# Patient Record
Sex: FEMALE | Race: WHITE | NOT HISPANIC OR LATINO | Employment: OTHER | URBAN - METROPOLITAN AREA
[De-identification: names, ages, dates, MRNs, and addresses within clinical notes are randomized per-mention and may not be internally consistent; named-entity substitution may affect disease eponyms.]

---

## 2017-01-09 ENCOUNTER — HOSPITAL ENCOUNTER (EMERGENCY)
Facility: HOSPITAL | Age: 32
Discharge: HOME | End: 2017-01-09
Attending: EMERGENCY MEDICINE | Admitting: EMERGENCY MEDICINE
Payer: MEDICARE

## 2017-01-09 ENCOUNTER — APPOINTMENT (EMERGENCY)
Dept: RADIOLOGY | Facility: HOSPITAL | Age: 32
End: 2017-01-09
Payer: MEDICARE

## 2017-01-09 VITALS
WEIGHT: 215 LBS | RESPIRATION RATE: 16 BRPM | HEART RATE: 71 BPM | TEMPERATURE: 98.3 F | SYSTOLIC BLOOD PRESSURE: 158 MMHG | BODY MASS INDEX: 33.67 KG/M2 | DIASTOLIC BLOOD PRESSURE: 85 MMHG | OXYGEN SATURATION: 98 %

## 2017-01-09 DIAGNOSIS — J20.9 BRONCHITIS, ACUTE: ICD-10-CM

## 2017-01-09 DIAGNOSIS — H60.92 OTITIS EXTERNA OF LEFT EAR: Primary | ICD-10-CM

## 2017-01-09 LAB
ANION GAP SERPL CALCULATED.3IONS-SCNC: 13 MMOL/L (ref 4–13)
BASOPHILS # BLD AUTO: 0 THOUSANDS/ΜL (ref 0–0.1)
BASOPHILS NFR BLD AUTO: 0 % (ref 0–1)
BUN SERPL-MCNC: 20 MG/DL (ref 5–25)
CALCIUM SERPL-MCNC: 8.9 MG/DL (ref 8.3–10.1)
CHLORIDE SERPL-SCNC: 105 MMOL/L (ref 100–108)
CO2 SERPL-SCNC: 22 MMOL/L (ref 21–32)
CREAT SERPL-MCNC: 0.81 MG/DL (ref 0.6–1.3)
EOSINOPHIL # BLD AUTO: 0.1 THOUSAND/ΜL (ref 0–0.61)
EOSINOPHIL NFR BLD AUTO: 1 % (ref 0–6)
ERYTHROCYTE [DISTWIDTH] IN BLOOD BY AUTOMATED COUNT: 14.3 % (ref 11.6–15.1)
GFR SERPL CREATININE-BSD FRML MDRD: >60 ML/MIN/1.73SQ M
GLUCOSE SERPL-MCNC: 95 MG/DL (ref 65–140)
HCT VFR BLD AUTO: 47.7 % (ref 37–47)
HGB BLD-MCNC: 15.7 G/DL (ref 12–16)
LYMPHOCYTES # BLD AUTO: 2.1 THOUSANDS/ΜL (ref 0.6–4.47)
LYMPHOCYTES NFR BLD AUTO: 20 % (ref 14–44)
MCH RBC QN AUTO: 31.1 PG (ref 27–31)
MCHC RBC AUTO-ENTMCNC: 33 G/DL (ref 31.4–37.4)
MCV RBC AUTO: 94 FL (ref 82–98)
MONOCYTES # BLD AUTO: 0.6 THOUSAND/ΜL (ref 0.17–1.22)
MONOCYTES NFR BLD AUTO: 6 % (ref 4–12)
NEUTROPHILS # BLD AUTO: 7.9 THOUSANDS/ΜL (ref 1.85–7.62)
NEUTS SEG NFR BLD AUTO: 73 % (ref 43–75)
NRBC BLD AUTO-RTO: 0 /100 WBCS
PLATELET # BLD AUTO: 271 THOUSANDS/UL (ref 130–400)
PMV BLD AUTO: 8.2 FL (ref 8.9–12.7)
POTASSIUM SERPL-SCNC: 4.3 MMOL/L (ref 3.5–5.3)
RBC # BLD AUTO: 5.06 MILLION/UL (ref 4.2–5.4)
SODIUM SERPL-SCNC: 140 MMOL/L (ref 136–145)
WBC # BLD AUTO: 10.7 THOUSAND/UL (ref 4.8–10.8)

## 2017-01-09 PROCEDURE — 85025 COMPLETE CBC W/AUTO DIFF WBC: CPT | Performed by: EMERGENCY MEDICINE

## 2017-01-09 PROCEDURE — 36415 COLL VENOUS BLD VENIPUNCTURE: CPT | Performed by: EMERGENCY MEDICINE

## 2017-01-09 PROCEDURE — 94640 AIRWAY INHALATION TREATMENT: CPT

## 2017-01-09 PROCEDURE — 99284 EMERGENCY DEPT VISIT MOD MDM: CPT

## 2017-01-09 PROCEDURE — 71010 HB CHEST X-RAY 1 VIEW FRONTAL (PORTABLE): CPT

## 2017-01-09 PROCEDURE — 96375 TX/PRO/DX INJ NEW DRUG ADDON: CPT

## 2017-01-09 PROCEDURE — 80048 BASIC METABOLIC PNL TOTAL CA: CPT | Performed by: EMERGENCY MEDICINE

## 2017-01-09 PROCEDURE — 96365 THER/PROPH/DIAG IV INF INIT: CPT

## 2017-01-09 RX ORDER — ALBUTEROL SULFATE 90 UG/1
1-2 AEROSOL, METERED RESPIRATORY (INHALATION) EVERY 6 HOURS PRN
Qty: 1 INHALER | Refills: 0 | Status: SHIPPED | OUTPATIENT
Start: 2017-01-09 | End: 2017-02-08

## 2017-01-09 RX ORDER — NEOMYCIN SULFATE, POLYMYXIN B SULFATE AND HYDROCORTISONE 10; 3.5; 1 MG/ML; MG/ML; [USP'U]/ML
3 SUSPENSION/ DROPS AURICULAR (OTIC) 3 TIMES DAILY
Qty: 10 ML | Refills: 0 | Status: SHIPPED | OUTPATIENT
Start: 2017-01-09 | End: 2017-09-17 | Stop reason: ALTCHOICE

## 2017-01-09 RX ORDER — PREDNISONE 20 MG/1
40 TABLET ORAL DAILY
Qty: 8 TABLET | Refills: 0 | Status: SHIPPED | OUTPATIENT
Start: 2017-01-09 | End: 2017-01-13

## 2017-01-09 RX ORDER — IPRATROPIUM BROMIDE AND ALBUTEROL SULFATE 2.5; .5 MG/3ML; MG/3ML
3 SOLUTION RESPIRATORY (INHALATION) ONCE
Status: COMPLETED | OUTPATIENT
Start: 2017-01-09 | End: 2017-01-09

## 2017-01-09 RX ORDER — OXYCODONE HYDROCHLORIDE AND ACETAMINOPHEN 5; 325 MG/1; MG/1
1 TABLET ORAL ONCE
Status: COMPLETED | OUTPATIENT
Start: 2017-01-09 | End: 2017-01-09

## 2017-01-09 RX ORDER — AZITHROMYCIN 250 MG/1
250 TABLET, FILM COATED ORAL DAILY
Qty: 4 TABLET | Refills: 0 | Status: SHIPPED | OUTPATIENT
Start: 2017-01-09 | End: 2017-01-13

## 2017-01-09 RX ORDER — METHYLPREDNISOLONE SODIUM SUCCINATE 125 MG/2ML
125 INJECTION, POWDER, LYOPHILIZED, FOR SOLUTION INTRAMUSCULAR; INTRAVENOUS ONCE
Status: COMPLETED | OUTPATIENT
Start: 2017-01-09 | End: 2017-01-09

## 2017-01-09 RX ADMIN — IPRATROPIUM BROMIDE AND ALBUTEROL SULFATE 3 ML: .5; 3 SOLUTION RESPIRATORY (INHALATION) at 14:34

## 2017-01-09 RX ADMIN — AZITHROMYCIN 500 MG: 500 INJECTION, POWDER, LYOPHILIZED, FOR SOLUTION INTRAVENOUS at 14:49

## 2017-01-09 RX ADMIN — SODIUM CHLORIDE 1000 ML: 0.9 INJECTION, SOLUTION INTRAVENOUS at 14:45

## 2017-01-09 RX ADMIN — METHYLPREDNISOLONE SODIUM SUCCINATE 125 MG: 125 INJECTION, POWDER, FOR SOLUTION INTRAMUSCULAR; INTRAVENOUS at 14:45

## 2017-01-09 RX ADMIN — OXYCODONE HYDROCHLORIDE AND ACETAMINOPHEN 1 TABLET: 5; 325 TABLET ORAL at 14:44

## 2017-01-27 ENCOUNTER — GENERIC CONVERSION - ENCOUNTER (OUTPATIENT)
Dept: OTHER | Facility: OTHER | Age: 32
End: 2017-01-27

## 2017-02-02 ENCOUNTER — GENERIC CONVERSION - ENCOUNTER (OUTPATIENT)
Dept: OTHER | Facility: OTHER | Age: 32
End: 2017-02-02

## 2017-02-24 ENCOUNTER — GENERIC CONVERSION - ENCOUNTER (OUTPATIENT)
Dept: OTHER | Facility: OTHER | Age: 32
End: 2017-02-24

## 2017-05-31 ENCOUNTER — HOSPITAL ENCOUNTER (EMERGENCY)
Facility: HOSPITAL | Age: 32
Discharge: HOME | End: 2017-05-31
Attending: EMERGENCY MEDICINE | Admitting: EMERGENCY MEDICINE
Payer: MEDICARE

## 2017-05-31 VITALS
SYSTOLIC BLOOD PRESSURE: 140 MMHG | DIASTOLIC BLOOD PRESSURE: 77 MMHG | RESPIRATION RATE: 28 BRPM | HEART RATE: 98 BPM | TEMPERATURE: 101.7 F | OXYGEN SATURATION: 98 %

## 2017-05-31 DIAGNOSIS — J02.9 PHARYNGITIS, UNSPECIFIED ETIOLOGY: ICD-10-CM

## 2017-05-31 DIAGNOSIS — R50.9 FEVER: Primary | ICD-10-CM

## 2017-05-31 PROCEDURE — 99282 EMERGENCY DEPT VISIT SF MDM: CPT

## 2017-05-31 RX ORDER — ACETAMINOPHEN 325 MG/1
650 TABLET ORAL ONCE
Status: COMPLETED | OUTPATIENT
Start: 2017-05-31 | End: 2017-05-31

## 2017-05-31 RX ORDER — AMOXICILLIN AND CLAVULANATE POTASSIUM 875; 125 MG/1; MG/1
1 TABLET, FILM COATED ORAL ONCE
Status: COMPLETED | OUTPATIENT
Start: 2017-05-31 | End: 2017-05-31

## 2017-05-31 RX ORDER — AMOXICILLIN AND CLAVULANATE POTASSIUM 875; 125 MG/1; MG/1
1 TABLET, FILM COATED ORAL 2 TIMES DAILY
Qty: 20 TABLET | Refills: 0 | Status: SHIPPED | OUTPATIENT
Start: 2017-05-31 | End: 2017-06-10

## 2017-05-31 RX ADMIN — ACETAMINOPHEN 650 MG: 325 TABLET, FILM COATED ORAL at 22:03

## 2017-05-31 RX ADMIN — AMOXICILLIN AND CLAVULANATE POTASSIUM 1 TABLET: 875; 125 TABLET, FILM COATED ORAL at 22:03

## 2017-09-17 ENCOUNTER — APPOINTMENT (EMERGENCY)
Dept: RADIOLOGY | Facility: HOSPITAL | Age: 32
End: 2017-09-17
Payer: MEDICARE

## 2017-09-17 ENCOUNTER — HOSPITAL ENCOUNTER (EMERGENCY)
Facility: HOSPITAL | Age: 32
Discharge: HOME | End: 2017-09-17
Admitting: EMERGENCY MEDICINE
Payer: MEDICARE

## 2017-09-17 VITALS
WEIGHT: 200 LBS | OXYGEN SATURATION: 95 % | HEART RATE: 74 BPM | BODY MASS INDEX: 31.32 KG/M2 | TEMPERATURE: 98.1 F | SYSTOLIC BLOOD PRESSURE: 143 MMHG | RESPIRATION RATE: 18 BRPM | DIASTOLIC BLOOD PRESSURE: 66 MMHG

## 2017-09-17 DIAGNOSIS — S93.401A: Primary | ICD-10-CM

## 2017-09-17 PROCEDURE — 73610 X-RAY EXAM OF ANKLE: CPT

## 2017-09-17 PROCEDURE — 99283 EMERGENCY DEPT VISIT LOW MDM: CPT

## 2017-09-17 PROCEDURE — 73630 X-RAY EXAM OF FOOT: CPT

## 2017-09-17 RX ORDER — NAPROXEN 500 MG/1
500 TABLET ORAL 2 TIMES DAILY WITH MEALS
Qty: 20 TABLET | Refills: 0 | Status: SHIPPED | OUTPATIENT
Start: 2017-09-17 | End: 2018-03-26 | Stop reason: ALTCHOICE

## 2017-09-17 RX ORDER — HYDROCODONE BITARTRATE AND ACETAMINOPHEN 5; 325 MG/1; MG/1
1 TABLET ORAL ONCE
Status: COMPLETED | OUTPATIENT
Start: 2017-09-17 | End: 2017-09-17

## 2017-09-17 RX ADMIN — HYDROCODONE BITARTRATE AND ACETAMINOPHEN 1 TABLET: 5; 325 TABLET ORAL at 10:39
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
## 2018-03-12 ENCOUNTER — APPOINTMENT (EMERGENCY)
Dept: RADIOLOGY | Facility: HOSPITAL | Age: 33
End: 2018-03-12
Payer: MEDICARE

## 2018-03-12 ENCOUNTER — HOSPITAL ENCOUNTER (EMERGENCY)
Facility: HOSPITAL | Age: 33
Discharge: HOME | End: 2018-03-12
Payer: MEDICARE

## 2018-03-12 VITALS
BODY MASS INDEX: 31.32 KG/M2 | TEMPERATURE: 99.5 F | HEART RATE: 75 BPM | OXYGEN SATURATION: 97 % | RESPIRATION RATE: 16 BRPM | DIASTOLIC BLOOD PRESSURE: 77 MMHG | SYSTOLIC BLOOD PRESSURE: 148 MMHG | WEIGHT: 200 LBS

## 2018-03-12 DIAGNOSIS — M62.838 MUSCLE SPASM: Primary | ICD-10-CM

## 2018-03-12 DIAGNOSIS — L25.9 CONTACT DERMATITIS: ICD-10-CM

## 2018-03-12 DIAGNOSIS — M54.16 LUMBAR RADICULOPATHY: ICD-10-CM

## 2018-03-12 LAB
ANION GAP SERPL CALCULATED.3IONS-SCNC: 8 MMOL/L (ref 4–13)
BASOPHILS # BLD AUTO: 0 THOUSANDS/ΜL (ref 0–0.1)
BASOPHILS NFR BLD AUTO: 0 % (ref 0–1)
BILIRUB UR QL STRIP: NEGATIVE
BUN SERPL-MCNC: 13 MG/DL (ref 5–25)
CALCIUM SERPL-MCNC: 9 MG/DL (ref 8.3–10.1)
CHLORIDE SERPL-SCNC: 104 MMOL/L (ref 100–108)
CLARITY UR: CLEAR
CO2 SERPL-SCNC: 29 MMOL/L (ref 21–32)
COLOR UR: YELLOW
CREAT SERPL-MCNC: 0.89 MG/DL (ref 0.6–1.3)
EOSINOPHIL # BLD AUTO: 0.3 THOUSAND/ΜL (ref 0–0.61)
EOSINOPHIL NFR BLD AUTO: 3 % (ref 0–6)
ERYTHROCYTE [DISTWIDTH] IN BLOOD BY AUTOMATED COUNT: 14.4 % (ref 11.6–15.1)
EXT PREG TEST URINE: NEGATIVE
GFR SERPL CREATININE-BSD FRML MDRD: 85 ML/MIN/1.73SQ M
GLUCOSE SERPL-MCNC: 92 MG/DL (ref 65–140)
GLUCOSE UR STRIP-MCNC: NEGATIVE MG/DL
HCT VFR BLD AUTO: 44.5 % (ref 37–47)
HGB BLD-MCNC: 14.7 G/DL (ref 12–16)
HGB UR QL STRIP.AUTO: NEGATIVE
KETONES UR STRIP-MCNC: NEGATIVE MG/DL
LEUKOCYTE ESTERASE UR QL STRIP: NEGATIVE
LYMPHOCYTES # BLD AUTO: 2.5 THOUSANDS/ΜL (ref 0.6–4.47)
LYMPHOCYTES NFR BLD AUTO: 25 % (ref 14–44)
MCH RBC QN AUTO: 31.8 PG (ref 27–31)
MCHC RBC AUTO-ENTMCNC: 33.1 G/DL (ref 31.4–37.4)
MCV RBC AUTO: 96 FL (ref 82–98)
MONOCYTES # BLD AUTO: 0.8 THOUSAND/ΜL (ref 0.17–1.22)
MONOCYTES NFR BLD AUTO: 8 % (ref 4–12)
NEUTROPHILS # BLD AUTO: 6.5 THOUSANDS/ΜL (ref 1.85–7.62)
NEUTS SEG NFR BLD AUTO: 64 % (ref 43–75)
NITRITE UR QL STRIP: NEGATIVE
NRBC BLD AUTO-RTO: 0 /100 WBCS
PH UR STRIP.AUTO: 7.5 [PH] (ref 5–9)
PLATELET # BLD AUTO: 259 THOUSANDS/UL (ref 130–400)
PLATELET BLD QL SMEAR: ADEQUATE
PMV BLD AUTO: 8.4 FL (ref 8.9–12.7)
POTASSIUM SERPL-SCNC: 4.3 MMOL/L (ref 3.5–5.3)
PROT UR STRIP-MCNC: NEGATIVE MG/DL
RBC # BLD AUTO: 4.63 MILLION/UL (ref 4.2–5.4)
SODIUM SERPL-SCNC: 141 MMOL/L (ref 136–145)
SP GR UR STRIP.AUTO: 1.02 (ref 1–1.03)
UROBILINOGEN UR QL STRIP.AUTO: 0.2 E.U./DL
WBC # BLD AUTO: 10.2 THOUSAND/UL (ref 4.8–10.8)

## 2018-03-12 PROCEDURE — 80048 BASIC METABOLIC PNL TOTAL CA: CPT | Performed by: PHYSICIAN ASSISTANT

## 2018-03-12 PROCEDURE — 81003 URINALYSIS AUTO W/O SCOPE: CPT | Performed by: PHYSICIAN ASSISTANT

## 2018-03-12 PROCEDURE — 96361 HYDRATE IV INFUSION ADD-ON: CPT

## 2018-03-12 PROCEDURE — 99284 EMERGENCY DEPT VISIT MOD MDM: CPT

## 2018-03-12 PROCEDURE — 96374 THER/PROPH/DIAG INJ IV PUSH: CPT

## 2018-03-12 PROCEDURE — 74176 CT ABD & PELVIS W/O CONTRAST: CPT

## 2018-03-12 PROCEDURE — 36415 COLL VENOUS BLD VENIPUNCTURE: CPT | Performed by: PHYSICIAN ASSISTANT

## 2018-03-12 PROCEDURE — 96375 TX/PRO/DX INJ NEW DRUG ADDON: CPT

## 2018-03-12 PROCEDURE — 85025 COMPLETE CBC W/AUTO DIFF WBC: CPT | Performed by: PHYSICIAN ASSISTANT

## 2018-03-12 PROCEDURE — 81025 URINE PREGNANCY TEST: CPT | Performed by: PHYSICIAN ASSISTANT

## 2018-03-12 RX ORDER — KETOROLAC TROMETHAMINE 30 MG/ML
30 INJECTION, SOLUTION INTRAMUSCULAR; INTRAVENOUS ONCE
Status: COMPLETED | OUTPATIENT
Start: 2018-03-12 | End: 2018-03-12

## 2018-03-12 RX ORDER — ONDANSETRON 2 MG/ML
4 INJECTION INTRAMUSCULAR; INTRAVENOUS ONCE
Status: COMPLETED | OUTPATIENT
Start: 2018-03-12 | End: 2018-03-12

## 2018-03-12 RX ORDER — NAPROXEN 500 MG/1
500 TABLET ORAL 2 TIMES DAILY WITH MEALS
Qty: 20 TABLET | Refills: 0 | Status: SHIPPED | OUTPATIENT
Start: 2018-03-12 | End: 2018-03-26 | Stop reason: ALTCHOICE

## 2018-03-12 RX ORDER — PREDNISONE 20 MG/1
40 TABLET ORAL DAILY
Qty: 10 TABLET | Refills: 0 | Status: SHIPPED | OUTPATIENT
Start: 2018-03-12 | End: 2018-03-17

## 2018-03-12 RX ORDER — CYCLOBENZAPRINE HCL 10 MG
10 TABLET ORAL 3 TIMES DAILY PRN
Qty: 12 TABLET | Refills: 0 | Status: SHIPPED | OUTPATIENT
Start: 2018-03-12 | End: 2018-03-26 | Stop reason: ALTCHOICE

## 2018-03-12 RX ADMIN — KETOROLAC TROMETHAMINE 30 MG: 30 INJECTION, SOLUTION INTRAMUSCULAR at 18:24

## 2018-03-12 RX ADMIN — SODIUM CHLORIDE 1000 ML: 0.9 INJECTION, SOLUTION INTRAVENOUS at 18:23

## 2018-03-12 RX ADMIN — ONDANSETRON 4 MG: 2 INJECTION INTRAMUSCULAR; INTRAVENOUS at 18:23
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
## 2018-03-12 NOTE — DISCHARGE INSTRUCTIONS
Contact Dermatitis   WHAT YOU NEED TO KNOW:   Contact dermatitis is a skin rash  It develops when you touch something that irritates your skin or causes an allergic reaction  DISCHARGE INSTRUCTIONS:   Call 911 for any of the following:   · You have sudden trouble breathing  · Your throat swells and you have trouble eating  · Your face is swollen  Contact your healthcare provider if:   · You have a fever  · Your blisters are draining pus  · Your rash spreads or does not get better, even after treatment  · You have questions or concerns about your condition or care  Medicines:   · Medicines  help decrease itching and swelling  They will be given as a topical medicine to apply to your rash or as a pill  · Take your medicine as directed  Contact your healthcare provider if you think your medicine is not helping or if you have side effects  Tell him or her if you are allergic to any medicine  Keep a list of the medicines, vitamins, and herbs you take  Include the amounts, and when and why you take them  Bring the list or the pill bottles to follow-up visits  Carry your medicine list with you in case of an emergency  Manage contact dermatitis:   · Take short baths or showers in cool water  Use mild soap or soap-free cleansers  Add oatmeal, baking soda, or cornstarch to the bath water to help decrease skin irritation  · Avoid skin irritants , such as makeup, hair products, soaps, and cleansers  Use products that do not contain perfume or dye  · Apply a cool compress to your rash  This will help soothe your skin  · Keep your skin moist   Rub unscented cream or lotion on your skin to prevent dryness and itching  Do this right after a bath or shower when your skin is still damp  Follow up with your healthcare provider or dermatologist in 2 to 3 days:  Write down your questions so you remember to ask them during your visits     © 2017 [employer] Information is for End User's use only and may not be sold, redistributed or otherwise used for commercial purposes  All illustrations and images included in CareNotes® are the copyrighted property of [employer]  or [employer]ol[employer]  The above information is an  only  It is not intended as medical advice for individual conditions or treatments  Talk to your doctor, nurse or pharmacist before following any medical regimen to see if it is safe and effective for you  Lumbar Radiculopathy   WHAT YOU NEED TO KNOW:   What is lumbar radiculopathy? Lumbar radiculopathy is a painful condition that happens when a nerve in your lumbar spine (lower back) is pinched or irritated  Nerves control feeling and movement in your body  What causes lumbar radiculopathy? You may get a pinched nerve in your lumbar spine if you have disc damage  Discs are natural, spongy cushions between your vertebrae (back bones) that allow your spine to move  Your discs may move out of place and pinch the nerve in your spine  Your risk for a pinched nerve and lumbar radiculopathy increases if:  · You smoke  · You have diabetes, a spinal infection, or a growth in your spine  · You are overweight  · You are male  · You are elderly  What are the signs and symptoms of lumbar radiculopathy? You may have any of the following:  · Pain that moves from your lower back to your buttocks, groin, and the back of your leg  The pain is often felt below your knee  Your pain may worsen when you cough, sneeze, stand, or sit  · Numbness, weakness, or tingling in your back or legs  How is lumbar radiculopathy diagnosed? Your healthcare provider will examine you and ask about your family history of back and leg pain  He may also test you for weakness, numbness, or tingling in your back, buttocks, and legs  Your healthcare provider may ask you to lie on your back and lift your leg to locate your pain   You may have any of the following:  · Blood tests: You may need blood taken to give caregivers information about how your body is working  The blood may be taken from your hand, arm, or IV  · Magnetic resonance imaging (MRI): An MRI machine is used to take a picture of your lower back  Your healthcare provider will use this picture to check for problems and changes in your back bones, nerves, and discs  You will need to lie still during this test  The MRI machine contains a very powerful magnet  Never enter the MRI room with any metal objects  This can cause serious injury  Tell your healthcare provider if you have any metal implants in your body  · X-ray: An x-ray is a picture of your lower back  A lumbar x-ray may show signs of infection or other problems with your spine  · An electromyography (EMG)  test measures the electrical activity of your muscles at rest and with movement  · Computed tomography (CT) scan: A special x-ray machine uses a computer to take pictures of your lower back  It may be used to look at your bones, discs, and nerves  You may be given dye in your IV to help improve the pictures  Tell your healthcare provider if you are allergic to shellfish, or have other allergies or medical conditions  People who are allergic to iodine or shellfish (lobster, crab, or shrimp) may be allergic to some dyes  How is lumbar radiculopathy treated? Treatment of lumbar radiculopathy may reduce pain and swelling, and improve your ability to walk and do your normal activities  Ask your healthcare provider for more information about these and other treatments for lumbar radiculopathy:  · Medicines:     ¨ NSAIDs , such as ibuprofen, help decrease swelling, pain, and fever  This medicine is available with or without a doctor's order  NSAIDs can cause stomach bleeding or kidney problems in certain people  If you take blood thinner medicine, always ask your healthcare provider if NSAIDs are safe for you   Always read the medicine label and follow directions  ¨ Muscle relaxers  help decrease pain and muscle spasms  ¨ Opioids: This is a strong medicine given to reduce severe pain  It is also called narcotic pain medicine  Take this medicine exactly as directed by your healthcare provider  ¨ Oral steroids: Steroids may be given to reduce swelling and pain  ¨ Steroid injections: Healthcare providers may give you steroid medicine through a needle (shot) into your lumbar spine  This may help decrease your nerve pain and swelling  You may need more than 1 injection if your symptoms do not improve after the first treatment  · Physical therapy: Your healthcare provider may suggest physical therapy  Your physical therapist may teach you certain exercises to improve posture (the way you stand and sit), flexibility, and strength in your lower back  He may also teach you how to remain safely active and avoid further injury  Follow the exercise plan given to you by your physical therapist     · Transcutaneous electrical nerve stimulation: This treatment, called TENS, stimulates your nerves and may decrease your pain  Wires are attached to pads  The pads are attached to your skin  The wires send a mild current through your nerves  · Surgery: You may need surgery to relieve your pinched nerve if your condition has not improved within 4 to 6 weeks  You may also need it if you have lumbar radiculopathy more than once  What are the risks of treatment for lumbar radiculopathy? · Without treatment, your pain may worsen  The pinched and swollen nerve may lead to problems when you walk or move  In severe cases, you may lose control of your urine or bowel movements  Bedrest can make your symptoms worse  · Pain medicines can cause vomiting, upset stomach, constipation (large, hard bowel movements that are difficult to pass), or kidney or liver problems  Opioid medicine may be addictive (hard to quit taking once you start)   Oral steroids and steroid injections may have side effects, such as facial redness, fluid retention (water weight gain), and mood changes  Steroid injections may be painful and can cause severe headaches, infections, allergic reactions, or nerve damage  Surgery risks include delayed problems with healing, spinal or bladder infection, damage to the spinal cord or other nerves, and ongoing pain  In rare cases, you could become paralyzed (not able to move your arms or legs)  How can I care for myself when I have lumbar radiculopathy? · Stay active: It is best to be active when you have lumbar radiculopathy  Your healthcare provider may tell you to take walks to ease yourself back into your daily routine  Avoid long periods of bed rest  Bed rest could worsen your symptoms  Do not move in ways that increase your pain  Ask your healthcare provider for more information about the best ways to stay active  · Use ice or heat packs:  Use ice or heat packs on the sore area of your body to decrease the pain and swelling  Put ice in a plastic bag covered with a towel on your low back  Cover heated items with a towel to avoid burns  Use ice and heat as directed  · Avoid heavy lifting: Your condition may worsen if you lift heavy things  Avoid lifting if possible  · Maintain a healthy weight:  Excess body weight may strain your back  Talk with your healthcare provider about ways to lose excess weight if you are overweight  When should I contact my healthcare provider? · Your pain does not improve within 1 to 3 weeks after treatment  · Your pain and weakness keep you from your normal activities at work, home, or school  · You lose more than 10 pounds in 6 months without trying  · You become depressed or sad because of the pain  · You have questions or concerns about your condition or care  When should I seek immediate care or call 911? · You have a fever greater than 100 4°F for longer than 2 days      · You have new, severe back or leg pain, or your pain spreads to both legs  · You have any new signs of numbness or weakness, especially in your lower back, legs, arms, or genital area  · You have new trouble controlling your urine and bowel movements  · You do not feel like your bladder empties when you urinate  CARE AGREEMENT:   You have the right to help plan your care  Learn about your health condition and how it may be treated  Discuss treatment options with your caregivers to decide what care you want to receive  You always have the right to refuse treatment  The above information is an  only  It is not intended as medical advice for individual conditions or treatments  Talk to your doctor, nurse or pharmacist before following any medical regimen to see if it is safe and effective for you  © 2017 [address] Information is for End User's use only and may not be sold, redistributed or otherwise used for commercial purposes  All illustrations and images included in CareNotes® are the copyrighted property of [employer]  or [employer]ol[employer]

## 2018-03-26 ENCOUNTER — OFFICE VISIT (OUTPATIENT)
Dept: FAMILY MEDICINE CLINIC | Facility: CLINIC | Age: 33
End: 2018-03-26
Payer: MEDICARE

## 2018-03-26 VITALS
RESPIRATION RATE: 18 BRPM | OXYGEN SATURATION: 97 % | HEIGHT: 67 IN | BODY MASS INDEX: 34.84 KG/M2 | WEIGHT: 222 LBS | DIASTOLIC BLOOD PRESSURE: 80 MMHG | HEART RATE: 84 BPM | TEMPERATURE: 99.6 F | SYSTOLIC BLOOD PRESSURE: 120 MMHG

## 2018-03-26 DIAGNOSIS — F41.9 ANXIETY AND DEPRESSION: ICD-10-CM

## 2018-03-26 DIAGNOSIS — F32.A ANXIETY AND DEPRESSION: ICD-10-CM

## 2018-03-26 DIAGNOSIS — Z71.6 TOBACCO ABUSE COUNSELING: ICD-10-CM

## 2018-03-26 DIAGNOSIS — Z71.3 DIETARY COUNSELING: ICD-10-CM

## 2018-03-26 DIAGNOSIS — R21 RASH: Primary | ICD-10-CM

## 2018-03-26 PROCEDURE — 99213 OFFICE O/P EST LOW 20 MIN: CPT | Performed by: FAMILY MEDICINE

## 2018-03-26 RX ORDER — ALBUTEROL SULFATE 90 UG/1
2 AEROSOL, METERED RESPIRATORY (INHALATION) EVERY 6 HOURS PRN
COMMUNITY
Start: 2008-01-31 | End: 2019-10-21

## 2018-03-26 RX ORDER — PREDNISONE 10 MG/1
10 TABLET ORAL DAILY
Qty: 30 TABLET | Refills: 0 | Status: SHIPPED | OUTPATIENT
Start: 2018-03-26 | End: 2018-04-03 | Stop reason: ALTCHOICE

## 2018-03-26 RX ORDER — HYDROXYZINE HYDROCHLORIDE 25 MG/1
25 TABLET, FILM COATED ORAL EVERY 8 HOURS PRN
Qty: 21 TABLET | Refills: 0 | Status: SHIPPED | OUTPATIENT
Start: 2018-03-26 | End: 2018-05-09

## 2018-03-26 NOTE — PROGRESS NOTES
Assessment/Plan:    No problem-specific Assessment & Plan notes found for this encounter  Diagnoses and all orders for this visit:    Rash  Persistent erythematous, blanchable, papular rash with pruritis x3 months with no known new solvents/foods/pets  Rash predominantly on trunk and back, with noted rash on all extremities and some on face, palms and soles are spared  Pt was on a prednisone burst a week ago which provided some relief, however on completion of steroids rash reappeared  Will treat with a longer prednisone taper with hydroxyzine for pruritis prn  Advised if pt takes hydroxyzine she should not take benadryl  Pt will return in 2 weeks for follow up  All questions were answered, she agrees with this plan  -     predniSONE 10 mg tablet; Take 1 tablet (10 mg total) by mouth daily Take 4 tab x3 days, then 3 tabx3 days, then 2 [id number], then 1 daily, then stop  -     hydrOXYzine HCL (ATARAX) 25 mg tablet; Take 1 tablet (25 mg total) by mouth every 8 (eight) hours as needed for itching    BMI 34 0-34 9,adult/Dietary counseling  Advised well balanced diet with regular exercise regimen up to 30min most days of the week to promote weight loss  Tobacco abuse counseling  Counseled on smoking cessation, pt declined help at this time  Anxiety and depression  [facility]  Advised pt if there is an emergent situation she should call 911 or go to ER  She denies SI/HI  Subjective:      Patient ID: Jayesh Mo is a 34 y o  female  33 yo female at office for pruritic rash over entire body which had started 3 months ago  She notes she went to the ER a week ago for sciatica pains, and her rash was noted by the doctor and she  was prescribed prednisone burst which provided relief  She states when she finished the prednisone course the rash came back and was seemed to be more pruritic   She has been taking benadryl 2tab twice a day for a month for the persistent pruritis  She denies new solvents such as laundry detergent, moisturizers, soaps, shampoos, clothing, foods, pets  She states she lives with her 2 daughters at home, and no one else has similar rashes  She denies recent medications/vitamins/herbal supplements  She denies recent travel and being outside/hiking  The following portions of the patient's history were reviewed and updated as appropriate: allergies, current medications, past family history, past medical history, past social history, past surgical history and problem list     Review of Systems   Constitutional: Negative for activity change, appetite change, chills, diaphoresis, fatigue and fever  HENT: Positive for congestion and postnasal drip  Negative for ear pain and trouble swallowing  Eyes: Negative for itching  Respiratory: Negative for shortness of breath and wheezing  Cardiovascular: Negative for chest pain and palpitations  Gastrointestinal: Negative for abdominal distention, abdominal pain, constipation, diarrhea, nausea and vomiting  Musculoskeletal: Negative for arthralgias  Skin: Positive for rash  Negative for wound  Neurological: Negative for dizziness, light-headedness and headaches  Hematological: Negative for adenopathy  Psychiatric/Behavioral: Positive for sleep disturbance  Objective:      /80   Pulse 84   Temp 99 6 °F (37 6 °C)   Resp 18   Ht 5' 7" (1 702 m)   Wt 101 kg (222 lb)   LMP 02/27/2018   SpO2 97%   BMI 34 77 kg/m²          Physical Exam   Constitutional: She is oriented to person, place, and time  She appears well-developed and well-nourished  No distress  HENT:   Head: Normocephalic and atraumatic  Mouth/Throat: Oropharynx is clear and moist    Eyes: Pupils are equal, round, and reactive to light  Neck: Normal range of motion  Neck supple  Cardiovascular: Normal rate, regular rhythm, normal heart sounds and intact distal pulses      Pulmonary/Chest: Effort normal and breath sounds normal  No respiratory distress  Abdominal: Soft  Bowel sounds are normal  She exhibits no distension  There is no tenderness  Musculoskeletal: Normal range of motion  She exhibits no edema  Lymphadenopathy:     She has no cervical adenopathy  Neurological: She is alert and oriented to person, place, and time  Skin: Skin is warm  Rash noted  She is not diaphoretic  Diffuse erythematous papular blanchable rash noted predominantly on trunk/back with noted areas of similar rash on all extremities and face  Excoriation noted on first webbed space on left hand  Excoriations noted  Palms and soles spared  Nursing note and vitals reviewed

## 2018-04-03 ENCOUNTER — OFFICE VISIT (OUTPATIENT)
Dept: FAMILY MEDICINE CLINIC | Facility: CLINIC | Age: 33
End: 2018-04-03
Payer: MEDICARE

## 2018-04-03 VITALS
HEIGHT: 67 IN | BODY MASS INDEX: 36.28 KG/M2 | DIASTOLIC BLOOD PRESSURE: 60 MMHG | WEIGHT: 231.13 LBS | RESPIRATION RATE: 18 BRPM | OXYGEN SATURATION: 98 % | SYSTOLIC BLOOD PRESSURE: 138 MMHG | HEART RATE: 74 BPM | TEMPERATURE: 99.9 F

## 2018-04-03 DIAGNOSIS — B86 SCABIES: Primary | ICD-10-CM

## 2018-04-03 PROCEDURE — 99213 OFFICE O/P EST LOW 20 MIN: CPT | Performed by: FAMILY MEDICINE

## 2018-04-03 RX ORDER — PERMETHRIN 50 MG/G
CREAM TOPICAL ONCE
Qty: 60 G | Refills: 1 | Status: SHIPPED | OUTPATIENT
Start: 2018-04-03 | End: 2018-04-03

## 2018-04-04 NOTE — PROGRESS NOTES
Assessment/Plan:    No problem-specific Assessment & Plan notes found for this encounter  Diagnoses and all orders for this visit:    Scabies  Persistent pruritis and with rash all over body despite 2 doses of steroids  Pt has not had new solvents, foods, clothes so less likely contact dermatitis  Will treat with permethrin and follow up as needed if sx persist or do not improve  Advised pt should deep clean home and sheets and towels after each treatment  -     permethrin (ELIMITE) 5 % cream; Apply topically once for 1 dose        Subjective:      Patient ID: Stephan Hollis is a 34 y o  female  35 yo female at office for follow up of rash with pruritis all over body which has been persistent for weeks now  She has completed 2 courses of steroids and symptoms are still persistent  Pt additionally notes now her daughter has similar symptoms  She denies fever, chills, easy bleeding or bruising, hematuria and melena  The following portions of the patient's history were reviewed and updated as appropriate: allergies, current medications, past family history, past medical history, past social history, past surgical history and problem list     Review of Systems   Constitutional: Negative for chills, fatigue and fever  Respiratory: Negative for shortness of breath  Cardiovascular: Negative for chest pain  Gastrointestinal: Negative for abdominal pain, blood in stool, diarrhea, nausea and vomiting  Genitourinary: Negative for hematuria  Skin: Positive for rash  Neurological: Negative for dizziness and headaches  Hematological: Does not bruise/bleed easily           Objective:      /60 (BP Location: Left arm, Patient Position: Sitting)   Pulse 74   Temp 99 9 °F (37 7 °C) (Tympanic)   Resp 18   Ht 5' 7" (1 702 m)   Wt 105 kg (231 lb 2 oz)   LMP 03/30/2018 (Exact Date)   SpO2 98%   BMI 36 20 kg/m²          Physical Exam   Constitutional: She appears well-developed and well-nourished  No distress  Cardiovascular: Normal rate, regular rhythm, normal heart sounds and intact distal pulses  Pulmonary/Chest: Effort normal    Abdominal: Soft  Musculoskeletal: She exhibits no edema  Neurological: She is alert  Skin: Rash noted  She is not diaphoretic  Excoriations noted diffusely all over body, predominantly on trunk  Multiple pinpoint scabbed marked noted   Nursing note and vitals reviewed

## 2018-04-18 ENCOUNTER — OFFICE VISIT (OUTPATIENT)
Dept: FAMILY MEDICINE CLINIC | Facility: CLINIC | Age: 33
End: 2018-04-18
Payer: MEDICARE

## 2018-04-18 VITALS
OXYGEN SATURATION: 99 % | SYSTOLIC BLOOD PRESSURE: 128 MMHG | BODY MASS INDEX: 35.94 KG/M2 | WEIGHT: 229 LBS | HEART RATE: 85 BPM | HEIGHT: 67 IN | RESPIRATION RATE: 18 BRPM | DIASTOLIC BLOOD PRESSURE: 64 MMHG

## 2018-04-18 DIAGNOSIS — B86 SCABIES: Primary | ICD-10-CM

## 2018-04-18 PROCEDURE — 99213 OFFICE O/P EST LOW 20 MIN: CPT | Performed by: FAMILY MEDICINE

## 2018-04-18 RX ORDER — IVERMECTIN 3 MG/1
200 TABLET ORAL ONCE
Qty: 13 TABLET | Refills: 0
Start: 2018-04-18 | End: 2018-04-18

## 2018-04-18 NOTE — PROGRESS NOTES
Assessment/Plan:    Will order ivermectin 7 tabs one dose for her   I will address children separately given instructions and f/u      Diagnoses and all orders for this visit:    Scabies  -     ivermectin (STROMECTOL) 3 MG TABS; Take 7 tablets (21,000 mcg total) by mouth once for 1 dose          Subjective:      Patient ID: Jyoti Amezquita is a 34 y o  female  Patient has been treated for scabies several times over past few weeks and have kept recurring  She has 2 children at home with sx  The cream is costing her over 80 dollars and she cant afford it   She had washed linins  Etc she denies meds allergies pregnancy        The following portions of the patient's history were reviewed and updated as appropriate: past family history, past medical history, past social history and past surgical history  Review of Systems   Skin:        See hpi   All other systems reviewed and are negative  Objective:      /64 (BP Location: Left arm, Patient Position: Sitting)   Pulse 85   Resp 18   Ht 5' 7" (1 702 m)   Wt 104 kg (229 lb)   LMP 03/30/2018 (Exact Date)   SpO2 99%   BMI 35 87 kg/m²          Physical Exam   Constitutional: She appears well-developed and well-nourished  HENT:   Head: Normocephalic  Eyes: Pupils are equal, round, and reactive to light     Skin:   Scattered macules and excoriated areas  Trunk and extremities

## 2018-04-19 PROBLEM — B86 SCABIES: Status: ACTIVE | Noted: 2018-04-19

## 2018-04-20 ENCOUNTER — HOSPITAL ENCOUNTER (EMERGENCY)
Facility: HOSPITAL | Age: 33
Discharge: HOME | End: 2018-04-20
Attending: EMERGENCY MEDICINE
Payer: MEDICARE

## 2018-04-20 VITALS
TEMPERATURE: 98.4 F | BODY MASS INDEX: 33.74 KG/M2 | RESPIRATION RATE: 18 BRPM | OXYGEN SATURATION: 100 % | DIASTOLIC BLOOD PRESSURE: 80 MMHG | HEART RATE: 70 BPM | WEIGHT: 215 LBS | SYSTOLIC BLOOD PRESSURE: 166 MMHG | HEIGHT: 67 IN

## 2018-04-20 DIAGNOSIS — B86 SCABIES: ICD-10-CM

## 2018-04-20 DIAGNOSIS — N39.0 UTI (URINARY TRACT INFECTION): Primary | ICD-10-CM

## 2018-04-20 LAB
BACTERIA UR QL AUTO: ABNORMAL /HPF
BILIRUB UR QL STRIP: NEGATIVE
CLARITY UR: ABNORMAL
COLOR UR: ABNORMAL
EXT PREG TEST URINE: NORMAL
GLUCOSE UR STRIP-MCNC: NEGATIVE MG/DL
HGB UR QL STRIP.AUTO: ABNORMAL
KETONES UR STRIP-MCNC: NEGATIVE MG/DL
LEUKOCYTE ESTERASE UR QL STRIP: ABNORMAL
NITRITE UR QL STRIP: NEGATIVE
NON-SQ EPI CELLS URNS QL MICRO: ABNORMAL /HPF
PH UR STRIP.AUTO: 6 [PH] (ref 5–9)
PROT UR STRIP-MCNC: NEGATIVE MG/DL
RBC #/AREA URNS AUTO: ABNORMAL /HPF
SP GR UR STRIP.AUTO: 1.02 (ref 1–1.03)
UROBILINOGEN UR QL STRIP.AUTO: 0.2 E.U./DL
WBC #/AREA URNS AUTO: ABNORMAL /HPF

## 2018-04-20 PROCEDURE — 81025 URINE PREGNANCY TEST: CPT | Performed by: EMERGENCY MEDICINE

## 2018-04-20 PROCEDURE — 81001 URINALYSIS AUTO W/SCOPE: CPT | Performed by: EMERGENCY MEDICINE

## 2018-04-20 PROCEDURE — 87591 N.GONORRHOEAE DNA AMP PROB: CPT | Performed by: EMERGENCY MEDICINE

## 2018-04-20 PROCEDURE — 99284 EMERGENCY DEPT VISIT MOD MDM: CPT

## 2018-04-20 PROCEDURE — 87491 CHLMYD TRACH DNA AMP PROBE: CPT | Performed by: EMERGENCY MEDICINE

## 2018-04-20 RX ORDER — SULFAMETHOXAZOLE AND TRIMETHOPRIM 800; 160 MG/1; MG/1
1 TABLET ORAL 2 TIMES DAILY
Qty: 14 TABLET | Refills: 0 | Status: SHIPPED | OUTPATIENT
Start: 2018-04-20 | End: 2018-04-27 | Stop reason: ALTCHOICE

## 2018-04-20 RX ORDER — PREDNISONE 20 MG/1
40 TABLET ORAL DAILY
Qty: 6 TABLET | Refills: 0 | Status: SHIPPED | OUTPATIENT
Start: 2018-04-20 | End: 2018-04-23

## 2018-04-20 NOTE — ED PROVIDER NOTES
History  Chief Complaint   Patient presents with    Vaginal Bleeding     Pt reports vaginal bleeding after having sex 4 days ago,  Pt reports feeling like she "tore" and has been bleeding since   Rash     Pt reports having a rash and was diagnosed with scabies  Pt reports being treated with creram with pt c/o worsening rash  Pt reports taking oral treatment after that  History provided by:  Patient  Rash   Location:  Full body  Quality: redness and scaling    Severity:  Moderate  Onset quality:  Gradual  Timing:  Constant  Progression:  Worsening  Chronicity:  Recurrent  Relieved by:  Nothing  Worsened by:  Nothing  Ineffective treatments:  None tried  Associated symptoms: no abdominal pain, no diarrhea, no fever, no headaches, no myalgias, no nausea, no shortness of breath, no sore throat and not wheezing    Associated symptoms comment:  Patient also complaining of scant vaginal bleeding  She was having sexual intercourse approximately a week ago she has a small laceration in that area she was seen by [facility] to evaluate her with a pelvic examination  The bleeding is present only when she wipes  Or urinates  She has a mild burning on urination  She has concern for possible sexually transmitted diseases as well  She denies any vaginal discharge  She denies any foul-smelling discharge  She says there i scant amount of bleeding  Nothing that significant less than a period  She has had irregular menstrual cycles on and off for the last several months as well  Prior to Admission Medications   Prescriptions Last Dose Informant Patient Reported? Taking? albuterol (2 5 mg/3 mL) 0 083 % nebulizer solution   Yes No   Sig: Take 2 5 mg by nebulization 2 (two) times a day     albuterol (PROVENTIL HFA,VENTOLIN HFA) 90 mcg/act inhaler   Yes No   Sig: Inhale   hydrOXYzine HCL (ATARAX) 25 mg tablet   No No   Sig: Take 1 tablet (25 mg total) by mouth every 8 (eight) hours as needed for itching Facility-Administered Medications: None       Past Medical History:   Diagnosis Date    Asthma     COPD [facility]chronic obstructive [facility] disease) ([facility]     Macular degeneration     right eye    Psychiatric disorder     depression, anxiety       Past Surgical History:   Procedure Laterality Date     SECTION      TONSILECTOMY AND ADNOIDECTOMY         Family History   Problem Relation Age of Onset    Macular degeneration Mother      I have reviewed and agree with the history as documented  Social History   Substance Use Topics    Smoking status: Current Every Day Smoker     Packs/day: 0 50     Types: Cigarettes    Smokeless tobacco: Never Used    Alcohol use No      Comment: socially /  never per [employer]        Review of Systems   Constitutional: Negative for chills and fever  HENT: Negative for rhinorrhea, sore throat and trouble swallowing  Eyes: Negative for pain  Respiratory: Negative for cough, shortness of breath, wheezing and stridor  Cardiovascular: Negative for chest pain and leg swelling  Gastrointestinal: Negative for abdominal pain, diarrhea and nausea  Endocrine: Negative for polyuria  Genitourinary: Positive for vaginal bleeding  Negative for dysuria, flank pain and urgency  Musculoskeletal: Negative for joint swelling, myalgias and neck stiffness  Skin: Positive for rash  Allergic/Immunologic: Negative for immunocompromised state  Neurological: Negative for dizziness, syncope, weakness, numbness and headaches  Psychiatric/Behavioral: Negative for confusion and suicidal ideas  All other systems reviewed and are negative        Physical Exam  ED Triage Vitals [18]   Temperature Pulse Respirations Blood Pressure SpO2   98 4 °F (36 9 °C) 70 18 166/80 100 %      Temp Source Heart Rate Source Patient Position - Orthostatic VS BP Location FiO2 (%)   Oral Monitor Sitting Left arm --      Pain Score       --           Orthostatic Vital Signs  Vitals: 04/20/18 2008   BP: 166/80   Pulse: 70   Patient Position - Orthostatic VS: Sitting       Physical Exam   Constitutional: She is oriented to person, place, and time  She appears well-developed and well-nourished  HENT:   Head: Normocephalic and atraumatic  Eyes: EOM are normal  Pupils are equal, round, and reactive to light  Neck: Normal range of motion  Neck supple  Cardiovascular: Normal rate and regular rhythm  Exam reveals no friction rub  No murmur heard  Pulmonary/Chest: Breath sounds normal  No respiratory distress  She has no wheezes  She has no rales  Abdominal: Soft  Bowel sounds are normal  She exhibits no distension  There is no tenderness  Genitourinary:   Genitourinary Comments: Offered pelvic examination in the emergency department  Patient declined   Musculoskeletal: Normal range of motion  She exhibits no edema or tenderness  Neurological: She is alert and oriented to person, place, and time  Skin: Skin is warm  No rash noted  Psychiatric: She has a normal mood and affect  Nursing note and vitals reviewed  ED Medications  Medications - No data to display    Diagnostic Studies  Results Reviewed     Procedure Component Value Units Date/Time    Urine culture [[id number]]     Lab Status:  No result Specimen:  Urine     UA w Reflex to Microscopic [[id number]]  (Abnormal) Collected:  04/20/18 2029    Lab Status:  Final result Specimen:  Urine from Urine, Clean Catch Updated:  04/20/18 2039     Color, UA Light Yellow     Clarity, UA Slightly Cloudy     Specific [place], UA 1 020     pH, UA 6 0     Leukocytes, UA Moderate (A)     Nitrite, UA Negative     Protein, UA Negative mg/dl      Glucose, UA Negative mg/dl      Ketones, UA Negative mg/dl      Urobilinogen, UA 0 2 E U /dl      Bilirubin, UA Negative     Blood, UA Large (A)    Urine Microscopic [[id number]] Collected:  04/20/18 2029    Lab Status:   In process Specimen:  Urine from Urine, Clean Catch Updated:  04/20/18 2039 8 [facility] amplified DNA by PCR [[id number]] Collected:  04/20/18 2029    Lab Status: In process Specimen:  Urine from Urethra Updated:  04/20/18 2038    POCT pregnancy, urine [[id number]]  (Normal) Resulted:  04/20/18 2034    Lab Status:  Final result Updated:  04/20/18 2035     EXT PREG TEST UR (Ref: Negative) neg                 No orders to display              Procedures  Procedures       Phone Contacts  ED Phone Contact    ED Course  ED Course                                MDM  Number of Diagnoses or Management Options  Scabies: new and requires workup  UTI (urinary tract infection): new and requires workup  Diagnosis management comments: 79-year-old female presents emergency department with a history of scabies she is currently on ivermectin did therapy  But symptoms started approximately 24 hours after the stop the ivermectin  Therapy  Patient also here vaginal bleeding  She was seen evaluated by [facility] attending who did a pelvic examination at that point time edema there is a small laceration that continues to intermittently bleed  Offer the patient to have a pelvic examination in the emergency department  She declined at this point time  Tested for urinary tract infection symptoms noted with small leukocytes  Symptoms likely related to urinary tract infection versus sexually transmitted disease  She does not have any vaginal discharge or other symptoms  Will treat for urinary tract infection  Sent off a GC and chlamydia testing  Refer to Dermatology for scabies given the fact that this is very recurrent she has tried permethrin she has tried steroids and now although treatments have failed  Will state and give prednisone for possible allergic reaction secondary to the scabies  Patient agrees with the plan for outpatient management discharge  Pt re-examined and evaluated after testing and treatment  Spoke with the patient and feeling improved and sxs have resolved   Will discharge home with close f/u with pcp and instructed to return to the ED if sxs worsen or continue  Pt agrees with the plan for discharge and feels comfortable to go home with proper f/u  Advised to return for worsening or additional problems  Diagnostic tests were reviewed and questions answered  Diagnosis, care plan and treatment options were discussed  The patient understand instructions and will follow up as directed          Amount and/or Complexity of Data Reviewed  Clinical lab tests: ordered and reviewed      CritCare Time    Disposition  Final diagnoses:   UTI (urinary tract infection)   Scabies     Time reflects when diagnosis was documented in both MDM as applicable and the Disposition within this note     Time User Action Codes Description Comment    4/20/2018  8:40 PM Ene AMATO Add [N39 0] UTI (urinary tract infection)     4/20/2018  8:40 PM Keri Zamora Add [B86] Scabies       ED Disposition     ED Disposition Condition Comment    Discharge  Brian 42 discharge to home/self care      Condition at discharge: Stable        Follow-up Information     Follow up With Specialties Details Why [facility], DO Family Medicine Call If symptoms worsen [address]   [phone]      [facility] Dermatology Call in 2 days  [facility] [address]  [phone]          Discharge Medication List as of 4/20/2018  8:42 PM      START taking these medications    Details   predniSONE 20 mg tablet Take 2 tablets (40 mg total) by mouth daily for 3 days, Starting Fri 4/20/2018, Until Mon 4/23/2018, Print      sulfamethoxazole-trimethoprim (BACTRIM DS) 800-160 mg per tablet Take 1 tablet by mouth 2 (two) times a day for 7 days smx-tmp DS (BACTRIM) 800-160 mg tabs (1tab q12 D10), Starting Fri 4/20/2018, Until Fri 4/27/2018, Normal         CONTINUE these medications which have NOT CHANGED    Details   albuterol (2 5 mg/3 mL) 0 083 % nebulizer solution Take 2 5 mg by nebulization 2 (two) times a day , Until Discontinued, Historical Med      albuterol (PROVENTIL HFA,VENTOLIN HFA) 90 mcg/act inhaler Inhale, Starting u 1/31/2008, Historical Med      hydrOXYzine HCL (ATARAX) 25 mg tablet Take 1 tablet (25 mg total) by mouth every 8 (eight) hours as needed for itching, Starting Mon 3/26/2018, Normal           No discharge procedures on file      ED Provider  Electronically Signed by           Roxana Carias DO  04/20/18 7650

## 2018-04-21 NOTE — DISCHARGE INSTRUCTIONS
Scabies   WHAT YOU NEED TO KNOW:   Scabies is a skin condition that is caused by scabies mites  Scabies mites are tiny bugs that burrow, lay eggs, and live underneath the skin  Scabies is spread through close contact with a person who has scabies  This includes having sex, sleeping in the same bed, or sharing towels or clothing  Scabies can spread quickly and must be treated as soon as it is found  DISCHARGE INSTRUCTIONS:   Seek care immediately if:   · You develop a fever and red, swollen, painful areas on your skin  Contact your healthcare provider if:   · The bites become crusty or filled with pus  · You have worsening itching after scabies treatment  · You have new bite or burrow marks after treatment  · You have questions or concerns about your condition or care  Medicines:   · Prescription creams  are used to treat scabies  You will need to apply them over all of your body from the neck down  Do not swallow this medicine  An oral medication may be ordered if scabies is severe  · Take your medicine as directed  Contact your healthcare provider if you think your medicine is not helping or if you have side effects  Tell him or her if you are allergic to any medicine  Keep a list of the medicines, vitamins, and herbs you take  Include the amounts, and when and why you take them  Bring the list or the pill bottles to follow-up visits  Carry your medicine list with you in case of an emergency  Follow up with your healthcare provider as directed:  Write down your questions so you remember to ask them during your visits  Prevent the spread of scabies:   · Have all family members use scabies medicine  Tell all sex partners and anyone who has shared your clothing or bed for the past month about the scabies  Tell them to ask their healthcare provider for scabies medicine even if they have no itching, rash, or burrow marks      · Wash all items that you have used since 3 days before you learned about your scabies  Use hot water to wash all clothing, bedding, and towels  Dry them for at least 20 minutes on the hot cycle of a dryer  Take items to be dry cleaned that cannot be washed in a washing machine  Place any clothing or bedding that cannot be washed or dry cleaned in a closed plastic bag for 1 week  · Do not have close body contact with anyone until the scabies mites are gone  Talk to your healthcare provider about how long you need to wait  Also ask about public places to avoid, such as the gym  Help relieve itching: Your skin may continue to itch for 2 or 3 weeks, even after the scabies mites are gone  Over-the-counter antihistamines or cortisone cream may help relieve itching  Trim your fingernails so you do not spread any mites that are still alive after treatment  Do not scratch your skin  Scratches may cause a skin infection  A cool bath may also help relieve the itching  Return to school or work: You may return to school or work 24 hours after using scabies medicine  © 2017 [address]  Information is for End User's use only and may not be sold, redistributed or otherwise used for commercial purposes  All illustrations and images included in CareNotes® are the copyrighted property of A D A M , Inc  or Didier Jolley  The above information is an  only  It is not intended as medical advice for individual conditions or treatments  Talk to your doctor, nurse or pharmacist before following any medical regimen to see if it is safe and effective for you  Urinary Tract Infection in Women, Faust[address] of Obstetricians and Gynecologists: ACOG Practice Bulletin No  91: Treatment of urinary tract infections in nonpregnant women  Obstet Gynecol 2008; 111(3):785-794  [employer]: Recurrent Lower Urinary Tract Infections in Women  [employer]  [place], [place]  2008   Available from URL: [email or web address] [email or web address]  aspx  As accessed 2009-04-07  Marck AMATO & Elly D : Use of Lactobacillus probiotics for bacterial genitourinary infections in women: a review  Clin Ther 2008; 30(3):453-468  Car J : Urinary tract infections in women: diagnosis and management in primary care  BMJ 2006; 332(6490):94-97  Vicenta ME , Oksana GI , Newton T , et al: Probiotics for prevention of recurrent urinary tract infections in women: a review of the evidence from microbiological and clinical studies  Drugs 2006; 66(9):2118-9610  Foster RT : Uncomplicated urinary tract infections in women  Obstet Gynecol Clin [place] 2008; 35(2):235-48, viii  [employer] Clinical Systems Improvement: Uncomplicated Urinary Tract Infrection in Women  [facility] Clinical Systems Improvement  [place], [place]  2006  Available from URL: [employer]   As accessed 2009-04-07  Susan Ray MA : Evidence-based practice for evaluation and management of female urinary tract infection  Urol Nurs 2007; 27(2):133-136  Billie Griffith , Cha Frye , et al: Cranberry or trimethoprim for the prevention of recurrent urinary tract infections? A randomized controlled trial in older women  J Antimicrob Chemother 2009; 63(2):389-395  Yeimi ARANGO , Ashwini J , et al: Uncomplicated urinary tract infection in women  Current practice and the effect of antibiotic resistance on empiric treatment  Can Fam Physician 2006; 05 14 56 71 73  Sharda C , Bharti M , Yosef R , et al: Oestrogens for preventing recurrent urinary tract infection in postmenopausal women  Marcela Database Syst Rev 2008; 2008(2):1-    Shanique MELGOZA , Carol MEEHAN , [facility] , et al: Differences in the pattern of antibiotic prescription profile and recurrence rate for possible urinary tract infections in women with and without diabetes  Diabetes Care 2008; 31(7):3904-0981  [place] JS & Byron FG: Urinary tract infection in women  Obstet Gynecol 2005; 106(5 Pt 1):8748-1002  © 2014 [address] is for End User's use only and may not be sold, redistributed or otherwise used for commercial purposes  All illustrations and images included in CareNotes® are the copyrighted property of [employer]  or Didier Jolley  The above information is an  only  It is not intended as medical advice for individual conditions or treatments  Talk to your doctor, nurse or pharmacist before following any medical regimen to see if it is safe and effective for you

## 2018-04-24 ENCOUNTER — TELEPHONE (OUTPATIENT)
Dept: FAMILY MEDICINE CLINIC | Facility: CLINIC | Age: 33
End: 2018-04-24

## 2018-04-24 ENCOUNTER — HOSPITAL ENCOUNTER (EMERGENCY)
Facility: HOSPITAL | Age: 33
Discharge: HOME | End: 2018-04-24
Attending: EMERGENCY MEDICINE | Admitting: EMERGENCY MEDICINE
Payer: MEDICARE

## 2018-04-24 VITALS
WEIGHT: 229 LBS | OXYGEN SATURATION: 99 % | SYSTOLIC BLOOD PRESSURE: 139 MMHG | HEART RATE: 90 BPM | TEMPERATURE: 98 F | BODY MASS INDEX: 35.87 KG/M2 | DIASTOLIC BLOOD PRESSURE: 77 MMHG | RESPIRATION RATE: 16 BRPM

## 2018-04-24 DIAGNOSIS — A54.9 GONORRHEA: Primary | ICD-10-CM

## 2018-04-24 PROCEDURE — 96372 THER/PROPH/DIAG INJ SC/IM: CPT

## 2018-04-24 PROCEDURE — 99283 EMERGENCY DEPT VISIT LOW MDM: CPT

## 2018-04-24 RX ORDER — AZITHROMYCIN 250 MG/1
1000 TABLET, FILM COATED ORAL ONCE
Status: COMPLETED | OUTPATIENT
Start: 2018-04-24 | End: 2018-04-24

## 2018-04-24 RX ADMIN — LIDOCAINE HYDROCHLORIDE 250 MG: 10 INJECTION, SOLUTION EPIDURAL; INFILTRATION; INTRACAUDAL; PERINEURAL at 10:40

## 2018-04-24 RX ADMIN — AZITHROMYCIN 1000 MG: 250 TABLET, FILM COATED ORAL at 10:39

## 2018-04-24 NOTE — ED PROVIDER NOTES
History  Chief Complaint   Patient presents with    Exposure to STD     pt called for positive gohorrhea testing      33 y/o female presenting for treatment of gonorrhea  Was called back due to positive urine cultures  Was recently here and diagnosed with UTI with dysuria  Denies fevers, abdominal pain or cramping, vaginal discharge or bleeding  Prior to Admission Medications   Prescriptions Last Dose Informant Patient Reported? Taking? albuterol (2 5 mg/3 mL) 0 083 % nebulizer solution   Yes No   Sig: Take 2 5 mg by nebulization 2 (two) times a day  albuterol (PROVENTIL HFA,VENTOLIN HFA) 90 mcg/act inhaler   Yes No   Sig: Inhale   hydrOXYzine HCL (ATARAX) 25 mg tablet   No No   Sig: Take 1 tablet (25 mg total) by mouth every 8 (eight) hours as needed for itching   predniSONE 20 mg tablet   No No   Sig: Take 2 tablets (40 mg total) by mouth daily for 3 days   sulfamethoxazole-trimethoprim (BACTRIM DS) 800-160 mg per tablet   No No   Sig: Take 1 tablet by mouth 2 (two) times a day for 7 days smx-tmp DS (BACTRIM) 800-160 mg tabs (1tab q12 D10)      Facility-Administered Medications: None       Past Medical History:   Diagnosis Date    Asthma     COPD (chronic obstructive pulmonary disease) (HCC)     Macular degeneration     right eye    Psychiatric disorder     depression, anxiety       Past Surgical History:   Procedure Laterality Date     SECTION      TONSILECTOMY AND ADNOIDECTOMY         Family History   Problem Relation Age of Onset    Macular degeneration Mother      I have reviewed and agree with the history as documented  Social History   Substance Use Topics    Smoking status: Current Every Day Smoker     Packs/day: 0 50     Types: Cigarettes    Smokeless tobacco: Never Used    Alcohol use No      Comment: socially /  never per [employer]        Review of Systems   Constitutional: Negative  HENT: Negative  Eyes: Negative  Respiratory: Negative      Cardiovascular: Negative  Gastrointestinal: Negative  Genitourinary: Positive for dysuria  Negative for decreased urine volume, difficulty urinating, dyspareunia, enuresis, flank pain, frequency, genital sores, hematuria, menstrual problem, pelvic pain, urgency, vaginal bleeding and vaginal discharge  Musculoskeletal: Negative  Skin: Negative  Neurological: Negative  All other systems reviewed and are negative  Physical Exam  ED Triage Vitals [04/24/18 1019]   Temperature Pulse Respirations Blood Pressure SpO2   98 °F (36 7 °C) 90 16 139/77 99 %      Temp Source Heart Rate Source Patient Position - Orthostatic VS BP Location FiO2 (%)   Oral Monitor Sitting Right arm --      Pain Score       No Pain           Orthostatic Vital Signs  Vitals:    04/24/18 1019   BP: 139/77   Pulse: 90   Patient Position - Orthostatic VS: Sitting       Physical Exam   Constitutional: She is oriented to person, place, and time  She appears well-developed and well-nourished  HENT:   Head: Normocephalic and atraumatic  Eyes: Conjunctivae and EOM are normal  Pupils are equal, round, and reactive to light  Neck: Normal range of motion  Neck supple  Cardiovascular: Normal rate  Pulmonary/Chest: Effort normal    spo2 is 99% indicating adequate oxygenation  Neurological: She is alert and oriented to person, place, and time  Skin: Skin is warm and dry  Capillary refill takes less than 2 seconds  Rash noted  Being treated for scabies currently  Nursing note and vitals reviewed        ED Medications  Medications   cefTRIAXone (ROCEPHIN) 250 mg in lidocaine (PF) (XYLOCAINE-MPF) 1 % IM only syringe (250 mg Intramuscular Given 4/24/18 1040)   azithromycin (ZITHROMAX) tablet 1,000 mg (1,000 mg Oral Given 4/24/18 1039)       Diagnostic Studies  Results Reviewed     None                 No orders to display              Procedures  Procedures       Phone Contacts  ED Phone Contact    ED Course  ED Course MDM  Number of Diagnoses or Management Options  Diagnosis management comments: Patient was treated for gonorrhea and chlamydia  Recommended dermatology f/u for her chronic scabies and rash  Informed patient to make sexual partners aware of her diagnosis as they need to be treated and tested as well  Patient is informed to return to the emergency department for worsening of symptoms and was given proper education regarding their diagnosis and symptoms  Otherwise the patient is informed to follow up with their primary care doctor for re-evaluation  The patient verbalizes understanding and agrees with above assessment and plan  All questions were answered  Please Note: Fluency Direct voice recognition software may have been used in the creation of this document  Wrong words or sound a like substitutions may have occurred due to the inherent limitations of the voice software  Amount and/or Complexity of Data Reviewed  Clinical lab tests: reviewed  Review and summarize past medical records: yes  Independent visualization of images, tracings, or specimens: yes      CritCare Time    Disposition  Final diagnoses:   Gonorrhea     Time reflects when diagnosis was documented in both MDM as applicable and the Disposition within this note     Time User Action Codes Description Comment    4/24/2018 11:08 AM Tammy Butts Add [A54 9] Gonorrhea       ED Disposition     ED Disposition Condition Comment    Discharge  Brian 42 discharge to home/self care      Condition at discharge: Good        Follow-up Information     Follow up With Specialties Details Why Contact Info Additional [address] Emergency Department Emergency Medicine Go to If symptoms worsen [address] [facility]  [phone] [facility] ED, Abdiaziz Massey [place], Isidoro Noblia [address], DO Family Medicine Schedule an appointment as soon as possible for a visit As needed [address]   [phone]           Patient's Medications   Discharge Prescriptions    No medications on file     No discharge procedures on file      ED Provider  Electronically Signed by           Maryann Marie PA-C  04/24/18 1104

## 2018-04-24 NOTE — TELEPHONE ENCOUNTER
Dr Shavon Russo  YOU GAVE HER MEDICATION  SHE WOULD LIKE A REFILL FOR THAT AGAIN  SHE SAID SHE HAS MORE SCABIES  SHE WANTS IT SENT TO [facility] [address]  SHE WOULD LIKE THIS TO BE DONE RIGHT AWAY

## 2018-04-24 NOTE — DISCHARGE INSTRUCTIONS
Gonorrhea   WHAT YOU NEED TO KNOW:   Gonorrhea, or gonococcal urethritis, is a sexually transmitted infection (STI) caused by bacteria  It is spread by unprotected oral, vaginal, or anal sex  Gonorrhea causes inflammation of the urethra  The urethra is the tube where urine passes from the bladder to the outside of the body  Anyone with multiple sexual partners is at higher risk for gonorrhea  DISCHARGE INSTRUCTIONS:   Return to the emergency department if:   · You have chest pain or trouble breathing  · You have pain and swelling in your scrotum  · You have pain in your abdomen or joints  Contact your healthcare provider if:   · You have a fever  · You have chills, a cough, or feel weak and achy  · You have questions or concerns about your condition or care  Medicines:   · Antibiotics  help treat the infection caused by bacteria  Both you and your sexual partner have to be treated to prevent gonorrhea from spreading  · Take your medicine as directed  Contact your healthcare provider if you think your medicine is not helping or if you have side effects  Tell him of her if you are allergic to any medicine  Keep a list of the medicines, vitamins, and herbs you take  Include the amounts, and when and why you take them  Bring the list or the pill bottles to follow-up visits  Carry your medicine list with you in case of an emergency  Prevent the spread of gonorrhea:   · Use a condom  during oral, vaginal, and anal sex  Ask for more information about the correct way to use condoms  · Do not have sex with someone who has gonorrhea  This includes oral, vaginal, and anal sex  · Do not have sex while you or your partner are being treated for gonorrhea  Ask when it is safe to have sex  · Tell your healthcare provider if you are pregnant  Gonorrhea can be passed to an infant during birth    Follow up with your healthcare provider as directed:  Write down your questions so you remember to ask them during your visits  © 2017 [address] Information is for End User's use only and may not be sold, redistributed or otherwise used for commercial purposes  All illustrations and images included in CareNotes® are the copyrighted property of A D A M , Inc  or Didier Jolley  The above information is an  only  It is not intended as medical advice for individual conditions or treatments  Talk to your doctor, nurse or pharmacist before following any medical regimen to see if it is safe and effective for you

## 2018-04-25 ENCOUNTER — VBI (OUTPATIENT)
Dept: FAMILY MEDICINE CLINIC | Facility: CLINIC | Age: 33
End: 2018-04-25

## 2018-04-25 DIAGNOSIS — B86 SCABIES: Primary | ICD-10-CM

## 2018-04-25 RX ORDER — IVERMECTIN 3 MG/1
200 TABLET ORAL ONCE
Qty: 7 TABLET | Refills: 0 | Status: SHIPPED | OUTPATIENT
Start: 2018-04-25 | End: 2018-04-25

## 2018-04-25 NOTE — TELEPHONE ENCOUNTER
S/W pt re: ED follow up  Apt made for Friday 4/27/18 as pt was back in the ED on 4/24/18  Pt aware of on call doctor, hours, and phone number  CC: Vaginal bleed/rash  DX: UTI    CE

## 2018-04-27 ENCOUNTER — OFFICE VISIT (OUTPATIENT)
Dept: FAMILY MEDICINE CLINIC | Facility: CLINIC | Age: 33
End: 2018-04-27
Payer: MEDICARE

## 2018-04-27 VITALS
HEIGHT: 67 IN | DIASTOLIC BLOOD PRESSURE: 62 MMHG | SYSTOLIC BLOOD PRESSURE: 110 MMHG | RESPIRATION RATE: 18 BRPM | OXYGEN SATURATION: 98 % | HEART RATE: 72 BPM | BODY MASS INDEX: 37.2 KG/M2 | TEMPERATURE: 98 F | WEIGHT: 237 LBS

## 2018-04-27 DIAGNOSIS — Z72.51 HIGH RISK SEXUAL BEHAVIOR: Primary | ICD-10-CM

## 2018-04-27 LAB
CHLAMYDIA DNA CVX QL NAA+PROBE: ABNORMAL
N GONORRHOEA DNA GENITAL QL NAA+PROBE: ABNORMAL

## 2018-04-27 PROCEDURE — 99213 OFFICE O/P EST LOW 20 MIN: CPT | Performed by: FAMILY MEDICINE

## 2018-04-28 NOTE — PROGRESS NOTES
Assessment/Plan:    No problem-specific Assessment & Plan notes found for this encounter  Diagnoses and all orders for this visit:    High risk sexual behavior  + Gonorrhea, received treatment  Counseled on safe sex practices, always use barrier contraception to prevent STIs  Will test for STIs and call pt with results  -     RPR; Future  -     Hepatitis C antibody; Future  -     Hepatitis B surface antigen; Future  -     HIV 1/2 Antigen/Antibody,Fourth Generation W/Rfl; Future  -     HSV 1/2 IgM and Type Specific IgG; Future        Subjective:      Patient ID: Som Briggs is a 34 y o  female  35 yo female at office for STD testing  Pt recently went to ED for UTI sx and found she was tested positive for gonorrhea, she got treatment for this and informed her partner  Today she notes she would like to be tested for STDs besides GC/CL to ensure she doesn't have any other STIs  Denies vag discharge/odor, pelvic pain, genital lesions  The following portions of the patient's history were reviewed and updated as appropriate: allergies, current medications, past family history, past medical history, past social history, past surgical history and problem list     Review of Systems   Constitutional: Negative for chills, fatigue and fever  Respiratory: Negative for shortness of breath  Cardiovascular: Negative for chest pain  Gastrointestinal: Negative for abdominal pain, constipation and vomiting  Genitourinary: Negative for difficulty urinating, dyspareunia, dysuria, frequency, hematuria, pelvic pain, urgency and vaginal bleeding  Skin: Negative for rash  Neurological: Negative for dizziness, light-headedness and numbness  Objective:      /62   Pulse 72   Temp 98 °F (36 7 °C)   Resp 18   Ht 5' 7" (1 702 m)   Wt 108 kg (237 lb)   LMP 03/27/2018 (Exact Date)   SpO2 98%   BMI 37 12 kg/m²          Physical Exam   Constitutional: She is oriented to person, place, and time  She appears well-developed and well-nourished  No distress  Cardiovascular: Normal rate, regular rhythm, normal heart sounds and intact distal pulses  Pulmonary/Chest: Effort normal and breath sounds normal    Abdominal: Soft  Bowel sounds are normal    Musculoskeletal: Normal range of motion  She exhibits no edema  Neurological: She is alert and oriented to person, place, and time  Skin: Skin is warm  No rash noted  She is not diaphoretic  Nursing note and vitals reviewed

## 2018-05-02 LAB
HBV SURFACE AG SERPL QL IA: NEGATIVE
HCV AB S/CO SERPL IA: <0.1 S/CO RATIO (ref 0–0.9)
HIV 1+2 AB+HIV1 P24 AG SERPL QL IA: NON REACTIVE
HSV1 IGG SER IA-ACNC: 30.5 INDEX (ref 0–0.9)
HSV1+2 IGM SER IA-ACNC: <0.91 RATIO (ref 0–0.9)
HSV2 IGG SER IA-ACNC: 14.1 INDEX (ref 0–0.9)
RPR SER QL: NON REACTIVE

## 2018-05-09 ENCOUNTER — APPOINTMENT (EMERGENCY)
Dept: RADIOLOGY | Facility: HOSPITAL | Age: 33
End: 2018-05-09
Payer: MEDICARE

## 2018-05-09 ENCOUNTER — HOSPITAL ENCOUNTER (EMERGENCY)
Facility: HOSPITAL | Age: 33
Discharge: HOME | End: 2018-05-09
Attending: EMERGENCY MEDICINE | Admitting: EMERGENCY MEDICINE
Payer: MEDICARE

## 2018-05-09 VITALS
OXYGEN SATURATION: 97 % | HEIGHT: 67 IN | WEIGHT: 220 LBS | RESPIRATION RATE: 18 BRPM | TEMPERATURE: 97 F | BODY MASS INDEX: 34.53 KG/M2 | SYSTOLIC BLOOD PRESSURE: 128 MMHG | DIASTOLIC BLOOD PRESSURE: 66 MMHG | HEART RATE: 72 BPM

## 2018-05-09 DIAGNOSIS — S93.401A RIGHT ANKLE SPRAIN: ICD-10-CM

## 2018-05-09 DIAGNOSIS — M54.31 SCIATICA OF RIGHT SIDE: Primary | ICD-10-CM

## 2018-05-09 DIAGNOSIS — S39.012A LUMBOSACRAL STRAIN, INITIAL ENCOUNTER: ICD-10-CM

## 2018-05-09 LAB
ANION GAP SERPL CALCULATED.3IONS-SCNC: 7 MMOL/L (ref 4–13)
APTT PPP: 30 SECONDS (ref 24–33)
BASOPHILS # BLD AUTO: 0 THOUSANDS/ΜL (ref 0–0.1)
BASOPHILS NFR BLD AUTO: 1 % (ref 0–1)
BUN SERPL-MCNC: 14 MG/DL (ref 5–25)
CALCIUM SERPL-MCNC: 9 MG/DL (ref 8.3–10.1)
CHLORIDE SERPL-SCNC: 104 MMOL/L (ref 100–108)
CO2 SERPL-SCNC: 26 MMOL/L (ref 21–32)
CREAT SERPL-MCNC: 0.86 MG/DL (ref 0.6–1.3)
DEPRECATED D DIMER PPP: <190 NG/ML (FEU) (ref 190–520)
EOSINOPHIL # BLD AUTO: 0.1 THOUSAND/ΜL (ref 0–0.61)
EOSINOPHIL NFR BLD AUTO: 1 % (ref 0–6)
ERYTHROCYTE [DISTWIDTH] IN BLOOD BY AUTOMATED COUNT: 14.6 % (ref 11.6–15.1)
GFR SERPL CREATININE-BSD FRML MDRD: 89 ML/MIN/1.73SQ M
GLUCOSE SERPL-MCNC: 81 MG/DL (ref 65–140)
HCT VFR BLD AUTO: 44 % (ref 37–47)
HGB BLD-MCNC: 14.4 G/DL (ref 12–16)
INR PPP: 1.02 (ref 0.86–1.16)
LYMPHOCYTES # BLD AUTO: 2.4 THOUSANDS/ΜL (ref 0.6–4.47)
LYMPHOCYTES NFR BLD AUTO: 31 % (ref 14–44)
MCH RBC QN AUTO: 31.6 PG (ref 27–31)
MCHC RBC AUTO-ENTMCNC: 32.7 G/DL (ref 31.4–37.4)
MCV RBC AUTO: 97 FL (ref 82–98)
MONOCYTES # BLD AUTO: 0.5 THOUSAND/ΜL (ref 0.17–1.22)
MONOCYTES NFR BLD AUTO: 7 % (ref 4–12)
NEUTROPHILS # BLD AUTO: 4.7 THOUSANDS/ΜL (ref 1.85–7.62)
NEUTS SEG NFR BLD AUTO: 61 % (ref 43–75)
NRBC BLD AUTO-RTO: 0 /100 WBCS
PLATELET # BLD AUTO: 250 THOUSANDS/UL (ref 130–400)
PMV BLD AUTO: 8.4 FL (ref 8.9–12.7)
POTASSIUM SERPL-SCNC: 4.5 MMOL/L (ref 3.5–5.3)
PROTHROMBIN TIME: 10.7 SECONDS (ref 9.4–11.7)
RBC # BLD AUTO: 4.56 MILLION/UL (ref 4.2–5.4)
SODIUM SERPL-SCNC: 137 MMOL/L (ref 136–145)
WBC # BLD AUTO: 7.7 THOUSAND/UL (ref 4.8–10.8)

## 2018-05-09 PROCEDURE — 99283 EMERGENCY DEPT VISIT LOW MDM: CPT

## 2018-05-09 PROCEDURE — 85610 PROTHROMBIN TIME: CPT | Performed by: PHYSICIAN ASSISTANT

## 2018-05-09 PROCEDURE — 80048 BASIC METABOLIC PNL TOTAL CA: CPT | Performed by: PHYSICIAN ASSISTANT

## 2018-05-09 PROCEDURE — 36415 COLL VENOUS BLD VENIPUNCTURE: CPT | Performed by: PHYSICIAN ASSISTANT

## 2018-05-09 PROCEDURE — 85730 THROMBOPLASTIN TIME PARTIAL: CPT | Performed by: PHYSICIAN ASSISTANT

## 2018-05-09 PROCEDURE — 72100 X-RAY EXAM L-S SPINE 2/3 VWS: CPT

## 2018-05-09 PROCEDURE — 85025 COMPLETE CBC W/AUTO DIFF WBC: CPT | Performed by: PHYSICIAN ASSISTANT

## 2018-05-09 PROCEDURE — 73610 X-RAY EXAM OF ANKLE: CPT

## 2018-05-09 PROCEDURE — 85379 FIBRIN DEGRADATION QUANT: CPT | Performed by: PHYSICIAN ASSISTANT

## 2018-05-09 RX ORDER — NAPROXEN 500 MG/1
500 TABLET ORAL 2 TIMES DAILY WITH MEALS
Qty: 20 TABLET | Refills: 0 | Status: SHIPPED | OUTPATIENT
Start: 2018-05-09 | End: 2018-06-26

## 2018-05-09 RX ORDER — CYCLOBENZAPRINE HCL 10 MG
10 TABLET ORAL 3 TIMES DAILY PRN
Qty: 12 TABLET | Refills: 0 | Status: SHIPPED | OUTPATIENT
Start: 2018-05-09 | End: 2018-06-26

## 2018-05-09 RX ORDER — NAPROXEN 500 MG/1
500 TABLET ORAL ONCE
Status: COMPLETED | OUTPATIENT
Start: 2018-05-09 | End: 2018-05-09

## 2018-05-09 RX ORDER — CYCLOBENZAPRINE HCL 10 MG
10 TABLET ORAL ONCE
Status: COMPLETED | OUTPATIENT
Start: 2018-05-09 | End: 2018-05-09

## 2018-05-09 RX ADMIN — NAPROXEN 500 MG: 500 TABLET ORAL at 11:59

## 2018-05-09 RX ADMIN — CYCLOBENZAPRINE HYDROCHLORIDE 10 MG: 10 TABLET, FILM COATED ORAL at 11:59

## 2018-05-09 NOTE — DISCHARGE INSTRUCTIONS
Ankle Sprain   WHAT YOU NEED TO KNOW:   An ankle sprain happens when 1 or more ligaments in your ankle joint stretch or tear  Ligaments are tough tissues that connect bones  Ligaments support your joints and keep your bones in place  DISCHARGE INSTRUCTIONS:   Return to the emergency department if:   · You have severe pain in your ankle  · Your foot or toes are cold or numb  · Your ankle becomes more weak or unstable (wobbly)  · You are unable to put any weight on your ankle or foot  · Your swelling has increased or returned  Contact your healthcare provider if:   · Your pain does not go away, even after treatment  · You have questions or concerns about your condition or care  Medicines: You may need any of the following:  · NSAIDs , such as ibuprofen, help decrease swelling, pain, and fever  This medicine is available with or without a doctor's order  NSAIDs can cause stomach bleeding or kidney problems in certain people  If you take blood thinner medicine, always ask your healthcare provider if NSAIDs are safe for you  Always read the medicine label and follow directions  · Acetaminophen  decreases pain  It is available without a doctor's order  Ask how much to take and how often to take it  Follow directions  Acetaminophen can cause liver damage if not taken correctly  · Prescription pain medicine  may be given  Ask how to take this medicine safely  · Take your medicine as directed  Contact your healthcare provider if you think your medicine is not helping or if you have side effects  Tell him or her if you are allergic to any medicine  Keep a list of the medicines, vitamins, and herbs you take  Include the amounts, and when and why you take them  Bring the list or the pill bottles to follow-up visits  Carry your medicine list with you in case of an emergency    Self care:   · Use support devices,  such as a brace, cast, or splint, may be needed to limit your movement and protect your joint  You may need to use crutches to decrease your pain as you move around  · Go to physical therapy as directed  A physical therapist teaches you exercises to help improve movement and strength, and to decrease pain  · Rest  your ankle so that it can heal  Return to normal activities as directed  · Apply ice on your ankle for 15 to 20 minutes every hour or as directed  Use an ice pack, or put crushed ice in a plastic bag  Cover it with a towel  Ice helps prevent tissue damage and decreases swelling and pain  · Compress  your ankle  Ask if you should wrap an elastic bandage around your injured ligament  An elastic bandage provides support and helps decrease swelling and movement so your joint can heal  Wear as long as directed  · Elevate  your ankle above the level of your heart as often as you can  This will help decrease swelling and pain  Prop your ankle on pillows or blankets to keep it elevated comfortably  Prevent another ankle sprain:   · Let your ankle heal   Find out how long your ligament needs to heal  Do not do any physical activity until your healthcare provider says it is okay  If you start activity too soon, you may develop a more serious injury  · Always warm up and stretch  before you exercise or play sports  · Use the right equipment  Always wear shoes that fit well and are made for the activity that you are doing  You may also need ankle supports, elbow and knee pads, or braces  Follow up with your healthcare provider as directed:  Write down your questions so you remember to ask them during your visits  © 2017 [address] Information is for End User's use only and may not be sold, redistributed or otherwise used for commercial purposes  All illustrations and images included in CareNotes® are the copyrighted property of [employer]  or Didier Jolley  The above information is an  only   It is not intended as medical advice for individual conditions or treatments  Talk to your doctor, nurse or pharmacist before following any medical regimen to see if it is safe and effective for you  Low Back Strain, Ambulatory Care   GENERAL INFORMATION:   Low back strain  is an injury to your lower back muscles or tendons  Tendons are strong tissues that connect muscles to bones  The lower back supports most of your body weight and helps you move, twist, and bend  Low back strain is usually caused by activities that increase stress on the lower back, such as exercise or injury  Common symptoms include the following:   · Low back pain or muscle spasms    · Stiffness or limited movement    · Pain that goes down to the buttocks, groin, or legs    · Pain that is worse with activity  Seek immediate care for the following symptoms:   · A pop in your lower back    · Increased swelling or pain in your lower back    · Trouble moving your legs    · Numbness in your legs  Treatment for low back strain:   · NSAIDs  help decrease swelling and pain or fever  This medicine is available with or without a doctor's order  NSAIDs can cause stomach bleeding or kidney problems in certain people  If you take blood thinner medicine, always ask your healthcare provider if NSAIDs are safe for you  Always read the medicine label and follow directions  · Muscle relaxers  help decrease muscle spasms pain  · Prescription pain medicine  may be given  Ask how to take this medicine safely  Manage your symptoms:   · Rest  in bed after your injury  Slowly start to increase your activity as the pain decreases, or as directed  · Apply ice  on your lower back for 15 to 20 minutes every hour or as directed  Use an ice pack, or put crushed ice in a plastic bag  Cover it with a towel  Ice helps prevent tissue damage and decreases swelling and pain  You can alternate ice and heat      · Apply heat  on your lower back for 20 to 30 minutes every 2 hours for as many days as directed  Heat helps decrease pain and muscle spasms  Prevent another low back strain:   · Use proper body mechanics  ¨ Bend at the hips and knees when you  objects  Do not bend from the waist  Use your leg muscles as you lift the load  Do not use your back  Keep the object close to your chest as you lift it  Try not to twist or lift anything above your waist     ¨ Change your position often when you stand for long periods of time  Rest one foot on a small box or footrest, and then switch to the other foot often  ¨ Try not to sit for long periods of time  When you do, sit in a straight-backed chair with your feet flat on the floor  Never reach, pull, or push while you are sitting  · Exercise regularly  Warm up before you exercise  Do exercises that strengthen your back muscles  Ask about the best exercise plan for you  · Maintain a healthy weight  Ask your healthcare provider how much you should weigh  Ask him to help you create a weight loss plan if you are overweight  Follow up with your healthcare provider as directed:  Write down your questions so you remember to ask them during your visits  CARE AGREEMENT:   You have the right to help plan your care  Learn about your health condition and how it may be treated  Discuss treatment options with your caregivers to decide what care you want to receive  You always have the right to refuse treatment  The above information is an  only  It is not intended as medical advice for individual conditions or treatments  Talk to your doctor, nurse or pharmacist before following any medical regimen to see if it is safe and effective for you  © 2014 [address] is for End User's use only and may not be sold, redistributed or otherwise used for commercial purposes  All illustrations and images included in CareNotes® are the copyrighted property of A D A M , Inc  or Didier Jolley

## 2018-05-09 NOTE — ED PROVIDER NOTES
History  Chief Complaint   Patient presents with    Ankle Pain     patient states she twisted her right ankle x 3 in the past week and a half  also c/o lump behind her right knee and states since last night began with numbness from her right knee down to her right toes  39-year-old female presenting today with lower back pain that radiates into the right thigh ranking 7/10  States that this has been ongoing for the past few days  Had a fall about a week or so ago where she landed directly onto her buttock, has had lower back pain since that point  States that she is on disability  States that she also has had right ankle pain after multiple inversion injuries  Relays that she rolled her ankle frequently  Concerned as she has noticed some swelling to the ankle and has had some calf pain  Family history of DVTs  She herself has never had history of this  No recent travel  She does have a smoking history  She is not on birth control  Notes some minor bruising to the right thigh  States she works as a  and started working for her friends [address] [place] [address], however then states she is on disability and does not work  Denies numbness, paresthesias, saddle anesthesia, weakness, fevers, nausea, vomiting, calf swelling  Prior to Admission Medications   Prescriptions Last Dose Informant Patient Reported? Taking? albuterol (2 5 mg/3 mL) 0 083 % nebulizer solution   Yes Yes   Sig: Take 2 5 mg by nebulization 2 (two) times a day     albuterol (PROVENTIL HFA,VENTOLIN HFA) 90 mcg/act inhaler   Yes Yes   Sig: Inhale 2 puffs every 6 (six) hours as needed        Facility-Administered Medications: None       Past Medical History:   Diagnosis Date    Asthma     COPD (chronic obstructive pulmonary disease) (HCC)     Macular degeneration     right eye    Psychiatric disorder     depression, anxiety       Past Surgical History:   Procedure Laterality Date     SECTION      TONSILECTOMY AND ADNOIDECTOMY         Family History   Problem Relation Age of Onset    Macular degeneration Mother      I have reviewed and agree with the history as documented  Social History   Substance Use Topics    Smoking status: Current Every Day Smoker     Packs/day: 0 50     Types: Cigarettes    Smokeless tobacco: Never Used    Alcohol use No      Comment: socially /  never per [employer]        Review of Systems   Constitutional: Negative  Negative for chills, fever and unexpected weight change  HENT: Negative  Eyes: Negative  Respiratory: Negative  Negative for cough, chest tightness, shortness of breath and wheezing  Cardiovascular: Negative  Negative for chest pain and palpitations  Gastrointestinal: Negative  Negative for abdominal pain, constipation, diarrhea, nausea and vomiting  Genitourinary: Negative  Negative for difficulty urinating, dysuria, flank pain, frequency, hematuria and urgency  Denies numbness, tingling in the groin  Musculoskeletal: Positive for arthralgias, back pain and joint swelling  Negative for gait problem, myalgias, neck pain and neck stiffness  Skin: Negative  Negative for color change  Neurological: Negative  Negative for dizziness, tremors, weakness, light-headedness, numbness and headaches  All other systems reviewed and are negative  Physical Exam  ED Triage Vitals [05/09/18 1029]   Temperature Pulse Respirations Blood Pressure SpO2   (!) 97 °F (36 1 °C) 72 18 128/66 97 %      Temp Source Heart Rate Source Patient Position - Orthostatic VS BP Location FiO2 (%)   Tympanic Monitor Sitting Right arm --      Pain Score       9           Orthostatic Vital Signs  Vitals:    05/09/18 1029   BP: 128/66   Pulse: 72   Patient Position - Orthostatic VS: Sitting       Physical Exam   Constitutional: She is oriented to person, place, and time  She appears well-developed and well-nourished  HENT:   Head: Normocephalic and atraumatic  Eyes: Conjunctivae are normal    Cardiovascular: Normal rate, regular rhythm, normal heart sounds and intact distal pulses  Exam reveals no gallop and no friction rub  No murmur heard  Pulmonary/Chest: Effort normal and breath sounds normal  No respiratory distress  She has no wheezes  She has no rales  She exhibits no tenderness  sPO2 is 97% indicating adequate oxygenation  Abdominal: Soft  Bowel sounds are normal    Musculoskeletal:        Right ankle: No head of 5th metatarsal tenderness found  Back:         Legs:       Feet:    No reproducible calf tenderness  Negative Homans sign  Neurological: She is alert and oriented to person, place, and time  Skin: Skin is warm and dry  Capillary refill takes less than 2 seconds  Nursing note and vitals reviewed        ED Medications  Medications   naproxen (NAPROSYN) tablet 500 mg (500 mg Oral Given 5/9/18 1159)   cyclobenzaprine (FLEXERIL) tablet 10 mg (10 mg Oral Given 5/9/18 1159)       Diagnostic Studies  Results Reviewed     Procedure Component Value Units Date/Time    APTT [[id number]]  (Normal) Collected:  05/09/18 1111    Lab Status:  Final result Specimen:  Blood from Arm, Right Updated:  05/09/18 1137     PTT 30 seconds     D-dimer, quantitative [[id number]]  (Abnormal) Collected:  05/09/18 1111    Lab Status:  Final result Specimen:  Blood from Arm, Right Updated:  05/09/18 1137     D-Dimer, Quant <190 (L) ng/ml (FEU)     Protime-INR [[id number]]  (Normal) Collected:  05/09/18 1111    Lab Status:  Final result Specimen:  Blood from Arm, Right Updated:  05/09/18 1137     Protime 10 7 seconds      INR 5 95    Basic metabolic panel [[id number]] Collected:  05/09/18 1111    Lab Status:  Final result Specimen:  Blood from Arm, Right Updated:  05/09/18 1130     Sodium 137 mmol/L      Potassium 4 5 mmol/L      Chloride 104 mmol/L      CO2 26 mmol/L      Anion Gap 7 mmol/L      BUN 14 mg/dL      Creatinine 0 86 mg/dL      Glucose 81 mg/dL      Calcium 9 0 mg/dL      eGFR 89 ml/min/1 73sq m     Narrative:         National Kidney Disease Education Program recommendations are as follows:  GFR calculation is accurate only with a steady state creatinine  Chronic Kidney disease less than 60 ml/min/1 73 sq  meters  Kidney failure less than 15 ml/min/1 73 sq  meters  CBC and differential [[id number]]  (Abnormal) Collected:  05/09/18 1111    Lab Status:  Final result Specimen:  Blood from Arm, Right Updated:  05/09/18 1124     WBC 7 70 Thousand/uL      RBC 4 56 Million/uL      Hemoglobin 14 4 g/dL      Hematocrit 44 0 %      MCV 97 fL      MCH 31 6 (H) pg      MCHC 32 7 g/dL      RDW 14 6 %      MPV 8 4 (L) fL      Platelets 766 Thousands/uL      nRBC 0 /100 WBCs      Neutrophils Relative 61 %      Lymphocytes Relative 31 %      Monocytes Relative 7 %      Eosinophils Relative 1 %      Basophils Relative 1 %      Neutrophils Absolute 4 70 Thousands/µL      Lymphocytes Absolute 2 40 Thousands/µL      Monocytes Absolute 0 50 Thousand/µL      Eosinophils Absolute 0 10 Thousand/µL      Basophils Absolute 0 00 Thousands/µL                  XR lumbar spine 2 or 3 views    (Results Pending)   XR ankle 3+ views RIGHT    (Results Pending)              Procedures  Procedures       Phone Contacts  ED Phone Contact    ED Course                               MDM  Number of Diagnoses or Management Options  Lumbosacral strain, initial encounter:   Right ankle sprain:   Sciatica of right side:   Diagnosis management comments: Negative D-dimer  Unlikely DVT at this time, however given proper education regarding this  Shoule patient persist to have right ankle pain or back pain she is follow up with her PCP or Orthopedics or return for worsening  Informed not to drive or operate heavy machinery while taking Flexeril  Patient is informed to return to the emergency department for worsening of symptoms and was given proper education regarding their diagnosis and symptoms   Otherwise the patient is informed to follow up with their primary care doctor for re-evaluation  The patient verbalizes understanding and agrees with above assessment and plan  All questions were answered  Please Note: Fluency Direct voice recognition software may have been used in the creation of this document  Wrong words or sound a like substitutions may have occurred due to the inherent limitations of the voice software  Amount and/or Complexity of Data Reviewed  Clinical lab tests: reviewed and ordered  Review and summarize past medical records: yes  Independent visualization of images, tracings, or specimens: yes      CritCare Time    Disposition  Final diagnoses:   Lumbosacral strain, initial encounter   Right ankle sprain   Sciatica of right side     Time reflects when diagnosis was documented in both MDM as applicable and the Disposition within this note     Time User Action Codes Description Comment    5/9/2018 12:13 PM Abe Conrad Add [S39 012A] Lumbosacral strain, initial encounter     5/9/2018 12:13 PM Mc Marie Juvencio Right ankle sprain     5/9/2018 12:14 PM Abe Conrad Add [M54 31] Sciatica of right side     5/9/2018 12:14 PM Abe Conrad Modify [S39 012A] Lumbosacral strain, initial encounter     5/9/2018 12:14 PM Abe Conrad Modify [M54 31] Sciatica of right side       ED Disposition     ED Disposition Condition Comment    Discharge  [address] discharge to home/self care      Condition at discharge: Good        Follow-up Information     Follow up With Specialties Details Why Contact Info Additional [address] Emergency Department Emergency Medicine Go to If symptoms worsen such as numbness or tingling in the groin, falls, fevers etc  [address] [facility], [place], [place], [facility], [place] Family Medicine Schedule an appointment as soon as possible for a visit As needed [address] [facility]  [address]           Patient's Medications   Discharge Prescriptions    CYCLOBENZAPRINE (FLEXERIL) 10 MG TABLET    Take 1 tablet (10 mg total) by mouth 3 (three) times a day as needed for muscle spasms for up to 4 days       Start Date: 5/9/2018  End Date: 5/13/2018       Order Dose: 10 mg       Quantity: 12 tablet    Refills: 0    NAPROXEN (NAPROSYN) 500 MG TABLET    Take 1 tablet (500 mg total) by mouth 2 (two) times a day with meals for 10 days       Start Date: 5/9/2018  End Date: 5/19/2018       Order Dose: 500 mg       Quantity: 20 tablet    Refills: 0     No discharge procedures on file      ED Provider  Electronically Signed by           Jozef Cr PA-C  05/09/18 0814

## 2018-05-11 ENCOUNTER — VBI (OUTPATIENT)
Dept: FAMILY MEDICINE CLINIC | Facility: CLINIC | Age: 33
End: 2018-05-11

## 2018-05-11 NOTE — TELEPHONE ENCOUNTER
Pt was seen in [address] on 5/9/18  CC: Ankle Pain  DX; Lumbosacral strain; Right ankle sprain; Sciatica of right side  Pt states that she is feeling better, and is taking RX prescribed  Pt is not in need of f/u at time  Informed Pt of on call, hours, and phone number

## 2018-06-14 ENCOUNTER — HOSPITAL ENCOUNTER (EMERGENCY)
Facility: HOSPITAL | Age: 33
Discharge: HOME | End: 2018-06-14
Attending: EMERGENCY MEDICINE | Admitting: EMERGENCY MEDICINE
Payer: MEDICARE

## 2018-06-14 VITALS
TEMPERATURE: 100.8 F | HEART RATE: 94 BPM | SYSTOLIC BLOOD PRESSURE: 131 MMHG | DIASTOLIC BLOOD PRESSURE: 59 MMHG | RESPIRATION RATE: 18 BRPM | BODY MASS INDEX: 34.53 KG/M2 | HEIGHT: 67 IN | WEIGHT: 220 LBS | OXYGEN SATURATION: 98 %

## 2018-06-14 DIAGNOSIS — J40 BRONCHITIS: Primary | ICD-10-CM

## 2018-06-14 DIAGNOSIS — H66.90 OTITIS MEDIA: ICD-10-CM

## 2018-06-14 PROCEDURE — 99283 EMERGENCY DEPT VISIT LOW MDM: CPT

## 2018-06-14 RX ORDER — ACETAMINOPHEN 325 MG/1
650 TABLET ORAL ONCE
Status: COMPLETED | OUTPATIENT
Start: 2018-06-14 | End: 2018-06-14

## 2018-06-14 RX ORDER — ONDANSETRON 4 MG/1
4 TABLET, ORALLY DISINTEGRATING ORAL EVERY 6 HOURS PRN
Qty: 15 TABLET | Refills: 0 | Status: SHIPPED | OUTPATIENT
Start: 2018-06-14 | End: 2018-08-13

## 2018-06-14 RX ORDER — IBUPROFEN 600 MG/1
600 TABLET ORAL ONCE
Status: COMPLETED | OUTPATIENT
Start: 2018-06-14 | End: 2018-06-14

## 2018-06-14 RX ORDER — AZITHROMYCIN 250 MG/1
500 TABLET, FILM COATED ORAL ONCE
Status: COMPLETED | OUTPATIENT
Start: 2018-06-14 | End: 2018-06-14

## 2018-06-14 RX ORDER — BENZONATATE 100 MG/1
100 CAPSULE ORAL EVERY 8 HOURS
Qty: 21 CAPSULE | Refills: 0 | Status: SHIPPED | OUTPATIENT
Start: 2018-06-14 | End: 2018-06-21

## 2018-06-14 RX ORDER — AZITHROMYCIN 250 MG/1
250 TABLET, FILM COATED ORAL EVERY 24 HOURS
Qty: 4 TABLET | Refills: 0 | Status: SHIPPED | OUTPATIENT
Start: 2018-06-14 | End: 2018-06-18

## 2018-06-14 RX ADMIN — IBUPROFEN 600 MG: 600 TABLET, FILM COATED ORAL at 20:42

## 2018-06-14 RX ADMIN — ACETAMINOPHEN 650 MG: 325 TABLET ORAL at 20:42

## 2018-06-14 RX ADMIN — AZITHROMYCIN MONOHYDRATE 500 MG: 250 TABLET ORAL at 20:42

## 2018-06-15 NOTE — DISCHARGE INSTRUCTIONS

## 2018-06-17 NOTE — ED PROVIDER NOTES
History  Chief Complaint   Patient presents with    Fever - 9 weeks to 74 years     pt states congestion, fever and nausea since 2100pm last night  pt states ear pain and cough     Patient presents for evaluation of congestion, cough, and fever starting yesterday  Also complaining of right ear pain  History provided by:  Patient   used: No    Fever - 9 weeks to 74 years   Associated symptoms: congestion, cough and ear pain        Prior to Admission Medications   Prescriptions Last Dose Informant Patient Reported? Taking? albuterol (2 5 mg/3 mL) 0 083 % nebulizer solution   Yes No   Sig: Take 2 5 mg by nebulization 2 (two) times a day  albuterol (PROVENTIL HFA,VENTOLIN HFA) 90 mcg/act inhaler   Yes No   Sig: Inhale 2 puffs every 6 (six) hours as needed     cyclobenzaprine (FLEXERIL) 10 mg tablet   No No   Sig: Take 1 tablet (10 mg total) by mouth 3 (three) times a day as needed for muscle spasms for up to 4 days   naproxen (NAPROSYN) 500 mg tablet   No No   Sig: Take 1 tablet (500 mg total) by mouth 2 (two) times a day with meals for 10 days      Facility-Administered Medications: None       Past Medical History:   Diagnosis Date    Asthma     COPD (chronic obstructive pulmonary disease) (HCC)     Macular degeneration     right eye    Psychiatric disorder     depression, anxiety       Past Surgical History:   Procedure Laterality Date     SECTION      TONSILECTOMY AND ADNOIDECTOMY         Family History   Problem Relation Age of Onset    Macular degeneration Mother      I have reviewed and agree with the history as documented  Social History   Substance Use Topics    Smoking status: Current Every Day Smoker     Packs/day: 0 50     Types: Cigarettes    Smokeless tobacco: Never Used    Alcohol use No      Comment: socially /  never per [employer]        Review of Systems   Constitutional: Positive for fever  HENT: Positive for congestion and ear pain  Respiratory: Positive for cough  All other systems reviewed and are negative  Physical Exam  Physical Exam   Constitutional: She is oriented to person, place, and time  No distress  HENT:   Right Ear: Tympanic membrane is injected  Left Ear: Tympanic membrane normal    Mouth/Throat: Oropharynx is clear and moist    Eyes: Pupils are equal, round, and reactive to light  Neck: Normal range of motion  Cardiovascular: Normal rate, regular rhythm and intact distal pulses  Pulmonary/Chest: Effort normal and breath sounds normal  No respiratory distress  Abdominal: Soft  There is no tenderness  Musculoskeletal: Normal range of motion  Neurological: She is alert and oriented to person, place, and time  Skin: Capillary refill takes less than 2 seconds  She is not diaphoretic  Nursing note and vitals reviewed        Vital Signs  ED Triage Vitals [06/14/18 2014]   Temperature Pulse Respirations Blood Pressure SpO2   (!) 100 8 °F (38 2 °C) 94 18 131/59 98 %      Temp Source Heart Rate Source Patient Position - Orthostatic VS BP Location FiO2 (%)   Tympanic Monitor Sitting Right arm --      Pain Score       Worst Possible Pain           Vitals:    06/14/18 2014   BP: 131/59   Pulse: 94   Patient Position - Orthostatic VS: Sitting       Visual Acuity      ED Medications  Medications   ibuprofen (MOTRIN) tablet 600 mg (600 mg Oral Given 6/14/18 2042)   acetaminophen (TYLENOL) tablet 650 mg (650 mg Oral Given 6/14/18 2042)   azithromycin (ZITHROMAX) tablet 500 mg (500 mg Oral Given 6/14/18 2042)       Diagnostic Studies  Results Reviewed     None                 No orders to display              Procedures  Procedures       Phone Contacts  ED Phone Contact    ED Course                               MDM  Number of Diagnoses or Management Options  Bronchitis:   Otitis media:   Diagnosis management comments: Pulse ox 98% on RA indicating adequate oxygenation       Amount and/or Complexity of Data Reviewed  Decide to obtain previous medical records or to obtain history from someone other than the patient: yes  Review and summarize past medical records: yes    Patient Progress  Patient progress: stable    CritCare Time    Disposition  Final diagnoses:   Bronchitis   Otitis media     Time reflects when diagnosis was documented in both MDM as applicable and the Disposition within this note     Time User Action Codes Description Comment    6/14/2018  8:34 PM Rakesh CROFT Add [J40] Bronchitis     6/14/2018  8:34 PM BrynBonnie martino Ettatawer [H66 90] Otitis media       ED Disposition     ED Disposition Condition Comment    Discharge  Brian Nash discharge to home/self care      Condition at discharge: stable        Follow-up Information     Follow up With Specialties Details Why Contact Info Additional Information    Eliana Mccray MD Family Medicine In 3 days  [address]  [facility]       [facility] Emergency Department Emergency Medicine  If symptoms worsen [address] [facility] [address]  [phone] [facility], [place], [place], [address]          Discharge Medication List as of 6/14/2018  8:36 PM      START taking these medications    Details   azithromycin (ZITHROMAX) 250 mg tablet Take 1 tablet (250 mg total) by mouth every 24 hours for 4 days Take 2 tablets today then 1 tablet daily x 4 days, Starting Thu 6/14/2018, Until Mon 6/18/2018, Print      benzonatate (TESSALON PERLES) 100 mg capsule Take 1 capsule (100 mg total) by mouth every 8 (eight) hours for 7 days, Starting Thu 6/14/2018, Until Thu 6/21/2018, Print         CONTINUE these medications which have NOT CHANGED    Details   albuterol (2 5 mg/3 mL) 0 083 % nebulizer solution Take 2 5 mg by nebulization 2 (two) times a day , Until Discontinued, Historical Med      albuterol (PROVENTIL HFA,VENTOLIN HFA) 90 mcg/act inhaler Inhale 2 puffs every 6 (six) hours as needed  , Starting u 1/31/2008, Historical Med      cyclobenzaprine (FLEXERIL) 10 mg tablet Take 1 tablet (10 mg total) by mouth 3 (three) times a day as needed for muscle spasms for up to 4 days, Starting Wed 5/9/2018, Until Sun 5/13/2018, Print      naproxen (NAPROSYN) 500 mg tablet Take 1 tablet (500 mg total) by mouth 2 (two) times a day with meals for 10 days, Starting Wed 5/9/2018, Until Sat 5/19/2018, Print           No discharge procedures on file      ED Provider  Electronically Signed by           Elin Burch,   06/17/18 5679

## 2018-06-23 ENCOUNTER — APPOINTMENT (EMERGENCY)
Dept: RADIOLOGY | Facility: HOSPITAL | Age: 33
End: 2018-06-23
Payer: MEDICARE

## 2018-06-23 ENCOUNTER — HOSPITAL ENCOUNTER (EMERGENCY)
Facility: HOSPITAL | Age: 33
Discharge: HOME | End: 2018-06-23
Attending: EMERGENCY MEDICINE | Admitting: EMERGENCY MEDICINE
Payer: MEDICARE

## 2018-06-23 VITALS
DIASTOLIC BLOOD PRESSURE: 96 MMHG | WEIGHT: 230 LBS | TEMPERATURE: 98.1 F | SYSTOLIC BLOOD PRESSURE: 158 MMHG | BODY MASS INDEX: 36.02 KG/M2 | HEART RATE: 68 BPM | RESPIRATION RATE: 18 BRPM | OXYGEN SATURATION: 98 %

## 2018-06-23 DIAGNOSIS — S89.90XA KNEE INJURY: Primary | ICD-10-CM

## 2018-06-23 PROCEDURE — 73590 X-RAY EXAM OF LOWER LEG: CPT

## 2018-06-23 PROCEDURE — 73564 X-RAY EXAM KNEE 4 OR MORE: CPT

## 2018-06-23 PROCEDURE — 96374 THER/PROPH/DIAG INJ IV PUSH: CPT

## 2018-06-23 PROCEDURE — 99283 EMERGENCY DEPT VISIT LOW MDM: CPT

## 2018-06-23 RX ORDER — KETOROLAC TROMETHAMINE 30 MG/ML
30 INJECTION, SOLUTION INTRAMUSCULAR; INTRAVENOUS ONCE
Status: DISCONTINUED | OUTPATIENT
Start: 2018-06-23 | End: 2018-06-23

## 2018-06-23 RX ORDER — OXYCODONE HYDROCHLORIDE AND ACETAMINOPHEN 5; 325 MG/1; MG/1
1 TABLET ORAL ONCE
Status: COMPLETED | OUTPATIENT
Start: 2018-06-23 | End: 2018-06-23

## 2018-06-23 RX ORDER — NAPROXEN 500 MG/1
500 TABLET ORAL 2 TIMES DAILY WITH MEALS
Qty: 30 TABLET | Refills: 0 | Status: SHIPPED | OUTPATIENT
Start: 2018-06-23 | End: 2018-08-13

## 2018-06-23 RX ORDER — KETOROLAC TROMETHAMINE 30 MG/ML
30 INJECTION, SOLUTION INTRAMUSCULAR; INTRAVENOUS ONCE
Status: COMPLETED | OUTPATIENT
Start: 2018-06-23 | End: 2018-06-23

## 2018-06-23 RX ADMIN — OXYCODONE HYDROCHLORIDE AND ACETAMINOPHEN 1 TABLET: 5; 325 TABLET ORAL at 17:21

## 2018-06-23 RX ADMIN — KETOROLAC TROMETHAMINE 30 MG: 30 INJECTION, SOLUTION INTRAMUSCULAR at 17:20

## 2018-06-23 NOTE — ED TRIAGE NOTES
Patient states she hyperextended her left knee  States that this has happened to this knee before   "It bent backwards and I heard something pop "

## 2018-06-23 NOTE — ED PROVIDER NOTES
History  Chief Complaint   Patient presents with    Knee Injury     pt was going to jump off the diving board in the pool and hurt l leg/knee    Leg Injury     The patient is a 50-year-old female who presents for evaluation left knee injury  The patient states she was just prior to arrival at a swimming pool and was attempting to use the diving board  She hyperextended her left knee in the process of jumping off a diving board  She did swim out of the pool and noticed that she had some laxity and significant swelling of the left knee  She presents for evaluation  She is also complaining of left shin pain  She notes that there is some tingling sensation in her foot  Denies any head injury  Denies loss consciousness  Denies any weakness  Denies any neck pain  Denies any thigh pain  Denies any foot pain  Prior to Admission Medications   Prescriptions Last Dose Informant Patient Reported? Taking? albuterol (2 5 mg/3 mL) 0 083 % nebulizer solution   Yes No   Sig: Take 2 5 mg by nebulization 2 (two) times a day     albuterol (PROVENTIL HFA,VENTOLIN HFA) 90 mcg/act inhaler   Yes No   Sig: Inhale 2 puffs every 6 (six) hours as needed     cyclobenzaprine (FLEXERIL) 10 mg tablet   No No   Sig: Take 1 tablet (10 mg total) by mouth 3 (three) times a day as needed for muscle spasms for up to 4 days   naproxen (NAPROSYN) 500 mg tablet   No No   Sig: Take 1 tablet (500 mg total) by mouth 2 (two) times a day with meals for 10 days   ondansetron (ZOFRAN-ODT) 4 mg disintegrating tablet   No No   Sig: Take 1 tablet (4 mg total) by mouth every 6 (six) hours as needed for nausea or vomiting for up to 15 doses      Facility-Administered Medications: None       Past Medical History:   Diagnosis Date    Asthma     COPD (chronic obstructive pulmonary disease) ([facility] [address] )     Macular degeneration     right eye    Psychiatric disorder     depression, anxiety       Past Surgical History:   Procedure Laterality Date     SECTION      TONSILECTOMY AND ADNOIDECTOMY         Family History   Problem Relation Age of Onset    Macular degeneration Mother      I have reviewed and agree with the history as documented  Social History   Substance Use Topics    Smoking status: Current Every Day Smoker     Packs/day: 0 50     Types: Cigarettes    Smokeless tobacco: Never Used    Alcohol use No      Comment: socially /  never per [employer]        Review of Systems   Constitutional: Negative for activity change, appetite change and fatigue  HENT: Negative for nosebleeds, sneezing, sore throat, trouble swallowing and voice change  Eyes: Negative for photophobia, pain and visual disturbance  Respiratory: Negative for apnea, choking and stridor  Cardiovascular: Negative for palpitations and leg swelling  Gastrointestinal: Negative for anal bleeding and constipation  Endocrine: Negative for cold intolerance, heat intolerance, polydipsia and polyphagia  Genitourinary: Negative for decreased urine volume, enuresis, frequency, genital sores and urgency  Musculoskeletal: Negative for joint swelling and myalgias  Allergic/Immunologic: Negative for environmental allergies and food allergies  Neurological: Negative for tremors, seizures, speech difficulty and weakness  Hematological: Negative for adenopathy  Psychiatric/Behavioral: Negative for behavioral problems, decreased concentration, dysphoric mood and hallucinations  Physical Exam  Physical Exam   Constitutional: She is oriented to person, place, and time  She appears well-developed and well-nourished  No distress  HENT:   Head: Normocephalic and atraumatic  Right Ear: External ear normal    Left Ear: External ear normal    Nose: Nose normal    Mouth/Throat: Oropharynx is clear and moist    Eyes: Conjunctivae and EOM are normal  Pupils are equal, round, and reactive to light  Neck: Normal range of motion  Neck supple     Cardiovascular: Normal rate, regular rhythm and normal heart sounds  Exam reveals no gallop and no friction rub  No murmur heard  Pulmonary/Chest: Effort normal and breath sounds normal  No respiratory distress  She has no wheezes  Abdominal: Soft  Bowel sounds are normal    Musculoskeletal: She exhibits tenderness  She exhibits no deformity  Left knee: She exhibits decreased range of motion, swelling, effusion, ecchymosis and bony tenderness  She exhibits no deformity, no laceration and no erythema  Tenderness found  Neurological: She is alert and oriented to person, place, and time  No cranial nerve deficit  Skin: Skin is warm and dry  She is not diaphoretic  Psychiatric: She has a normal mood and affect  Her behavior is normal    Vitals reviewed  Vital Signs  ED Triage Vitals   Temperature Pulse Respirations Blood Pressure SpO2   06/23/18 1658 06/23/18 1658 06/23/18 1658 06/23/18 1658 06/23/18 1658   98 1 °F (36 7 °C) 68 18 158/96 98 %      Temp Source Heart Rate Source Patient Position - Orthostatic VS BP Location FiO2 (%)   06/23/18 1658 06/23/18 1658 06/23/18 1658 06/23/18 1658 --   Tympanic Monitor Lying Left arm       Pain Score       06/23/18 1721       Worst Possible Pain           Vitals:    06/23/18 1658   BP: 158/96   Pulse: 68   Patient Position - Orthostatic VS: Lying       Visual Acuity      ED Medications  Medications   oxyCODONE-acetaminophen (PERCOCET) 5-325 mg per tablet 1 tablet (1 tablet Oral Given 6/23/18 1721)   ketorolac (TORADOL) injection 30 mg (30 mg Intravenous Given 6/23/18 1720)       Diagnostic Studies  Results Reviewed     None                 XR tibia fibula 2 views LEFT   Final Result by Nilo Dias MD (06/23 1759)      No acute left tibia or fibula fracture  Workstation performed: [id number]         XR knee 4+ vw left injury   Final Result by Nilo Dias MD (06/23 1758)      No acute left knee fracture              Workstation performed: [id number] Procedures  Procedures       Phone Contacts  ED Phone Contact    ED Course                               MDM  CritCare Time    Disposition  Final diagnoses:   Knee injury     Time reflects when diagnosis was documented in both MDM as applicable and the Disposition within this note     Time User Action Codes Description Comment    6/23/2018  6:13 PM Fadi Mello Knee injury       ED Disposition     ED Disposition Condition Comment    Discharge  Brian Nash discharge to home/self care  Condition at discharge: Stable        Follow-up Information     Follow up With Specialties Details Why Contact Info    Tay Lucas MD Orthopedic Surgery Schedule an appointment as soon as possible for a visit  [address] [place] [address]  [phone]            Patient's Medications   Discharge Prescriptions    NAPROXEN (EC NAPROSYN) 500 MG EC TABLET    Take 1 tablet (500 mg total) by mouth 2 (two) times a day with meals for 15 days       Start Date: 6/23/2018 End Date: 7/8/2018       Order Dose: 500 mg       Quantity: 30 tablet    Refills: 0     No discharge procedures on file      ED Provider  Electronically Signed by           Regla Kearns PA-C  06/23/18 2201 [address] [place] [address] EVY FOWLER  06/23/18 1270

## 2018-06-23 NOTE — ED NOTES
Discharge instructions reviewed with patient and family  Patient and family state understanding  Discharged to home       Gracie Palafox RN  06/23/18 0449

## 2018-06-23 NOTE — DISCHARGE INSTRUCTIONS
Knee Sprain   WHAT YOU NEED TO KNOW:   What is a knee sprain? A knee sprain occurs when one or more ligaments in your knee are suddenly stretched or torn  Ligaments are tissues that hold bones together  Ligaments support the knee and keep the joint and bones in the correct position  What causes a knee sprain? · A sudden twisting of the knee joint  may cause a knee sprain  This may happen when you run, jump and land, or stop or change direction suddenly  Activities that cause your knee to extend more than normal can also cause a sprain  Sprains commonly occur in sports such as football, basketball, hockey, and skiing  · Direct hits to the knee  may cause a sprain  Sprains may be caused by hits to the front, sides, or back of the knees  Sprains may be caused by falling onto your knees while they are bent  A sprain may also happen during a car accident  What increases my risk for a knee sprain? · Lack of the correct shoes or protective gear     · No warm up or stretching before exercise    · Excessive exercise or a sudden increase in exercise     · A previous sprain  What are the signs and symptoms of a knee sprain? · Stiffness or decreased movement     · Pain or tenderness     · Painful pop that you can hear or feel    · Swelling or bruising    · Knee that mk or gives out when you try to walk  How is a knee sprain diagnosed? Your healthcare provider will ask you about your injury and symptoms, and examine you  Tell him or her if you heard a snap or pop when you were injured  Your healthcare provider will check the movement and strength of your knee joint  An x-ray, CT scan or MRI may show the sprain or other damage to your knee  You may be given contrast liquid to help your injury show up better in the pictures  Tell the healthcare provider if you have ever had an allergic reaction to contrast liquid  Do not enter the MRI room with anything metal  Metal can cause serious injury   Tell the healthcare provider if you have any metal in or on your body  How is a knee sprain treated? Treatment depends on the type and cause of your knee sprain  You may need any of the following:  · NSAIDs , such as ibuprofen, help decrease swelling, pain, and fever  This medicine is available with or without a doctor's order  NSAIDs can cause stomach bleeding or kidney problems in certain people  If you take blood thinner medicine, always ask your healthcare provider if NSAIDs are safe for you  Always read the medicine label and follow directions  · Acetaminophen  decreases pain and fever  It is available without a doctor's order  Ask how much to take and how often to take it  Follow directions  Read the labels of all other medicines you are using to see if they also contain acetaminophen, or ask your doctor or pharmacist  Acetaminophen can cause liver damage if not taken correctly  Do not use more than 4 grams (4,000 milligrams) total of acetaminophen in one day  · Prescription pain medicine  may be given  Ask how to take this medicine safely  · Support devices  such as a splint or brace may be needed  These devices limit movement and protect your joint while it heals  You may be given crutches to use until you can stand on your injured leg without pain  Use devices as directed  · Physical therapy  may be needed  A physical therapist teaches you exercises to help improve movement and strength, and to decrease pain  · Surgery  may be needed if other treatments do not work or your strain is severe  Surgery may include a knee arthroscopy to look inside your knee joint and repair damage  How can I manage my knee sprain? · Rest  your knee and do not exercise  You may be told to keep weight off your knee  This means that you should not walk on your injured leg  Rest helps decrease swelling and allows the injury to heal  You can do gentle range of motion (ROM) exercises as directed   This will prevent stiffness  · Apply ice  on your knee for 15 to 20 minutes every hour or as directed  Use an ice pack, or put crushed ice in a plastic bag  Cover it with a towel  Ice helps prevent tissue damage and decreases swelling and pain  · Apply compression to your knee as directed  You may need to wear an elastic bandage  This helps keep your injured knee from moving too much while it heals  You can loosen or tighten the elastic bandage to make it comfortable  It should be tight enough for you to feel support  It should not be so tight that it causes your toes to feel numb or tingly  If you are wearing an elastic bandage, take it off and rewrap it once a day  · Elevate your knee  above the level of your heart as often as you can  This will help decrease swelling and pain  Prop your leg on pillows or blankets to keep it elevated comfortably  Do not put pillows directly behind your knee  How can I prevent another knee sprain? Exercise your legs to keep your muscles strong  Strong leg muscles help protect your knee and prevent strain  The following may also prevent a knee sprain:  · Slowly start your exercise or training program   Slowly increase the time, distance, and intensity of your exercise  Sudden increases in training may cause you to injure your knee again  · Wear protective braces and equipment as directed  Braces may prevent your knee from moving the wrong way and causing another sprain  Protective equipment may support your bones and ligaments to prevent injury  · Warm up and stretch before exercise  Warm up by walking or using an exercise bike before starting your regular exercise  Do gentle stretches after warming up  This helps to loosen your muscles and decrease stress on your knee  Cool down and stretch after you exercise  · Wear shoes that fit correctly and support your feet  Replace your running or exercise shoes before the padding or shock absorption is worn out   Ask your healthcare provider which exercise shoes are best for you  Ask if you should wear special shoe inserts  Shoe inserts can help support your heels and arches or keep your foot lined up correctly in your shoes  Exercise on flat surfaces  When should I seek care immediately? · Any part of your leg feels cold, numb, or looks pale     When should I contact my healthcare provider? · You have new or increased swelling, bruising, or pain in your knee  · Your symptoms do not improve within 6 weeks, even with treatment  · You have questions or concerns about your condition or care  CARE AGREEMENT:   You have the right to help plan your care  Learn about your health condition and how it may be treated  Discuss treatment options with your caregivers to decide what care you want to receive  You always have the right to refuse treatment  The above information is an  only  It is not intended as medical advice for individual conditions or treatments  Talk to your doctor, nurse or pharmacist before following any medical regimen to see if it is safe and effective for you  © 2017 [employer]  Information is for End User's use only and may not be sold, redistributed or otherwise used for commercial purposes  All illustrations and images included in CareNotes® are the copyrighted property of [employer]  or [employer]

## 2018-06-26 ENCOUNTER — OFFICE VISIT (OUTPATIENT)
Dept: OBGYN CLINIC | Facility: CLINIC | Age: 33
End: 2018-06-26
Payer: MEDICARE

## 2018-06-26 VITALS
HEIGHT: 67 IN | WEIGHT: 236.6 LBS | BODY MASS INDEX: 37.13 KG/M2 | HEART RATE: 68 BPM | DIASTOLIC BLOOD PRESSURE: 93 MMHG | SYSTOLIC BLOOD PRESSURE: 140 MMHG

## 2018-06-26 DIAGNOSIS — S80.02XA TRAUMATIC ECCHYMOSIS OF LEFT KNEE, INITIAL ENCOUNTER: ICD-10-CM

## 2018-06-26 DIAGNOSIS — M25.562 ACUTE PAIN OF LEFT KNEE: Primary | ICD-10-CM

## 2018-06-26 PROCEDURE — 99204 OFFICE O/P NEW MOD 45 MIN: CPT | Performed by: ORTHOPAEDIC SURGERY

## 2018-06-26 NOTE — PROGRESS NOTES
Assessment/Plan:  1  Acute pain of left knee     2  Traumatic ecchymosis of left knee, initial encounter       Wilma is a pleasant in 34year old female presenting 3 days after an acute left knee hyperextension injury  Unfortunately, due to the significant pain and swelling, our exam was significantly limited  There is no concern for fracture, however she could have ligamentous or tendinous damage of the posterolateral corner  We would like her to remain in a knee immobilizer with crutches for another week  She should continue with the naprosyn to help the edema, but should add in extra strength tylenol as well to help with pain  She may bear weight in the knee immobilizer as tolerated  We would like her to return in 1 week for a clinical reevaluation, at which time we anticipate the edema, ecchymosis, and pain to have at least partially resolved  She will not need new imaging at that time  All of her questions were answered today  Subjective: Left knee and leg pain    Patient ID: Luetta Homans is a 34 y o  female  Wilma is a pleasant 34year old female presenting for evaluation of her left knee  She was in her usual state of health three days ago, when she hyperextended her left knee attempting to jump off of a diving board  She felt immediate pain and was brought to the ED by ambulance, where Xrays did not show acute fracture  She was given naproxen and presents today for follow up  She has severe posterior knee pain radiating toward the left ankle with movement or any attempts at weight bearing  She has swelling and bruising of the leg as well  She reports tingling at the areas of ecchymosis but not in the foot  She injured the same knee in a hyperextension injury 20 years ago and reports that she was treated in a cast for 8 weeks and did not have any residual problems  She is on disability for macular degeneration  Review of Systems   Constitutional: Negative  HENT: Negative      Eyes: Positive for visual disturbance  Respiratory: Positive for cough and shortness of breath  Cardiovascular: Positive for leg swelling  Gastrointestinal: Negative  Endocrine: Negative  Genitourinary: Negative  Musculoskeletal: Positive for arthralgias, joint swelling and myalgias  Skin: Negative  Allergic/Immunologic: Negative  Neurological: Negative  Hematological: Negative  Psychiatric/Behavioral: The patient is nervous/anxious  Past Medical History:   Diagnosis Date    Asthma     [facility]ch[facility]     Macular degeneration     right eye    Psychiatric disorder     depression, anxiety       Past Surgical History:   Procedure Laterality Date     SECTION      TONSILECTOMY AND ADNOIDECTOMY         Family History   Problem Relation Age of Onset    Macular degeneration Mother        Social History     Occupational History    Not on file       Social History Main Topics    Smoking status: Current Every Day Smoker     Packs/day: 0 50     Types: Cigarettes    Smokeless tobacco: Never Used    Alcohol use No      Comment: socially /  never per [employer]    Drug use: No    Sexual activity: Not on file         Current Outpatient Prescriptions:     albuterol (2 5 mg/3 mL) 0 083 % nebulizer solution, Take 2 5 mg by nebulization 2 (two) times a day , Disp: , Rfl:     albuterol (PROVENTIL HFA,VENTOLIN HFA) 90 mcg/act inhaler, Inhale 2 puffs every 6 (six) hours as needed  , Disp: , Rfl:     naproxen (EC NAPROSYN) 500 MG EC tablet, Take 1 tablet (500 mg total) by mouth 2 (two) times a day with meals for 15 days, Disp: 30 tablet, Rfl: 0    ondansetron (ZOFRAN-ODT) 4 mg disintegrating tablet, Take 1 tablet (4 mg total) by mouth every 6 (six) hours as needed for nausea or vomiting for up to 15 doses, Disp: 15 tablet, Rfl: 0    Allergies   Allergen Reactions    Penicillins Hives     Reaction Date: ;        Objective:  Vitals: 06/26/18 1033   BP: 140/93   Pulse: 68       Body mass index is 37 06 kg/m²  Left Knee Exam     Tenderness   The patient is experiencing tenderness in the medial hamstring (posterior knee, ecchymotic areas, calf, and left ankle)  Range of Motion   Extension:  5 abnormal   Flexion:  50 (ROM limited by pain and swelling) abnormal     Other   Scars: absent  Sensation: decreased  Pulse: present  Swelling: moderate  Effusion: effusion present    Comments:  Significant posterior eccymosis extending distally to ankle  Moderate, non-pitting edema below knee  Compartments soft, but tender  Reports decreased sensation over ecchymotic areas of posterior knee and calf extending down to lateral left foot (distribution of peroneal nerve)  Negative Stoner's test  Tender Achilles, but tendon intact          Observations   Left Knee   Positive for effusion  Physical Exam   Constitutional: She is oriented to person, place, and time  She appears well-developed and well-nourished  Body mass index is 37 06 kg/m²  HENT:   Head: Normocephalic and atraumatic  Eyes: EOM are normal    Neck: Normal range of motion  Cardiovascular: Intact distal pulses  Pulmonary/Chest: Effort normal    Musculoskeletal:        Left knee: She exhibits effusion  See ortho exam   Neurological: She is alert and oriented to person, place, and time  Skin: Skin is warm and dry  Psychiatric: Her behavior is normal  Judgment and thought content normal    Anxious       I have personally reviewed pertinent films in PACS of the nonweightbearing X-rays of her left knee which are negative for fracture, dislocation, or evidence of degenerative changes

## 2018-06-28 ENCOUNTER — VBI (OUTPATIENT)
Dept: FAMILY MEDICINE CLINIC | Facility: CLINIC | Age: 33
End: 2018-06-28

## 2018-06-28 NOTE — TELEPHONE ENCOUNTER
Pt was seen in [address] on 6/14 18  CC; Fever Dx; Bronchitis; Otitis media   Left message, informed Pt of  on call, office hours and phone number

## 2018-06-29 ENCOUNTER — VBI (OUTPATIENT)
Dept: FAMILY MEDICINE CLINIC | Facility: CLINIC | Age: 33
End: 2018-06-29

## 2018-06-29 NOTE — TELEPHONE ENCOUNTER
Pt was seen in [address] on 6/23/18  CC: Knee Injury; Leg Injury; DX: Knee injury  Pt is ahsan @ [facility] 7/3/18

## 2018-07-03 ENCOUNTER — OFFICE VISIT (OUTPATIENT)
Dept: OBGYN CLINIC | Facility: CLINIC | Age: 33
End: 2018-07-03
Payer: MEDICARE

## 2018-07-03 VITALS
SYSTOLIC BLOOD PRESSURE: 139 MMHG | WEIGHT: 236.6 LBS | HEART RATE: 96 BPM | HEIGHT: 67 IN | BODY MASS INDEX: 37.13 KG/M2 | DIASTOLIC BLOOD PRESSURE: 83 MMHG

## 2018-07-03 DIAGNOSIS — M25.562 ACUTE PAIN OF LEFT KNEE: Primary | ICD-10-CM

## 2018-07-03 DIAGNOSIS — S86.112D GASTROCNEMIUS TEAR, LEFT, SUBSEQUENT ENCOUNTER: ICD-10-CM

## 2018-07-03 DIAGNOSIS — S80.12XD TRAUMATIC ECCHYMOSIS OF LEFT LOWER LEG, SUBSEQUENT ENCOUNTER: ICD-10-CM

## 2018-07-03 DIAGNOSIS — M25.462 EFFUSION OF LEFT KNEE: ICD-10-CM

## 2018-07-03 PROCEDURE — 99213 OFFICE O/P EST LOW 20 MIN: CPT | Performed by: ORTHOPAEDIC SURGERY

## 2018-07-03 NOTE — PROGRESS NOTES
Assessment/Plan:  1  Acute pain of left knee  MRI knee left  wo contrast   2  Gastrocnemius tear, left, subsequent encounter     3  Traumatic ecchymosis of left lower leg, subsequent encounter  MRI knee left  wo contrast   4  Effusion of left knee  MRI knee left  wo contrast     Corinne is a very pleasant 79-year-old female presenting now 10 days after sustaining a left knee hyperextension injury on the diving board  She has seen significant improvement over the past week in her overall function and pain  The gross ecchymosis of the posterior knee extending down to the foot is likely from a gastrocnemius muscle tear  This is likely responsible for the edema in her left lower extremity as well  However, her exam today did exhibit persistent effusion of the left knee with laxity of the ACL Lachman's and anterior drawer testing  Given the traumatic nature of her injury, we have concern for a possible ACL disruption  Therefore, we would like her to undergo an MRI of her left knee without contrast to determine the integrity of her ACL, menisci, and collateral ligaments  In the interim, she may discontinue the knee immobilizer and transition into the hinged knee brace that was provided for her here today  She can continue to work on range of motion of the left knee  She may continue with Naprosyn as needed for pain and swelling  We will call with results of her left knee MRI to further delineate the plan of care  All of her questions were answered today in the office  Subjective: Left leg follow up    Patient ID: Uriel Zamora is a 34 y o  female  Wilma is a pleasant 79-year-old female presenting for follow-up 1 week from her initial appointment and now 10 days since a hyperextension injury to her left knee  She reports that she has been compliant with the use of a knee immobilizer while ambulating  She has tried ambulate without crutches    She is focused on trying to regain range of motion, which she has done in the pool  She reports significant improvement in her overall comfort and function  She has not fully tested the knee with weight-bearing while not wearing a knee immobilizer  She does have significant ecchymosis and edema of the left calf  She denies any new injuries or symptoms  Review of Systems   Constitutional: Negative  HENT: Negative  Eyes: Negative  Respiratory: Negative  Cardiovascular: Positive for leg swelling  Gastrointestinal: Negative  Endocrine: Negative  Genitourinary: Negative  Musculoskeletal: Positive for arthralgias, joint swelling and myalgias  Skin: Negative  Allergic/Immunologic: Negative  Neurological: Negative  Hematological: Negative  Psychiatric/Behavioral: The patient is nervous/anxious  Past Medical History:   Diagnosis Date    Asthma     [facility])     Macular degeneration     right eye    Psychiatric disorder     depression, anxiety       Past Surgical History:   Procedure Laterality Date     SECTION      TONSILECTOMY AND ADNOIDECTOMY         Family History   Problem Relation Age of Onset    Macular degeneration Mother        Social History     Occupational History    Not on file       Social History Main Topics    Smoking status: Current Every Day Smoker     Packs/day: 0 50     Types: Cigarettes    Smokeless tobacco: Never Used    Alcohol use No      Comment: socially /  never per [employer]    Drug use: No    Sexual activity: Not on file         Current Outpatient Prescriptions:     albuterol (2 5 mg/3 mL) 0 083 % nebulizer solution, Take 2 5 mg by nebulization 2 (two) times a day , Disp: , Rfl:     albuterol (PROVENTIL HFA,VENTOLIN HFA) 90 mcg/act inhaler, Inhale 2 puffs every 6 (six) hours as needed  , Disp: , Rfl:     naproxen (EC NAPROSYN) 500 MG EC tablet, Take 1 tablet (500 mg total) by mouth 2 (two) times a day with meals for 15 days, Disp: 30 tablet, Rfl: 0    ondansetron (ZOFRAN-ODT) 4 mg disintegrating tablet, Take 1 tablet (4 mg total) by mouth every 6 (six) hours as needed for nausea or vomiting for up to 15 doses, Disp: 15 tablet, Rfl: 0    Allergies   Allergen Reactions    Penicillins Hives     Reaction Date: [id number];        Objective:  Vitals:    07/03/18 1125   BP: 139/83   Pulse: 96       Body mass index is 37 06 kg/m²  Left Knee Exam     Tenderness   The patient is experiencing tenderness in the medial hamstring (posterior knee, ecchymotic areas, calf, and left ankle; less tender than last week)  Range of Motion   Extension:  0 normal   Flexion:  90 (ROM limited by pain and swelling) abnormal     Tests   Matthew:  Medial - negative Lateral - negative  Lachman:  Anterior - 1+      Drawer:       Anterior - 1+     Posterior - negative  Varus: negative  Valgus: negative  Patellar Apprehension: negative    Other   Erythema: absent  Scars: absent  Sensation: decreased  Pulse: present  Swelling: mild  Effusion: effusion present    Comments:  Significant posterior eccymosis extending distally to ankle  Moderate, non-pitting edema below knee to foot  Compartments soft, but tender; significantly less tender than last week  Reports decreased sensation over ecchymotic areas of popliteal fossa, but otherwise normal sensation to light touch in the L2 through S1 nerve distributions  Negative Stoner's test  Tender Achilles, but tendon intact          Observations   Left Knee   Positive for effusion  Physical Exam   Constitutional: She is oriented to person, place, and time  She appears well-developed and well-nourished  Body mass index is 37 06 kg/m²  HENT:   Head: Normocephalic and atraumatic  Eyes: EOM are normal    Neck: Normal range of motion  Cardiovascular: Intact distal pulses  Pulmonary/Chest: Effort normal    Musculoskeletal:        Left knee: She exhibits effusion     See ortho exam   Neurological: She is alert and oriented to person, place, and time  Skin: Skin is warm and dry     Psychiatric: Her behavior is normal  Judgment and thought content normal

## 2018-07-13 ENCOUNTER — HOSPITAL ENCOUNTER (OUTPATIENT)
Dept: RADIOLOGY | Facility: HOSPITAL | Age: 33
Discharge: HOME | End: 2018-07-13
Payer: MEDICARE

## 2018-07-13 DIAGNOSIS — S80.12XD TRAUMATIC ECCHYMOSIS OF LEFT LOWER LEG, SUBSEQUENT ENCOUNTER: ICD-10-CM

## 2018-07-13 DIAGNOSIS — M25.562 ACUTE PAIN OF LEFT KNEE: ICD-10-CM

## 2018-07-13 DIAGNOSIS — M25.462 EFFUSION OF LEFT KNEE: ICD-10-CM

## 2018-07-13 PROCEDURE — 73721 MRI JNT OF LWR EXTRE W/O DYE: CPT

## 2018-07-16 DIAGNOSIS — S86.112D GASTROCNEMIUS TEAR, LEFT, SUBSEQUENT ENCOUNTER: ICD-10-CM

## 2018-07-16 DIAGNOSIS — S80.12XA TRAUMATIC HEMATOMA OF LEFT LOWER LEG, INITIAL ENCOUNTER: Primary | ICD-10-CM

## 2018-07-16 NOTE — PROGRESS NOTES
Spoke with patient regarding her left knee MRI results  There is no structural damage to the collateral or cruciate ligaments or menisci  She does have bone bruises of the lateral tibial plateau and lateral femoral condyle  She is still using the hinged knee brace while ambulating and the crutches when walking more than around the house  She feels her range of motion has improved  Her main complaint is of posterior calf pain and persistent swelling  Accordingly, her MRI shows a large fluid collection in the posterior calf that is only partially visualized at the proximal end  She complains of pressure and a numbness in her posterior calf radiating to her ankle, but not the foot  This is consistent with the MRI findings  We will refer her to Interventional Radiology for consideration of a fluoroscopic or ultrasound guided aspiration  We will see her back 1-2 weeks after this is performed for a reevaluation  All of her questions were answered

## 2018-07-16 NOTE — Clinical Note
Please help set Wilma up with an IR evaluation for aspiration of the fluid collection in her left calf  We will see her back 1-2 weeks after it's completed  Thanks!

## 2018-07-19 ENCOUNTER — HOSPITAL ENCOUNTER (OUTPATIENT)
Dept: RADIOLOGY | Facility: HOSPITAL | Age: 33
Discharge: HOME | End: 2018-07-19
Admitting: RADIOLOGY
Payer: MEDICARE

## 2018-07-19 DIAGNOSIS — S80.12XA HEMATOMA OF LEFT LOWER EXTREMITY, INITIAL ENCOUNTER: ICD-10-CM

## 2018-07-19 PROCEDURE — 10030 IMG GID FLU COLL DRG SFT TIS: CPT

## 2018-07-19 RX ORDER — LIDOCAINE HYDROCHLORIDE 10 MG/ML
INJECTION, SOLUTION INFILTRATION; PERINEURAL CODE/TRAUMA/SEDATION MEDICATION
Status: COMPLETED | OUTPATIENT
Start: 2018-07-19 | End: 2018-07-19

## 2018-07-19 RX ADMIN — LIDOCAINE HYDROCHLORIDE 4 ML: 10 INJECTION, SOLUTION INFILTRATION; PERINEURAL at 14:50

## 2018-07-19 NOTE — SEDATION DOCUMENTATION
Pt here for lle  Asp  Of hematoma  Left leg edematous  Pt  C/o numbness and tingling from knee to ankle on LLE

## 2018-08-13 ENCOUNTER — HOSPITAL ENCOUNTER (EMERGENCY)
Facility: HOSPITAL | Age: 33
Discharge: HOME | End: 2018-08-13
Attending: EMERGENCY MEDICINE | Admitting: EMERGENCY MEDICINE
Payer: MEDICARE

## 2018-08-13 ENCOUNTER — APPOINTMENT (EMERGENCY)
Dept: RADIOLOGY | Facility: HOSPITAL | Age: 33
End: 2018-08-13
Payer: MEDICARE

## 2018-08-13 VITALS
TEMPERATURE: 98.2 F | DIASTOLIC BLOOD PRESSURE: 86 MMHG | HEART RATE: 63 BPM | SYSTOLIC BLOOD PRESSURE: 156 MMHG | RESPIRATION RATE: 18 BRPM | BODY MASS INDEX: 36.02 KG/M2 | WEIGHT: 230 LBS | OXYGEN SATURATION: 99 %

## 2018-08-13 DIAGNOSIS — S93.401A RIGHT ANKLE SPRAIN: Primary | ICD-10-CM

## 2018-08-13 LAB — EXT PREG TEST URINE: NORMAL

## 2018-08-13 PROCEDURE — 73610 X-RAY EXAM OF ANKLE: CPT

## 2018-08-13 PROCEDURE — 81025 URINE PREGNANCY TEST: CPT | Performed by: EMERGENCY MEDICINE

## 2018-08-13 PROCEDURE — 99284 EMERGENCY DEPT VISIT MOD MDM: CPT

## 2018-08-13 RX ORDER — NAPROXEN 500 MG/1
500 TABLET ORAL ONCE
Status: COMPLETED | OUTPATIENT
Start: 2018-08-13 | End: 2018-08-13

## 2018-08-13 RX ORDER — OXYCODONE HYDROCHLORIDE AND ACETAMINOPHEN 5; 325 MG/1; MG/1
1 TABLET ORAL EVERY 6 HOURS PRN
Qty: 10 TABLET | Refills: 0 | Status: SHIPPED | OUTPATIENT
Start: 2018-08-13 | End: 2018-08-16

## 2018-08-13 RX ORDER — OXYCODONE HYDROCHLORIDE AND ACETAMINOPHEN 5; 325 MG/1; MG/1
1 TABLET ORAL ONCE
Status: COMPLETED | OUTPATIENT
Start: 2018-08-13 | End: 2018-08-13

## 2018-08-13 RX ORDER — NAPROXEN 500 MG/1
500 TABLET ORAL 2 TIMES DAILY WITH MEALS
Qty: 10 TABLET | Refills: 0 | Status: SHIPPED | OUTPATIENT
Start: 2018-08-13 | End: 2019-04-30

## 2018-08-13 RX ADMIN — NAPROXEN 500 MG: 500 TABLET ORAL at 20:42

## 2018-08-13 RX ADMIN — OXYCODONE HYDROCHLORIDE AND ACETAMINOPHEN 1 TABLET: 5; 325 TABLET ORAL at 20:42

## 2018-08-14 NOTE — ED PROVIDER NOTES
History  Chief Complaint   Patient presents with    Ankle Injury     States she keeps on twisting her right ankle  States today she went to turn from the stove and her ankle went out and she fell  Pain right " entire ankle"     TWISTED RT ANKLE, C/O PAIN  RT ANKLE LAT SWELLING  History provided by:  Patient and friend  Ankle Injury   Location:  RT ANKLE  Severity:  Moderate  Onset quality:  Sudden  Duration:  1 day  Timing:  Constant  Progression:  Worsening  Chronicity:  New  Associated symptoms: no rash        Prior to Admission Medications   Prescriptions Last Dose Informant Patient Reported? Taking? albuterol (2 5 mg/3 mL) 0 083 % nebulizer solution 2018 at Unknown time  Yes Yes   Sig: Take 2 5 mg by nebulization 2 (two) times a day  albuterol (PROVENTIL HFA,VENTOLIN HFA) 90 mcg/act inhaler More than a month at Unknown time  Yes No   Sig: Inhale 2 puffs every 6 (six) hours as needed        Facility-Administered Medications: None       Past Medical History:   Diagnosis Date    Asthma     [facility]ch[facility]) ([facility]     Macular degeneration     right eye    Psychiatric disorder     depression, anxiety       Past Surgical History:   Procedure Laterality Date     SECTION      TONSILECTOMY AND ADNOIDECTOMY      TUBAL LIGATION  2012       Family History   Problem Relation Age of Onset    Macular degeneration Mother      I have reviewed and agree with the history as documented  Social History   Substance Use Topics    Smoking status: Current Every Day Smoker     Packs/day: 0 50     Types: Cigarettes    Smokeless tobacco: Never Used    Alcohol use No      Comment: socially /  never per [employer]        Review of Systems   Constitutional: Negative  Musculoskeletal: Positive for arthralgias, gait problem and joint swelling  Skin: Negative  Negative for rash and wound         Physical Exam  Physical Exam   Constitutional: She is oriented to person, place, and time  She appears well-developed and well-nourished  No distress  Musculoskeletal: She exhibits tenderness  Right ankle: She exhibits decreased range of motion and swelling  She exhibits normal pulse  Achilles tendon exhibits no pain and no defect  Neurological: She is alert and oriented to person, place, and time  She exhibits normal muscle tone  Skin: Skin is warm and dry  She is not diaphoretic  Nursing note and vitals reviewed        Vital Signs  ED Triage Vitals [08/13/18 2025]   Temperature Pulse Respirations Blood Pressure SpO2   98 2 °F (36 8 °C) 63 18 156/86 99 %      Temp Source Heart Rate Source Patient Position - Orthostatic VS BP Location FiO2 (%)   Oral Monitor Sitting Right arm --      Pain Score       Worst Possible Pain           Vitals:    08/13/18 2025   BP: 156/86   Pulse: 63   Patient Position - Orthostatic VS: Sitting       Visual Acuity      ED Medications  Medications   naproxen (NAPROSYN) tablet 500 mg (500 mg Oral Given 8/13/18 2042)   oxyCODONE-acetaminophen (PERCOCET) 5-325 mg per tablet 1 tablet (1 tablet Oral Given 8/13/18 2042)       Diagnostic Studies  Results Reviewed     Procedure Component Value Units Date/Time    POCT pregnancy, urine [[id number]]  (Normal) Resulted:  08/13/18 2047    Lab Status:  Final result Updated:  08/13/18 2047     EXT PREG TEST UR (Ref: Negative) neg                 XR ankle 3+ views RIGHT   ED Interpretation by Musa Chase MD (08/13 2102)   STS, NO ACUTE Fx                 Procedures  Orthopedic Injury  Date/Time: 8/13/2018 9:03 PM  Performed by: Karen Rdz  Authorized by: Karen Rdz     Patient Location:  ED  Other Assisting Provider: Yes (comment)    Verbal consent obtained?: Yes    Risks and benefits: Risks, benefits and alternatives were discussed    Consent given by:  Patient  Patient states understanding of procedure being performed: Yes    Patient's understanding of procedure matches consent: Yes    Radiology Images displayed and confirmed  If images not available, report reviewed: Yes    Patient identity confirmed:  Verbally with patient  Injury location:  Ankle  Location details:  Right ankle  Injury type: Soft tissue  Distal perfusion: normal    Neurological function: normal    Range of motion: reduced    Immobilization:  Splint  Splint type:  Short leg  Supplies used:  Cotton padding, Ortho-Glass and elastic bandage  Neurovascular status: Neurovascularly intact    Distal perfusion: normal    Neurological function: normal    Range of motion: normal    Patient tolerance:  Patient tolerated the procedure well with no immediate complications           Phone Contacts  ED Phone Contact    ED Course                               MDM  CritCare Time    Disposition  Final diagnoses:   Right ankle sprain     Time reflects when diagnosis was documented in both MDM as applicable and the Disposition within this note     Time User Action Codes Description Comment    8/13/2018  9:04 PM Kelechi Valiente Add [S91 401A] Right ankle sprain       ED Disposition     ED Disposition Condition Comment    Discharge  Wilma Weiss discharge to home/self care      Condition at discharge: Stable        Follow-up Information     Follow up With Specialties Details Why [facility] 71 , DO Orthopedic Surgery Schedule an appointment as soon as possible for a visit in 3 days  [address]  [phone]            Patient's Medications   Discharge Prescriptions    NAPROXEN (NAPROSYN) 500 MG TABLET    Take 1 tablet (500 mg total) by mouth 2 (two) times a day with meals for 5 days       Start Date: 8/13/2018 End Date: 8/18/2018       Order Dose: 500 mg       Quantity: 10 tablet    Refills: 0    OXYCODONE-ACETAMINOPHEN (PERCOCET) 5-325 MG PER TABLET    Take 1 tablet by mouth every 6 (six) hours as needed for moderate pain for up to 3 days Max Daily Amount: 4 tablets       Start Date: 8/13/2018 End Date: 8/16/2018       Order Dose: 1 tablet       Quantity: 10 tablet    Refills: 0     No discharge procedures on file      ED Provider  Electronically Signed by           Musa Chase MD  08/13/18 3712

## 2018-08-14 NOTE — DISCHARGE INSTRUCTIONS

## 2018-08-14 NOTE — ED NOTES
VINAYAK ROE placed splint onto RT lower leg  Patient with splint in place to RT leg,already has crutches from previous visit       Sharad Nugent RN  08/13/18 8614

## 2019-04-30 ENCOUNTER — APPOINTMENT (EMERGENCY)
Dept: RADIOLOGY | Facility: HOSPITAL | Age: 34
End: 2019-04-30
Payer: MEDICARE

## 2019-04-30 ENCOUNTER — HOSPITAL ENCOUNTER (EMERGENCY)
Facility: HOSPITAL | Age: 34
Discharge: HOME | End: 2019-04-30
Attending: EMERGENCY MEDICINE | Admitting: EMERGENCY MEDICINE
Payer: MEDICARE

## 2019-04-30 VITALS
BODY MASS INDEX: 37.43 KG/M2 | TEMPERATURE: 99.9 F | OXYGEN SATURATION: 99 % | SYSTOLIC BLOOD PRESSURE: 146 MMHG | WEIGHT: 239 LBS | DIASTOLIC BLOOD PRESSURE: 73 MMHG | HEART RATE: 61 BPM | RESPIRATION RATE: 18 BRPM

## 2019-04-30 DIAGNOSIS — S69.91XA INJURY OF RIGHT THUMB, INITIAL ENCOUNTER: Primary | ICD-10-CM

## 2019-04-30 PROCEDURE — 73140 X-RAY EXAM OF FINGER(S): CPT

## 2019-04-30 PROCEDURE — 99283 EMERGENCY DEPT VISIT LOW MDM: CPT

## 2019-10-21 ENCOUNTER — APPOINTMENT (EMERGENCY)
Dept: RADIOLOGY | Facility: HOSPITAL | Age: 34
End: 2019-10-21
Payer: MEDICARE

## 2019-10-21 ENCOUNTER — HOSPITAL ENCOUNTER (EMERGENCY)
Facility: HOSPITAL | Age: 34
Discharge: HOME | End: 2019-10-21
Attending: EMERGENCY MEDICINE | Admitting: EMERGENCY MEDICINE
Payer: MEDICARE

## 2019-10-21 VITALS — BODY MASS INDEX: 37.37 KG/M2 | TEMPERATURE: 99.7 F | WEIGHT: 238.1 LBS | HEIGHT: 67 IN

## 2019-10-21 DIAGNOSIS — R51.9 CEPHALALGIA: Primary | ICD-10-CM

## 2019-10-21 DIAGNOSIS — M54.2 NECK PAIN ON LEFT SIDE: ICD-10-CM

## 2019-10-21 PROCEDURE — 99283 EMERGENCY DEPT VISIT LOW MDM: CPT

## 2019-10-21 PROCEDURE — 72040 X-RAY EXAM NECK SPINE 2-3 VW: CPT

## 2019-10-21 RX ORDER — CYCLOBENZAPRINE HCL 10 MG
10 TABLET ORAL ONCE
Status: COMPLETED | OUTPATIENT
Start: 2019-10-21 | End: 2019-10-21

## 2019-10-21 RX ORDER — OXYCODONE HYDROCHLORIDE AND ACETAMINOPHEN 5; 325 MG/1; MG/1
1 TABLET ORAL ONCE
Status: COMPLETED | OUTPATIENT
Start: 2019-10-21 | End: 2019-10-21

## 2019-10-21 RX ORDER — NAPROXEN 500 MG/1
500 TABLET ORAL ONCE
Status: COMPLETED | OUTPATIENT
Start: 2019-10-21 | End: 2019-10-21

## 2019-10-21 RX ORDER — OXYCODONE HYDROCHLORIDE AND ACETAMINOPHEN 5; 325 MG/1; MG/1
1 TABLET ORAL EVERY 6 HOURS PRN
Qty: 10 TABLET | Refills: 0 | Status: SHIPPED | OUTPATIENT
Start: 2019-10-21 | End: 2019-10-24

## 2019-10-21 RX ORDER — CYCLOBENZAPRINE HCL 10 MG
10 TABLET ORAL 2 TIMES DAILY
Qty: 10 TABLET | Refills: 0 | Status: SHIPPED | OUTPATIENT
Start: 2019-10-21 | End: 2019-12-14

## 2019-10-21 RX ORDER — NAPROXEN 500 MG/1
500 TABLET ORAL 2 TIMES DAILY WITH MEALS
Qty: 10 TABLET | Refills: 0 | Status: SHIPPED | OUTPATIENT
Start: 2019-10-21 | End: 2019-12-14

## 2019-10-21 RX ADMIN — CYCLOBENZAPRINE HYDROCHLORIDE 10 MG: 10 TABLET, FILM COATED ORAL at 20:28

## 2019-10-21 RX ADMIN — OXYCODONE HYDROCHLORIDE AND ACETAMINOPHEN 1 TABLET: 5; 325 TABLET ORAL at 19:53

## 2019-10-21 RX ADMIN — NAPROXEN 500 MG: 500 TABLET ORAL at 19:53

## 2019-10-21 NOTE — ED PROVIDER NOTES
History  Chief Complaint   Patient presents with    Headache     Patient states that she's had a headache for over a week  Feeling like her neck is stiff and then it goes up the back of head  C/O LT LAT POST NECK PAIN AND SPASM X 1 WK, SHOOTING TO OCCIPITAL AREA  MARKED TENDERNESS OVER LEFT PARASPINAL MUSCLES  NO MENINGEAL SIGNS  History provided by:  Patient  Neck Pain   Pain location:  L side  Quality:  Stiffness (SHARP)  Pain radiates to:  Head  Pain severity:  Moderate  Onset quality:  Unable to specify  Duration:  1 week  Timing:  Constant  Progression:  Worsening  Chronicity:  New  Relieved by:  None tried  Worsened by:  Nothing  Ineffective treatments:  None tried  Associated symptoms: headaches    Associated symptoms: no bladder incontinence, no bowel incontinence, no fever, no photophobia, no visual change and no weakness    Headaches:     Severity:  Mild (OCCIPITAL)      Prior to Admission Medications   Prescriptions Last Dose Informant Patient Reported? Taking? albuterol (2 5 mg/3 mL) 0 083 % nebulizer solution   Yes No   Sig: Take 2 5 mg by nebulization 2 (two) times a day  Facility-Administered Medications: None       Past Medical History:   Diagnosis Date    Asthma     [facility])     Macular degeneration     right eye    Psychiatric disorder     depression, anxiety       Past Surgical History:   Procedure Laterality Date     SECTION      TONSILECTOMY AND ADNOIDECTOMY      TUBAL LIGATION  2012       Family History   Problem Relation Age of Onset    Macular degeneration Mother      I have reviewed and agree with the history as documented      Social History     Tobacco Use    Smoking status: Current Every Day Smoker     Packs/day: 0 50     Types: E-Cigarettes    Smokeless tobacco: Never Used   Substance Use Topics    Alcohol use: No     Comment: socially /  never per [employer]    Drug use: No        Review of Systems Constitutional: Negative  Negative for fever  HENT: Negative  Eyes: Negative for photophobia  Gastrointestinal: Negative  Negative for bowel incontinence, nausea and vomiting  Genitourinary: Negative for bladder incontinence  Musculoskeletal: Positive for myalgias and neck pain  Neurological: Positive for headaches  Negative for weakness  Physical Exam  Physical Exam   Constitutional: She is oriented to person, place, and time  She appears well-developed and well-nourished  No distress  HENT:   Head: Normocephalic and atraumatic  Eyes: Pupils are equal, round, and reactive to light  Conjunctivae and EOM are normal    Neck: Muscular tenderness present  Decreased range of motion present  Neurological: She is alert and oriented to person, place, and time  No cranial nerve deficit  Skin: Skin is warm and dry  She is not diaphoretic  Nursing note and vitals reviewed  Vital Signs  ED Triage Vitals [10/21/19 1927]   Temperature Pulse Resp BP SpO2   99 7 °F (37 6 °C) -- -- -- --      Temp src Heart Rate Source Patient Position - Orthostatic VS BP Location FiO2 (%)   -- -- -- -- --      Pain Score       Worst Possible Pain           There were no vitals filed for this visit        Visual Acuity      ED Medications  Medications   cyclobenzaprine (FLEXERIL) tablet 10 mg (10 mg Oral Given 10/21/19 2028)   naproxen (NAPROSYN) tablet 500 mg (500 mg Oral Given 10/21/19 1953)   oxyCODONE-acetaminophen (PERCOCET) 5-325 mg per tablet 1 tablet (1 tablet Oral Given 10/21/19 1953)       Diagnostic Studies  Results Reviewed     None                 XR spine cervical 2 or 3 vw injury   ED Interpretation by Dilshad Zheng MD (10/21 2033)   NEG                 Procedures  Procedures       ED Course                               MDM  Number of Diagnoses or Management Options  Diagnosis management comments: C-S XRAY : NEG    PAIN ALMOST RESOLVED      Disposition  Final diagnoses:   Cephalalgia   Neck pain on left side     Time reflects when diagnosis was documented in both MDM as applicable and the Disposition within this note     Time User Action Codes Description Comment    10/21/2019  8:41 PM Madeline Valiente     10/21/2019  8:41 PM Kelechi Valiente Add [M54 2] Neck pain on left side       ED Disposition     ED Disposition Condition Date/Time Comment    Discharge Stable Mon Oct 21, 2019  8:40 PM Wilma Weiss discharge to home/self care  Follow-up Information     Follow up With Specialties Details Why Contact Info    Jenny Ziegler MD Orthopedic Surgery Schedule an appointment as soon as possible for a visit in 3 days  [address]  [phone]            Patient's Medications   Discharge Prescriptions    CYCLOBENZAPRINE (FLEXERIL) 10 MG TABLET    Take 1 tablet (10 mg total) by mouth 2 (two) times a day for 5 days       Start Date: 10/21/2019End Date: 10/26/2019       Order Dose: 10 mg       Quantity: 10 tablet    Refills: 0    NAPROXEN (NAPROSYN) 500 MG TABLET    Take 1 tablet (500 mg total) by mouth 2 (two) times a day with meals for 5 days       Start Date: 10/21/2019End Date: 10/26/2019       Order Dose: 500 mg       Quantity: 10 tablet    Refills: 0    OXYCODONE-ACETAMINOPHEN (PERCOCET) 5-325 MG PER TABLET    Take 1 tablet by mouth every 6 (six) hours as needed for moderate pain for up to 3 daysMax Daily Amount: 4 tablets       Start Date: 10/21/2019End Date: 10/24/2019       Order Dose: 1 tablet       Quantity: 10 tablet    Refills: 0     No discharge procedures on file      ED Provider  Electronically Signed by           Siddharth Dudley MD  10/21/19 2047

## 2019-12-14 ENCOUNTER — HOSPITAL ENCOUNTER (EMERGENCY)
Facility: HOSPITAL | Age: 34
Discharge: HOME | End: 2019-12-14
Attending: EMERGENCY MEDICINE | Admitting: EMERGENCY MEDICINE
Payer: MEDICARE

## 2019-12-14 VITALS
RESPIRATION RATE: 18 BRPM | TEMPERATURE: 98.2 F | OXYGEN SATURATION: 98 % | DIASTOLIC BLOOD PRESSURE: 72 MMHG | BODY MASS INDEX: 38.13 KG/M2 | HEART RATE: 89 BPM | HEIGHT: 67 IN | WEIGHT: 242.95 LBS | SYSTOLIC BLOOD PRESSURE: 149 MMHG

## 2019-12-14 DIAGNOSIS — R09.81 SINUS CONGESTION: Primary | ICD-10-CM

## 2019-12-14 DIAGNOSIS — M62.838 NECK MUSCLE SPASM: ICD-10-CM

## 2019-12-14 DIAGNOSIS — H66.90 OTITIS MEDIA: ICD-10-CM

## 2019-12-14 DIAGNOSIS — K02.9 DENTAL CARIES: ICD-10-CM

## 2019-12-14 PROCEDURE — 99283 EMERGENCY DEPT VISIT LOW MDM: CPT

## 2019-12-14 RX ORDER — AMOXICILLIN AND CLAVULANATE POTASSIUM 875; 125 MG/1; MG/1
1 TABLET, FILM COATED ORAL EVERY 12 HOURS SCHEDULED
Qty: 14 TABLET | Refills: 0 | Status: SHIPPED | OUTPATIENT
Start: 2019-12-14 | End: 2019-12-21

## 2019-12-14 RX ORDER — TRAMADOL HYDROCHLORIDE 50 MG/1
50 TABLET ORAL ONCE
Status: COMPLETED | OUTPATIENT
Start: 2019-12-14 | End: 2019-12-14

## 2019-12-14 RX ORDER — TRAMADOL HYDROCHLORIDE 50 MG/1
50 TABLET ORAL EVERY 6 HOURS PRN
Qty: 15 TABLET | Refills: 0 | Status: SHIPPED | OUTPATIENT
Start: 2019-12-14 | End: 2019-12-18

## 2019-12-14 RX ORDER — CYCLOBENZAPRINE HCL 10 MG
10 TABLET ORAL 2 TIMES DAILY PRN
Qty: 14 TABLET | Refills: 0 | Status: SHIPPED | OUTPATIENT
Start: 2019-12-14 | End: 2020-10-04

## 2019-12-14 RX ADMIN — TRAMADOL HYDROCHLORIDE 50 MG: 50 TABLET, FILM COATED ORAL at 16:56

## 2019-12-14 NOTE — ED PROVIDER NOTES
History  Chief Complaint   Patient presents with    Neck Pain     started as dental pain  Progressed to bilateral neck pain that radiates to ears  Seeing dentist in January  Aleve and Motrin taken this AM with no improvement   Earache       History provided by:  Patient   used: No      Otherwise healthy 50-year-old female presents emergency department for evaluation treatment of bilateral ear pain, facial pain, neck stiffness  Denies any headache  No worsening of her vision  Does have baseline macular degeneration  No fever  No cough  No chest pain  No shortness of breath  Prior to Admission Medications   Prescriptions Last Dose Informant Patient Reported? Taking? albuterol (2 5 mg/3 mL) 0 083 % nebulizer solution   Yes No   Sig: Take 2 5 mg by nebulization 2 (two) times a day  Facility-Administered Medications: None       Past Medical History:   Diagnosis Date    Asthma     [facility]     Macular degeneration     right eye    Periodontitis     Psychiatric disorder     depression, anxiety       Past Surgical History:   Procedure Laterality Date     SECTION      TONSILECTOMY AND ADNOIDECTOMY      TUBAL LIGATION  2012       Family History   Problem Relation Age of Onset    Macular degeneration Mother      I have reviewed and agree with the history as documented  Social History     Tobacco Use    Smoking status: Current Every Day Smoker     Packs/day: 0 50     Types: E-Cigarettes    Smokeless tobacco: Never Used   Substance Use Topics    Alcohol use: No     Comment: socially /  never per [employer]    Drug use: No        Review of Systems   HENT: Positive for ear pain  Facial pain   Musculoskeletal: Positive for neck stiffness  All other systems reviewed and are negative  Physical Exam  Physical Exam   Constitutional: She is oriented to person, place, and time   She appears well-developed and well-nourished  No distress  HENT:   Tenderness over the frontal sinus  The redness of bilateral tympanic membranes  No tenderness over the mastoid  No trismus  No fullness in the submental area for   Eyes: Pupils are equal, round, and reactive to light  EOM are normal    Neck: Normal range of motion  Neck supple  Full range of motion of neck, no meningeal signs, no bony tenderness   Cardiovascular: Normal rate, regular rhythm and normal heart sounds  No murmur heard  Pulmonary/Chest: Effort normal and breath sounds normal  No respiratory distress  She has no wheezes  She has no rales  Abdominal: Soft  Bowel sounds are normal  She exhibits no distension  There is no tenderness  There is no rebound and no guarding  Musculoskeletal: Normal range of motion  She exhibits no edema or deformity  Lymphadenopathy:     She has no cervical adenopathy  Neurological: She is alert and oriented to person, place, and time  No cranial nerve deficit  She exhibits normal muscle tone  Coordination normal    Skin: Capillary refill takes less than 2 seconds  No rash noted  No erythema  Psychiatric: She has a normal mood and affect  Her behavior is normal    Nursing note and vitals reviewed        Vital Signs  ED Triage Vitals [12/14/19 1619]   Temperature Pulse Respirations Blood Pressure SpO2   98 2 °F (36 8 °C) 89 18 149/72 98 %      Temp Source Heart Rate Source Patient Position - Orthostatic VS BP Location FiO2 (%)   Oral Monitor Sitting Right arm --      Pain Score       9           Vitals:    12/14/19 1619   BP: 149/72   Pulse: 89   Patient Position - Orthostatic VS: Sitting         Visual Acuity      ED Medications  Medications   traMADol (ULTRAM) tablet 50 mg (50 mg Oral Given 12/14/19 1656)       Diagnostic Studies  Results Reviewed     None                 No orders to display              Procedures  Procedures         ED Course                               MDM  Number of Diagnoses or Management Options  Dental caries: new and does not require workup  Neck muscle spasm: new and does not require workup  Otitis media: new and does not require workup  Sinus congestion: new and does not require workup  Diagnosis management comments: Right otitis media as well as sinus congestion  Possibly sinusitis  Will treat with Augmentin  Patient is significant not pain  Will treat with tramadol  Also with neck spasms  Will treat with Flexeril as this has helped her in the past   Recommended follow up with dentistry and Family Practice  No indication for labs or imaging at this time  No signs or symptoms consistent with mastoiditis, parotitis, Camilo's angina  No signs or symptoms consistent meningitis  She is stable for discharge home with outpatient follow-up  Risk of Complications, Morbidity, and/or Mortality  Presenting problems: moderate  Diagnostic procedures: moderate  Management options: moderate    Patient Progress  Patient progress: stable        Disposition  Final diagnoses:   Otitis media   Dental caries   Sinus congestion   Neck muscle spasm     Time reflects when diagnosis was documented in both MDM as applicable and the Disposition within this note     Time User Action Codes Description Comment    12/14/2019  4:48 PM Becki Alisson Add [H66 90] Otitis media     12/14/2019  4:48 PM Becki Alisson Add [K02 9] Dental caries     12/14/2019  4:48 PM Becki Alisson Add [R09 81] Sinus congestion     12/14/2019  4:50 PM Becki Alisson Modify [H66 90] Otitis media     12/14/2019  4:50 PM Nolan Mcardle [R09 81] Sinus congestion     12/14/2019  4:50 PM Becki Alisson Add [N81 104] Neck muscle spasm       ED Disposition     ED Disposition Condition Date/Time Comment    Discharge Stable Sat Dec 14, 2019  4:48 PM Wilma Weiss discharge to home/self care              Follow-up Information     Follow up With Specialties Details Why Contact Info Additional [address] [facility] Schedule an appointment as soon as possible for a visit in 1 week To establish PCP [address] [facility] [address] [address]  [address] [facility], [address] [facility], [address] [facility]          Discharge Medication List as of 12/14/2019  4:53 PM      START taking these medications    Details   amoxicillin-clavulanate (AUGMENTIN) 875-125 mg per tablet Take 1 tablet by mouth every 12 (twelve) hours for 7 days, Starting Sat 12/14/2019, Until Sat 12/21/2019, Print      cyclobenzaprine (FLEXERIL) 10 mg tablet Take 1 tablet (10 mg total) by mouth 2 (two) times a day as needed for muscle spasms for up to 7 days, Starting Sat 12/14/2019, Until Sat 12/21/2019, Print      traMADol (ULTRAM) 50 mg tablet Take 1 tablet (50 mg total) by mouth every 6 (six) hours as needed for moderate pain for up to 4 days, Starting Sat 12/14/2019, Until Wed 12/18/2019, Print         CONTINUE these medications which have NOT CHANGED    Details   albuterol (2 5 mg/3 mL) 0 083 % nebulizer solution Take 2 5 mg by nebulization 2 (two) times a day , Until Discontinued, Historical Med           No discharge procedures on file      ED Provider  Electronically Signed by           Eden Forte MD  12/14/19 9749

## 2020-02-08 ENCOUNTER — APPOINTMENT (EMERGENCY)
Dept: RADIOLOGY | Facility: HOSPITAL | Age: 35
End: 2020-02-08
Attending: EMERGENCY MEDICINE
Payer: MEDICARE

## 2020-02-08 ENCOUNTER — HOSPITAL ENCOUNTER (EMERGENCY)
Facility: HOSPITAL | Age: 35
Discharge: HOME | End: 2020-02-08
Attending: EMERGENCY MEDICINE
Payer: MEDICARE

## 2020-02-08 ENCOUNTER — APPOINTMENT (EMERGENCY)
Dept: RADIOLOGY | Facility: HOSPITAL | Age: 35
End: 2020-02-08
Payer: MEDICARE

## 2020-02-08 VITALS
TEMPERATURE: 98.4 F | OXYGEN SATURATION: 96 % | SYSTOLIC BLOOD PRESSURE: 125 MMHG | RESPIRATION RATE: 21 BRPM | BODY MASS INDEX: 38.06 KG/M2 | WEIGHT: 242.51 LBS | HEIGHT: 67 IN | DIASTOLIC BLOOD PRESSURE: 62 MMHG | HEART RATE: 80 BPM
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
## 2020-02-08 DIAGNOSIS — S00.83XA CONTUSION OF FACE, INITIAL ENCOUNTER: ICD-10-CM

## 2020-02-08 PROCEDURE — 70450 CT HEAD/BRAIN W/O DYE: CPT

## 2020-02-08 PROCEDURE — 71045 X-RAY EXAM CHEST 1 VIEW: CPT

## 2020-02-08 PROCEDURE — 99284 EMERGENCY DEPT VISIT MOD MDM: CPT | Performed by: EMERGENCY MEDICINE

## 2020-02-08 PROCEDURE — 93005 ELECTROCARDIOGRAM TRACING: CPT

## 2020-02-08 PROCEDURE — 70486 CT MAXILLOFACIAL W/O DYE: CPT

## 2020-02-08 PROCEDURE — 99285 EMERGENCY DEPT VISIT HI MDM: CPT

## 2020-02-08 RX ORDER — LORAZEPAM 1 MG/1
1 TABLET ORAL ONCE
Status: COMPLETED | OUTPATIENT
Start: 2020-02-08 | End: 2020-02-08

## 2020-02-08 RX ADMIN — LORAZEPAM 1 MG: 1 TABLET ORAL at 20:05
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
## 2020-02-10 LAB
ATRIAL RATE: 103 BPM
P AXIS: 77 DEGREES
PR INTERVAL: 130 MS
QRS AXIS: 59 DEGREES
QRSD INTERVAL: 88 MS
QT INTERVAL: 330 MS
QTC INTERVAL: 432 MS
T WAVE AXIS: 70 DEGREES
VENTRICULAR RATE: 103 BPM

## 2020-02-10 PROCEDURE — 93010 ELECTROCARDIOGRAM REPORT: CPT | Performed by: INTERNAL MEDICINE

## 2020-06-12 ENCOUNTER — HOSPITAL ENCOUNTER (EMERGENCY)
Facility: HOSPITAL | Age: 35
Discharge: HOME | End: 2020-06-12
Attending: EMERGENCY MEDICINE | Admitting: EMERGENCY MEDICINE
Payer: MEDICARE

## 2020-06-12 ENCOUNTER — APPOINTMENT (EMERGENCY)
Dept: RADIOLOGY | Facility: HOSPITAL | Age: 35
End: 2020-06-12
Payer: MEDICARE

## 2020-06-12 VITALS
DIASTOLIC BLOOD PRESSURE: 70 MMHG | BODY MASS INDEX: 36.49 KG/M2 | SYSTOLIC BLOOD PRESSURE: 142 MMHG | RESPIRATION RATE: 18 BRPM | TEMPERATURE: 98.7 F | OXYGEN SATURATION: 99 % | WEIGHT: 233 LBS | HEART RATE: 62 BPM

## 2020-06-12 DIAGNOSIS — S62.609A FINGER FRACTURE: Primary | ICD-10-CM

## 2020-06-12 DIAGNOSIS — S60.229A HAND CONTUSION: ICD-10-CM

## 2020-06-12 PROCEDURE — 99283 EMERGENCY DEPT VISIT LOW MDM: CPT

## 2020-06-12 PROCEDURE — 99284 EMERGENCY DEPT VISIT MOD MDM: CPT | Performed by: PHYSICIAN ASSISTANT

## 2020-06-12 PROCEDURE — 29130 APPL FINGER SPLINT STATIC: CPT | Performed by: PHYSICIAN ASSISTANT

## 2020-06-12 PROCEDURE — 73130 X-RAY EXAM OF HAND: CPT

## 2020-06-12 RX ORDER — IBUPROFEN 600 MG/1
600 TABLET ORAL ONCE
Status: COMPLETED | OUTPATIENT
Start: 2020-06-12 | End: 2020-06-12

## 2020-06-12 RX ADMIN — IBUPROFEN 600 MG: 600 TABLET, FILM COATED ORAL at 17:51

## 2020-10-04 ENCOUNTER — HOSPITAL ENCOUNTER (EMERGENCY)
Facility: HOSPITAL | Age: 35
Discharge: HOME | End: 2020-10-04
Attending: EMERGENCY MEDICINE | Admitting: EMERGENCY MEDICINE
Payer: MEDICARE

## 2020-10-04 ENCOUNTER — APPOINTMENT (EMERGENCY)
Dept: RADIOLOGY | Facility: HOSPITAL | Age: 35
End: 2020-10-04
Payer: MEDICARE

## 2020-10-04 ENCOUNTER — APPOINTMENT (EMERGENCY)
Dept: RADIOLOGY | Facility: HOSPITAL | Age: 35
End: 2020-10-04
Attending: EMERGENCY MEDICINE
Payer: MEDICARE

## 2020-10-04 VITALS
RESPIRATION RATE: 24 BRPM | TEMPERATURE: 98.7 F | SYSTOLIC BLOOD PRESSURE: 158 MMHG | BODY MASS INDEX: 35.24 KG/M2 | DIASTOLIC BLOOD PRESSURE: 109 MMHG | OXYGEN SATURATION: 100 % | WEIGHT: 225 LBS | HEART RATE: 82 BPM

## 2020-10-04 DIAGNOSIS — M25.561 RIGHT KNEE PAIN: ICD-10-CM
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
## 2020-10-04 PROCEDURE — 99284 EMERGENCY DEPT VISIT MOD MDM: CPT

## 2020-10-04 PROCEDURE — 99284 EMERGENCY DEPT VISIT MOD MDM: CPT | Performed by: EMERGENCY MEDICINE

## 2020-10-04 PROCEDURE — G1004 CDSM NDSC: HCPCS

## 2020-10-04 PROCEDURE — 70450 CT HEAD/BRAIN W/O DYE: CPT

## 2020-10-04 PROCEDURE — 70486 CT MAXILLOFACIAL W/O DYE: CPT

## 2020-10-04 PROCEDURE — 73564 X-RAY EXAM KNEE 4 OR MORE: CPT

## 2020-10-04 RX ORDER — NAPROXEN 500 MG/1
500 TABLET ORAL 2 TIMES DAILY WITH MEALS
Qty: 14 TABLET | Refills: 0 | Status: SHIPPED | OUTPATIENT
Start: 2020-10-04 | End: 2020-10-11

## 2020-10-04 RX ORDER — ACETAMINOPHEN 325 MG/1
975 TABLET ORAL ONCE
Status: COMPLETED | OUTPATIENT
Start: 2020-10-04 | End: 2020-10-04

## 2020-10-04 RX ADMIN — ACETAMINOPHEN 975 MG: 325 TABLET, FILM COATED ORAL at 01:06

## 2020-12-27 ENCOUNTER — HOSPITAL ENCOUNTER (EMERGENCY)
Facility: HOSPITAL | Age: 35
Discharge: HOME | End: 2020-12-27
Attending: EMERGENCY MEDICINE
Payer: MEDICARE

## 2020-12-27 VITALS
RESPIRATION RATE: 20 BRPM | DIASTOLIC BLOOD PRESSURE: 94 MMHG | SYSTOLIC BLOOD PRESSURE: 157 MMHG | TEMPERATURE: 99.5 F | HEART RATE: 77 BPM | OXYGEN SATURATION: 97 %

## 2020-12-27 DIAGNOSIS — K08.89 PAIN, DENTAL: Primary | ICD-10-CM

## 2020-12-27 PROCEDURE — 99283 EMERGENCY DEPT VISIT LOW MDM: CPT | Performed by: EMERGENCY MEDICINE

## 2020-12-27 PROCEDURE — 99282 EMERGENCY DEPT VISIT SF MDM: CPT

## 2020-12-27 PROCEDURE — 96372 THER/PROPH/DIAG INJ SC/IM: CPT

## 2020-12-27 RX ORDER — NAPROXEN 500 MG/1
500 TABLET ORAL 2 TIMES DAILY WITH MEALS
Qty: 14 TABLET | Refills: 0 | Status: SHIPPED | OUTPATIENT
Start: 2020-12-27 | End: 2022-03-23 | Stop reason: ALTCHOICE

## 2020-12-27 RX ORDER — KETOROLAC TROMETHAMINE 30 MG/ML
15 INJECTION, SOLUTION INTRAMUSCULAR; INTRAVENOUS ONCE
Status: COMPLETED | OUTPATIENT
Start: 2020-12-27 | End: 2020-12-27

## 2020-12-27 RX ORDER — CLINDAMYCIN HYDROCHLORIDE 300 MG/1
300 CAPSULE ORAL 3 TIMES DAILY
Qty: 21 CAPSULE | Refills: 0 | Status: SHIPPED | OUTPATIENT
Start: 2020-12-27 | End: 2021-01-03

## 2020-12-27 RX ORDER — CLINDAMYCIN HYDROCHLORIDE 150 MG/1
450 CAPSULE ORAL ONCE
Status: COMPLETED | OUTPATIENT
Start: 2020-12-27 | End: 2020-12-27

## 2020-12-27 RX ADMIN — KETOROLAC TROMETHAMINE 15 MG: 30 INJECTION, SOLUTION INTRAMUSCULAR at 21:46

## 2020-12-27 RX ADMIN — CLINDAMYCIN HYDROCHLORIDE 450 MG: 150 CAPSULE ORAL at 21:44

## 2021-06-15 ENCOUNTER — OFFICE VISIT (OUTPATIENT)
Dept: FAMILY MEDICINE CLINIC | Facility: CLINIC | Age: 36
End: 2021-06-15
Payer: COMMERCIAL

## 2021-06-15 VITALS
HEIGHT: 67 IN | HEART RATE: 87 BPM | TEMPERATURE: 98.6 F | BODY MASS INDEX: 35.94 KG/M2 | RESPIRATION RATE: 18 BRPM | SYSTOLIC BLOOD PRESSURE: 118 MMHG | DIASTOLIC BLOOD PRESSURE: 78 MMHG | OXYGEN SATURATION: 98 % | WEIGHT: 229 LBS

## 2021-06-15 DIAGNOSIS — Z20.2 EXPOSURE TO SEXUALLY TRANSMITTED DISEASE (STD): Primary | ICD-10-CM

## 2021-06-15 DIAGNOSIS — Z86.19 HISTORY OF GONORRHEA: ICD-10-CM

## 2021-06-15 DIAGNOSIS — L72.3 SEBACEOUS CYST: ICD-10-CM

## 2021-06-15 DIAGNOSIS — Z12.4 SCREENING FOR CERVICAL CANCER: ICD-10-CM

## 2021-06-15 DIAGNOSIS — N88.0: ICD-10-CM

## 2021-06-15 PROCEDURE — 99213 OFFICE O/P EST LOW 20 MIN: CPT | Performed by: OBSTETRICS & GYNECOLOGY
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
## 2021-06-16 ENCOUNTER — TELEPHONE (OUTPATIENT)
Dept: FAMILY MEDICINE CLINIC | Facility: CLINIC | Age: 36
End: 2021-06-16

## 2021-06-16 LAB
HIV 1+2 AB+HIV1 P24 AG SERPL QL IA: NON REACTIVE
RPR SER QL: NON REACTIVE

## 2021-06-17 ENCOUNTER — TELEPHONE (OUTPATIENT)
Dept: FAMILY MEDICINE CLINIC | Facility: CLINIC | Age: 36
End: 2021-06-17

## 2021-06-17 LAB
CYTOLOGIST CVX/VAG CYTO: NORMAL
DX ICD CODE: NORMAL
HPV I/H RISK 4 DNA CVX QL PROBE+SIG AMP: NEGATIVE
OTHER STN SPEC: NORMAL
PATH REPORT.FINAL DX SPEC: NORMAL
SL AMB NOTE:: NORMAL
SL AMB SPECIMEN ADEQUACY: NORMAL
SL AMB TEST METHODOLOGY: NORMAL

## 2021-06-17 NOTE — TELEPHONE ENCOUNTER
Called patient to discuss negative pap smear and neg HPV result  She is aware the STD testing is still pending

## 2021-06-20 LAB
BACTERIA GENITAL AEROBE CULT: NORMAL
C TRACH RRNA SPEC QL NAA+PROBE: NEGATIVE
Lab: NORMAL
N GONORRHOEA RRNA SPEC QL NAA+PROBE: NEGATIVE

## 2021-06-21 ENCOUNTER — TELEPHONE (OUTPATIENT)
Dept: FAMILY MEDICINE CLINIC | Facility: CLINIC | Age: 36
End: 2021-06-21

## 2021-07-01 ENCOUNTER — OFFICE VISIT (OUTPATIENT)
Dept: FAMILY MEDICINE CLINIC | Facility: CLINIC | Age: 36
End: 2021-07-01
Payer: COMMERCIAL

## 2021-07-01 VITALS
OXYGEN SATURATION: 98 % | WEIGHT: 225.3 LBS | HEIGHT: 67 IN | TEMPERATURE: 98.5 F | HEART RATE: 80 BPM | RESPIRATION RATE: 18 BRPM | SYSTOLIC BLOOD PRESSURE: 134 MMHG | DIASTOLIC BLOOD PRESSURE: 80 MMHG | BODY MASS INDEX: 35.36 KG/M2

## 2021-07-01 DIAGNOSIS — N88.0: Primary | ICD-10-CM

## 2021-07-01 DIAGNOSIS — N90.7 SEBACEOUS CYST OF LABIA: ICD-10-CM

## 2021-07-01 PROCEDURE — 88305 TISSUE EXAM BY PATHOLOGIST: CPT | Performed by: PATHOLOGY

## 2021-07-01 PROCEDURE — 57455 BIOPSY OF CERVIX W/SCOPE: CPT | Performed by: OBSTETRICS & GYNECOLOGY

## 2021-07-01 PROCEDURE — 88344 IMHCHEM/IMCYTCHM EA MLT ANTB: CPT | Performed by: PATHOLOGY

## 2021-07-01 NOTE — PROGRESS NOTES
[address] Visit  Wilma Smith Mariah  38 y o  female  [id number]     Subjective:      Patient ID: Humaira Enriquez is a 38 y o  female  HPI  Patient is a 37 y/o F with a PMH of self-reported cervical abnormality s/p cryotherapy several years ago presenting for colposcopy after a finding of leukoplakia during her pap smear  Patient has not had her pap smear for several years prior to the most recent one  Her pap smear from 6/15/2021 was negative with negative co-testing  Patient has an acute complaint of irritation around the area of her sebaceous cyst in the labia  Review of Systems   Genitourinary: Positive for vaginal discharge  Negative for menstrual problem, pelvic pain, vaginal bleeding and vaginal pain  Irritation around cyst         Objective:    /80 (BP Location: Left arm, Patient Position: Sitting, Cuff Size: Adult)   Pulse 80   Temp 98 5 °F (36 9 °C) (Tympanic)   Resp 18   Ht 5' 7" (1 702 m)   Wt 102 kg (225 lb 4 8 oz)   LMP 06/05/2021   SpO2 98%   BMI 35 29 kg/m²        Physical Exam  Vitals and nursing note reviewed  Exam conducted with a chaperone present  Constitutional:       General: She is not in acute distress  Appearance: She is not ill-appearing, toxic-appearing or diaphoretic  HENT:      Head: Normocephalic and atraumatic  Genitourinary:     General: Normal vulva  Pubic Area: No rash  Labia:         Right: Lesion (as noted in the diagram) present  No tenderness or injury  Left: No tenderness, lesion or injury  Vagina: Normal  No vaginal discharge or tenderness  Cervix: Lesion present  No friability, erythema or cervical bleeding  Comments: Leukoplakia defined better with aceto acid application   Neurological:      Mental Status: She is alert             Assessment/Plan:     Diagnoses and all orders for this visit:    Leukoplakia of cervix  -     Tissue Exam  - see under procedure note below    Sebaceous cyst of labia  -     Ambulatory referral to Gynecology; Future      Colposcopy    Date/Time: 7/1/2021 4:09 PM  Performed by: Barbra Cheadle, DO  Authorized by: Barbra Cheadle, DO     Consent:     Consent obtained:  Verbal and written    Consent given by:  Patient    Procedural risks discussed:  Bleeding, failure rate, infection and repeat procedure    Patient questions answered: yes      Patient agrees, verbalizes understanding, and wants to proceed: yes    Pre-procedure:     Prepped with: acetic acid    Indication:     Indications: leukoplakia  Procedure:     Procedure: Colposcopy w/ biopsy of cervix      [place] speculum was placed in the vagina: yes      Under colposcopic examination the transition zone was seen in entirety: yes      Cervical biopsy performed with a cervical biopsy punch: yes      Monsel's solution was applied: yes      Biopsy(s): yes      Location:  3:00, 4:00, 10:00, [facility]    Specimen to pathology: yes    Post-procedure:     Findings: Bleeding      Findings comment:  Leukoplakia that appeared more defiend after aceto acid application    Patient tolerance of procedure: Tolerated well, no immediate complications  Comments:      Dr Rubén Hendricks the attending was present at all time during the procedure for guidance   RTC for annual PE   The patient was counseled regarding diagnostic results, instructions for management, risk factor reductions, prognosis, patient and family education, impressions, risks and benefits of treatment options  The treatment plan was reviewed with the patient/guardian  The patient/guardian understands and agrees with the treatment plan  --  Alejandro Dudley DO    "This note has been constructed using a voice recognition system  Therefore there may be syntax, spelling, and/or grammatical errors   Please call if you have any questions "

## 2021-07-13 ENCOUNTER — TELEPHONE (OUTPATIENT)
Dept: FAMILY MEDICINE CLINIC | Facility: CLINIC | Age: 36
End: 2021-07-13

## 2021-07-13 NOTE — TELEPHONE ENCOUNTER
Patient is requesting a call back with results from [address] [place] [address] done on 7/1/2021  States she does see results in My Chart but would like a call back to further discuss   Patient can be reached at the number listed below;      [phone]

## 2021-07-15 ENCOUNTER — TELEPHONE (OUTPATIENT)
Dept: FAMILY MEDICINE CLINIC | Facility: CLINIC | Age: 36
End: 2021-07-15

## 2021-07-20 ENCOUNTER — OFFICE VISIT (OUTPATIENT)
Dept: FAMILY MEDICINE CLINIC | Facility: CLINIC | Age: 36
End: 2021-07-20
Payer: COMMERCIAL

## 2021-07-20 VITALS
RESPIRATION RATE: 18 BRPM | OXYGEN SATURATION: 97 % | HEIGHT: 67 IN | DIASTOLIC BLOOD PRESSURE: 64 MMHG | HEART RATE: 55 BPM | BODY MASS INDEX: 35.31 KG/M2 | WEIGHT: 225 LBS | TEMPERATURE: 98.7 F | SYSTOLIC BLOOD PRESSURE: 104 MMHG

## 2021-07-20 DIAGNOSIS — Z71.2 ENCOUNTER TO DISCUSS TEST RESULTS: Primary | ICD-10-CM

## 2021-07-20 PROCEDURE — 99213 OFFICE O/P EST LOW 20 MIN: CPT | Performed by: OBSTETRICS & GYNECOLOGY

## 2021-07-20 NOTE — PROGRESS NOTES
Assessment/Plan:    No problem-specific Assessment & Plan notes found for this encounter  Diagnoses and all orders for this visit:    Encounter to discuss test results        At the pleasure of seeing current today for discussion of her Pap smear results  Pap did demonstrate cervical parakeratosis/hyperkeratosis with koilocytic atypia  Educated   The patient on the significance of the results another changes are likely secondary to prior HPV infection  Currently these results do not require a colposcopy and she should follow-up in 3 years for repeat Pap with Co testing  All her questions were answered  I did discuss these results and the appropriate follow-up plan with Dr Yary Yanes  She should follow-up at her convenience for her Medicare annual wellness visit at which point we can address her outstanding care gaps  Subjective:      Patient ID: Luetta Homans is a 38 y o  female  Patient presents today to discuss her Pap smear results  Her tissue exam did demonstrate Cervical parakeratosis/hyperkeratosis  With koilocytic atypia  Did discuss the significance of the results and the fact that they are not indicative of cancer or the need for biopsy  Patient states that she has had abnormal Paps in the past which did require cryotherapy  The following portions of the patient's history were reviewed and updated as appropriate:   She  has a past medical history of Asthma, COPD (chronic obstructive pulmonary disease) ([address] ), Macular degeneration, Periodontitis, and Psychiatric disorder  She   Patient Active Problem List    Diagnosis Date Noted    Effusion of left knee 2018    Gastrocnemius tear, left, subsequent encounter 2018    Scabies 2018     She  has a past surgical history that includes  section; TONSILECTOMY AND ADNOIDECTOMY; and Tubal ligation ()    Her family history includes Arthritis in her mother; Asthma in her daughter and mother; Diabetes in her father, mother, and sister; Hyperlipidemia in her daughter and mother; Hypertension in her father and mother; Thyroid disease in her daughter, mother, and sister  She  reports that she has been smoking cigarettes  She has a 12 50 pack-year smoking history  She has never used smokeless tobacco  She reports that she does not drink alcohol and does not use drugs  Current Outpatient Medications   Medication Sig Dispense Refill    albuterol (2 5 mg/3 mL) 0 083 % nebulizer solution Take 2 5 mg by nebulization 2 (two) times a day  (Patient not taking: Reported on 7/20/2021)      naproxen (NAPROSYN) 500 mg tablet Take 1 tablet (500 mg total) by mouth 2 (two) times a day with meals for 7 days 14 tablet 0    naproxen (NAPROSYN) 500 mg tablet Take 1 tablet (500 mg total) by mouth 2 (two) times a day with meals for 7 days 14 tablet 0     No current facility-administered medications for this visit  Current Outpatient Medications on File Prior to Visit   Medication Sig    albuterol (2 5 mg/3 mL) 0 083 % nebulizer solution Take 2 5 mg by nebulization 2 (two) times a day  (Patient not taking: Reported on 7/20/2021)    naproxen (NAPROSYN) 500 mg tablet Take 1 tablet (500 mg total) by mouth 2 (two) times a day with meals for 7 days    naproxen (NAPROSYN) 500 mg tablet Take 1 tablet (500 mg total) by mouth 2 (two) times a day with meals for 7 days     No current facility-administered medications on file prior to visit  She is allergic to penicillins       Review of Systems   Constitutional: Negative for chills and fever  HENT: Negative for ear pain and sore throat  Eyes: Negative for pain and visual disturbance  Respiratory: Negative for cough and shortness of breath  Cardiovascular: Negative for chest pain and palpitations  Gastrointestinal: Negative for abdominal pain and vomiting  Genitourinary: Negative for dysuria and hematuria  Musculoskeletal: Negative for arthralgias and back pain     Skin: Negative for color change and rash  Neurological: Negative for seizures and syncope  All other systems reviewed and are negative  Objective:      /64 (BP Location: Left arm, Patient Position: Sitting, Cuff Size: Adult)   Pulse 55   Temp 98 7 °F (37 1 °C) (Tympanic)   Resp 18   Ht 5' 7" (1 702 m)   Wt 102 kg (225 lb)   LMP 07/02/2021 (Exact Date)   SpO2 97%   BMI 35 24 kg/m²          Physical Exam  Constitutional:       Appearance: She is well-developed  HENT:      Head: Normocephalic and atraumatic  Right Ear: External ear normal       Left Ear: External ear normal    Eyes:      Extraocular Movements: Extraocular movements intact  Conjunctiva/sclera: Conjunctivae normal    Cardiovascular:      Rate and Rhythm: Normal rate  Pulmonary:      Effort: Pulmonary effort is normal  No respiratory distress  Abdominal:      General: Bowel sounds are normal       Palpations: Abdomen is soft  Tenderness: There is no abdominal tenderness  Neurological:      Mental Status: She is alert and oriented to person, place, and time     Psychiatric:         Speech: Speech normal          Behavior: Behavior normal

## 2021-07-30 ENCOUNTER — OFFICE VISIT (OUTPATIENT)
Dept: FAMILY MEDICINE CLINIC | Facility: CLINIC | Age: 36
End: 2021-07-30
Payer: COMMERCIAL

## 2021-07-30 VITALS
HEART RATE: 66 BPM | WEIGHT: 227 LBS | TEMPERATURE: 97.5 F | SYSTOLIC BLOOD PRESSURE: 122 MMHG | BODY MASS INDEX: 35.63 KG/M2 | RESPIRATION RATE: 18 BRPM | OXYGEN SATURATION: 99 % | HEIGHT: 67 IN | DIASTOLIC BLOOD PRESSURE: 80 MMHG

## 2021-07-30 DIAGNOSIS — Z82.49 FAMILY HISTORY OF ABDOMINAL AORTIC ANEURYSM (AAA): Primary | ICD-10-CM

## 2021-07-30 DIAGNOSIS — Z13.6 SCREENING FOR CARDIOVASCULAR CONDITION: ICD-10-CM

## 2021-07-30 DIAGNOSIS — Z83.3 FAMILY HISTORY OF DIABETES MELLITUS (DM): ICD-10-CM

## 2021-07-30 DIAGNOSIS — F32.A DEPRESSION, UNSPECIFIED DEPRESSION TYPE: ICD-10-CM

## 2021-07-30 DIAGNOSIS — N92.0 MENORRHAGIA WITH REGULAR CYCLE: ICD-10-CM

## 2021-07-30 PROCEDURE — 3725F SCREEN DEPRESSION PERFORMED: CPT | Performed by: FAMILY MEDICINE

## 2021-07-30 PROCEDURE — G0402 INITIAL PREVENTIVE EXAM: HCPCS | Performed by: FAMILY MEDICINE

## 2021-07-30 PROCEDURE — 4004F PT TOBACCO SCREEN RCVD TLK: CPT | Performed by: FAMILY MEDICINE

## 2021-07-30 PROCEDURE — 3008F BODY MASS INDEX DOCD: CPT | Performed by: FAMILY MEDICINE

## 2021-07-30 NOTE — PROGRESS NOTES
[address] Visit  Wilma Dupree  38 y o  female  [id number]     Subjective:      Patient ID: Toney East is a 38 y o  female  HPI    Patient presented with a complaint of heavy menses that started after colposcopy that was done a month ago  Patient states that since the colposcopy procedure, she has been experiencing heavier menses with worsened back pain during her periods  Patient also notes that she felt dizzy yesterday  She states that she  Has been changing her tampon once every hour which is unusually heavy for her  Patient reports decreased appetite and weight loss due to "feeling depressed" and feels "very stressed out", denies SI/HI  Patient is a single parent for her children with health issues  Review of Systems    See above    Objective:    /80 (BP Location: Left arm, Patient Position: Sitting, Cuff Size: Standard)   Pulse 66   Temp 97 5 °F (36 4 °C) (Tympanic)   Resp 18   Ht 5' 7" (1 702 m)   Wt 103 kg (227 lb)   LMP 07/28/2021   SpO2 99%   BMI 35 55 kg/m²        Physical Exam  Vitals and nursing note reviewed  Constitutional:       General: She is not in acute distress  Appearance: She is obese  She is not ill-appearing, toxic-appearing or diaphoretic  HENT:      Head: Normocephalic and atraumatic  Right Ear: Tympanic membrane, ear canal and external ear normal  There is no impacted cerumen  Left Ear: Tympanic membrane, ear canal and external ear normal  There is no impacted cerumen  Nose: Nose normal       Mouth/Throat:      Mouth: Mucous membranes are moist       Pharynx: No oropharyngeal exudate  Eyes:      General: No scleral icterus  Right eye: No discharge  Left eye: No discharge  Extraocular Movements: Extraocular movements intact  Conjunctiva/sclera: Conjunctivae normal    Cardiovascular:      Rate and Rhythm: Normal rate and regular rhythm  Pulses: Normal pulses        Heart sounds: Normal heart sounds  Pulmonary:      Effort: Pulmonary effort is normal       Breath sounds: Normal breath sounds  Abdominal:      General: Abdomen is flat  Bowel sounds are normal       Palpations: Abdomen is soft  Musculoskeletal:         General: No swelling  Normal range of motion  Cervical back: Normal range of motion and neck supple  Skin:     General: Skin is warm and dry  Neurological:      Mental Status: She is alert and oriented to person, place, and time  Assessment/Plan:    Menorrhagia with regular cycle  -     CBC and differential; Future    Depression, unspecified depression type  - may be situational, patient is very anxious especially regarding care for her children and her own health  - patient provided with information for local psychiatric services; patient has some financial concerns, I advised the patient to reach out to me for social work referral if she has any financial difficulties for her care     RTC PRN   The patient was counseled regarding diagnostic results, instructions for management, risk factor reductions, prognosis, patient and family education, impressions, risks and benefits of treatment options  The treatment plan was reviewed with the patient/guardian  The patient/guardian understands and agrees with the treatment plan  --  [facility], DO    "This note has been constructed using a voice recognition system  Therefore there may be syntax, spelling, and/or grammatical errors   Please call if you have any questions "

## 2021-07-30 NOTE — PROGRESS NOTES
Wilma is here for her Initial Wellness visit  Last Medicare Wellness visit information reviewed, patient interviewed and updates made to the record today  Health Risk Assessment:   Patient rates overall health as fair  Patient feels that their physical health rating is slightly worse  Patient is dissatisfied with their life  Eyesight was rated as much worse  Hearing was rated as same  Patient feels that their emotional and mental health rating is slightly worse  Patients states they are sometimes angry  Patient states they are often unusually tired/fatigued  Pain experienced in the last 7 days has been a lot  Patient's pain rating has been 7/10  Patient states that she has experienced weight loss or gain in last 6 months  Back pain (herniated disc/also with menses), sciatica (right)  Menstrual pain, menorrhagia     Depression Screening:   PHQ-2 Score: 4  PHQ-9 Score: 19      Fall Risk Screening: In the past year, patient has experienced: no history of falling in past year      Urinary Incontinence Screening:   Patient has not leaked urine accidently in the last six months  Home Safety:  Patient does not have trouble with stairs inside or outside of their home  Patient has working smoke alarms and has working carbon monoxide detector  Home safety hazards include: none  Nutrition:   Current diet is Regular  Medications:   Patient is currently taking over-the-counter supplements  OTC medications include: see medication list  Patient is able to manage medications  Tylenol,motrin     Activities of Daily Living (ADLs)/Instrumental Activities of Daily Living (IADLs):   Walk and transfer into and out of bed and chair?: Yes  Dress and groom yourself?: Yes    Bathe or shower yourself?: Yes    Feed yourself?  Yes  Do your laundry/housekeeping?: Yes  Manage your money, pay your bills and track your expenses?: Yes  Make your own meals?: Yes    Do your own shopping?: Yes    Durable Medical Equipment Suppliers  none    Previous Hospitalizations:   Any hospitalizations or ED visits within the last 12 months?: No      Advance Care Planning:   Living will: No    Durable POA for healthcare: No    Advanced directive: No    Advanced directive counseling given: No    Five wishes given: Yes    Patient declined ACP directive: No    End of Life Decisions reviewed with patient: No    Provider agrees with end of life decisions: No      Cognitive Screening:   Provider or family/friend/caregiver concerned regarding cognition?: No    PREVENTIVE SCREENINGS      Cardiovascular Screening:      Due for: Lipid Panel      Diabetes Screening:       Due for: Blood Glucose      Breast Cancer Screening:     General: Screening Not Indicated      Cervical Cancer Screening:    General: Screening Current      Osteoporosis Screening:    General: Screening Not Indicated      Abdominal Aortic Aneurysm (AAA) Screening:    Risk factors include: family history of AAA      Due for: Screening AAA Ultrasound      Lung Cancer Screening:     General: Screening Not Indicated      Hepatitis C Screening:    General: Screening Current    Screening, Brief Intervention, and Referral to Treatment (SBIRT)    Screening  Typical number of drinks in a day: 0  Typical number of drinks in a week: 0  Interpretation: Low risk drinking behavior

## 2021-07-31 LAB
BASOPHILS # BLD AUTO: 0.1 X10E3/UL (ref 0–0.2)
BASOPHILS NFR BLD AUTO: 1 %
CHOLEST SERPL-MCNC: 213 MG/DL (ref 100–199)
EOSINOPHIL # BLD AUTO: 0.2 X10E3/UL (ref 0–0.4)
EOSINOPHIL NFR BLD AUTO: 2 %
ERYTHROCYTE [DISTWIDTH] IN BLOOD BY AUTOMATED COUNT: 13.6 % (ref 11.7–15.4)
EST. AVERAGE GLUCOSE BLD GHB EST-MCNC: 100 MG/DL
HBA1C MFR BLD: 5.1 % (ref 4.8–5.6)
HCT VFR BLD AUTO: 40.5 % (ref 34–46.6)
HDLC SERPL-MCNC: 40 MG/DL
HGB BLD-MCNC: 14.3 G/DL (ref 11.1–15.9)
IMM GRANULOCYTES # BLD: 0 X10E3/UL (ref 0–0.1)
IMM GRANULOCYTES NFR BLD: 0 %
LDLC SERPL CALC-MCNC: 159 MG/DL (ref 0–99)
LYMPHOCYTES # BLD AUTO: 3 X10E3/UL (ref 0.7–3.1)
LYMPHOCYTES NFR BLD AUTO: 31 %
MCH RBC QN AUTO: 32.6 PG (ref 26.6–33)
MCHC RBC AUTO-ENTMCNC: 35.3 G/DL (ref 31.5–35.7)
MCV RBC AUTO: 92 FL (ref 79–97)
MONOCYTES # BLD AUTO: 0.7 X10E3/UL (ref 0.1–0.9)
MONOCYTES NFR BLD AUTO: 7 %
NEUTROPHILS # BLD AUTO: 5.8 X10E3/UL (ref 1.4–7)
NEUTROPHILS NFR BLD AUTO: 59 %
PLATELET # BLD AUTO: 325 X10E3/UL (ref 150–450)
RBC # BLD AUTO: 4.39 X10E6/UL (ref 3.77–5.28)
TRIGL SERPL-MCNC: 77 MG/DL (ref 0–149)
WBC # BLD AUTO: 9.7 X10E3/UL (ref 3.4–10.8)

## 2021-08-10 ENCOUNTER — IMMUNIZATIONS (OUTPATIENT)
Dept: FAMILY MEDICINE CLINIC | Facility: HOSPITAL | Age: 36
End: 2021-08-10

## 2021-08-10 DIAGNOSIS — Z23 ENCOUNTER FOR IMMUNIZATION: Primary | ICD-10-CM

## 2021-08-10 PROCEDURE — 0011A SARS-COV-2 / COVID-19 MRNA VACCINE (MODERNA) 100 MCG: CPT

## 2021-08-10 PROCEDURE — 91301 SARS-COV-2 / COVID-19 MRNA VACCINE (MODERNA) 100 MCG: CPT

## 2021-09-07 ENCOUNTER — IMMUNIZATIONS (OUTPATIENT)
Dept: FAMILY MEDICINE CLINIC | Facility: HOSPITAL | Age: 36
End: 2021-09-07

## 2021-09-07 DIAGNOSIS — Z23 ENCOUNTER FOR IMMUNIZATION: Primary | ICD-10-CM

## 2021-09-07 PROCEDURE — 91301 SARS-COV-2 / COVID-19 MRNA VACCINE (MODERNA) 100 MCG: CPT

## 2021-09-07 PROCEDURE — 0012A SARS-COV-2 / COVID-19 MRNA VACCINE (MODERNA) 100 MCG: CPT

## 2022-01-03 ENCOUNTER — TELEMEDICINE (OUTPATIENT)
Dept: FAMILY MEDICINE CLINIC | Facility: CLINIC | Age: 37
End: 2022-01-03
Payer: COMMERCIAL

## 2022-01-03 DIAGNOSIS — Z20.822 EXPOSURE TO COVID-19 VIRUS: ICD-10-CM

## 2022-01-03 DIAGNOSIS — U07.1 COVID-19: ICD-10-CM

## 2022-01-03 PROCEDURE — 87636 SARSCOV2 & INF A&B AMP PRB: CPT | Performed by: STUDENT IN AN ORGANIZED HEALTH CARE EDUCATION/TRAINING PROGRAM

## 2022-01-03 PROCEDURE — 99212 OFFICE O/P EST SF 10 MIN: CPT | Performed by: FAMILY MEDICINE

## 2022-01-03 NOTE — PROGRESS NOTES
COVID-19 Outpatient Progress Note    Assessment/Plan:    Problem List Items Addressed This Visit     None      Visit Diagnoses     Exposure to COVID-19 virus        COVID-19        Relevant Orders    COVID/FLU- Collected at Mobile [facility] or [facility]         Disposition:     I have spent 10 minutes directly with the patient  Encounter provider John Gil MD    Provider located at [address] [place] [id number]    Recent Visits  No visits were found meeting these conditions  Showing recent visits within past 7 days and meeting all other requirements  Today's Visits  Date Type Provider Dept   01/03/22 Telemedicine John Gil MD [facility]   Showing today's visits and meeting all other requirements  Future Appointments  No visits were found meeting these conditions  Showing future appointments within next 150 days and meeting all other requirements     This virtual check-in was done via telephone and she agrees to proceed  Patient agrees to participate in a virtual check in via telephone or video visit instead of presenting to the office to address urgent/immediate medical needs  Patient is aware this is a billable service  After connecting through Telephone, the patient was identified by name and date of birth  Aristides Coughlin was informed that this was a telemedicine visit and that the exam was being conducted confidentially over secure lines  Aristides Coughlin acknowledged consent and understanding of privacy and security of the telemedicine visit  I informed the patient that I have reviewed her record in Epic and presented the opportunity for her to ask any questions regarding the visit today  The patient agreed to participate      Verification of patient location:  Patient is located in the following state in which I hold an active license: [place]    Subjective:   Aristides Coughlin is a 39 y o  female who is concerned about COVID-19  Patient's symptoms include fever, rhinorrhea, sore throat, anosmia, loss of taste, cough, nausea and headache  Patient denies vomiting and diarrhea  Date of symptom onset: 2021  Date of exposure: 2021  COVID-19 vaccination status: Fully vaccinated (primary series)    No results found for: SARSCOV2, [address], SARSCORONAVI, CORONAVIRUSR, [address]  Past Medical History:   Diagnosis Date    Asthma     COPD (chronic obstructive pulmonary disease) ([facility] [address] )     Macular degeneration     right eye    Periodontitis     Psychiatric disorder     depression, anxiety     Past Surgical History:   Procedure Laterality Date     SECTION      TONSILECTOMY AND ADNOIDECTOMY      TUBAL LIGATION       Current Outpatient Medications   Medication Sig Dispense Refill    albuterol (2 5 mg/3 mL) 0 083 % nebulizer solution Take 2 5 mg by nebulization 2 (two) times a day  (Patient not taking: Reported on 2021)      naproxen (NAPROSYN) 500 mg tablet Take 1 tablet (500 mg total) by mouth 2 (two) times a day with meals for 7 days 14 tablet 0     No current facility-administered medications for this visit  Allergies   Allergen Reactions    Penicillins Hives     Reaction Date: 2006;        Review of Systems   Constitutional: Positive for fever  HENT: Positive for rhinorrhea and sore throat  Respiratory: Positive for cough  Gastrointestinal: Positive for nausea  Negative for diarrhea and vomiting  Neurological: Positive for headaches  VIRTUAL VISIT DISCLAIMER    Wilma Weiss verbally agrees to participate in [employer]  Pt is aware that [employer] could be limited without vital signs or the ability to perform a full hands-on physical [place] understands she or the provider may request at any time to terminate the video visit and request the patient to seek care or treatment in person

## 2022-03-23 ENCOUNTER — OFFICE VISIT (OUTPATIENT)
Dept: FAMILY MEDICINE CLINIC | Facility: CLINIC | Age: 37
End: 2022-03-23
Payer: COMMERCIAL

## 2022-03-23 VITALS
TEMPERATURE: 98.1 F | HEART RATE: 67 BPM | SYSTOLIC BLOOD PRESSURE: 116 MMHG | WEIGHT: 215 LBS | BODY MASS INDEX: 33.67 KG/M2 | RESPIRATION RATE: 18 BRPM | DIASTOLIC BLOOD PRESSURE: 64 MMHG | OXYGEN SATURATION: 98 %

## 2022-03-23 DIAGNOSIS — R10.9 ABDOMINAL PAIN, UNSPECIFIED ABDOMINAL LOCATION: ICD-10-CM

## 2022-03-23 DIAGNOSIS — R11.2 INTRACTABLE VOMITING WITH NAUSEA, UNSPECIFIED VOMITING TYPE: Primary | ICD-10-CM

## 2022-03-23 DIAGNOSIS — F32.A DEPRESSION, UNSPECIFIED DEPRESSION TYPE: ICD-10-CM

## 2022-03-23 DIAGNOSIS — R11.0 NAUSEA: ICD-10-CM

## 2022-03-23 LAB
SL AMB  POCT GLUCOSE, UA: NORMAL
SL AMB LEUKOCYTE ESTERASE,UA: 75
SL AMB POCT BILIRUBIN,UA: NORMAL
SL AMB POCT BLOOD,UA: 250
SL AMB POCT CLARITY,UA: CLEAR
SL AMB POCT COLOR,UA: NORMAL
SL AMB POCT KETONES,UA: 15
SL AMB POCT NITRITE,UA: NORMAL
SL AMB POCT PH,UA: 5
SL AMB POCT SPECIFIC GRAVITY,UA: 1.02
SL AMB POCT URINE HCG: NEGATIVE
SL AMB POCT URINE PROTEIN: NORMAL
SL AMB POCT UROBILINOGEN: NORMAL

## 2022-03-23 PROCEDURE — 81025 URINE PREGNANCY TEST: CPT | Performed by: FAMILY MEDICINE

## 2022-03-23 PROCEDURE — 81002 URINALYSIS NONAUTO W/O SCOPE: CPT | Performed by: FAMILY MEDICINE

## 2022-03-23 PROCEDURE — 3725F SCREEN DEPRESSION PERFORMED: CPT | Performed by: FAMILY MEDICINE

## 2022-03-23 PROCEDURE — 99213 OFFICE O/P EST LOW 20 MIN: CPT | Performed by: FAMILY MEDICINE

## 2022-03-23 RX ORDER — ONDANSETRON 4 MG/1
4 TABLET, FILM COATED ORAL EVERY 8 HOURS PRN
Qty: 20 TABLET | Refills: 0 | Status: SHIPPED | OUTPATIENT
Start: 2022-03-23 | End: 2022-04-12

## 2022-03-23 RX ORDER — SERTRALINE HYDROCHLORIDE 25 MG/1
50 TABLET, FILM COATED ORAL
Qty: 30 TABLET | Refills: 1 | Status: SHIPPED | OUTPATIENT
Start: 2022-03-23 | End: 2022-05-03

## 2022-03-23 RX ORDER — FAMOTIDINE 20 MG/1
20 TABLET, FILM COATED ORAL 2 TIMES DAILY
Qty: 60 TABLET | Refills: 1 | Status: SHIPPED | OUTPATIENT
Start: 2022-03-23

## 2022-03-23 RX ORDER — SERTRALINE HYDROCHLORIDE 25 MG/1
25 TABLET, FILM COATED ORAL DAILY
Qty: 30 TABLET | Refills: 1 | Status: SHIPPED | OUTPATIENT
Start: 2022-03-23 | End: 2022-03-23
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
## 2022-03-24 LAB
ALBUMIN SERPL-MCNC: 4.5 G/DL (ref 3.8–4.8)
ALBUMIN/GLOB SERPL: 1.9 {RATIO} (ref 1.2–2.2)
ALP SERPL-CCNC: 53 IU/L (ref 44–121)
ALT SERPL-CCNC: 12 IU/L (ref 0–32)
AST SERPL-CCNC: 17 IU/L (ref 0–40)
BASOPHILS # BLD AUTO: 0.1 X10E3/UL (ref 0–0.2)
BASOPHILS NFR BLD AUTO: 1 %
BILIRUB SERPL-MCNC: 0.3 MG/DL (ref 0–1.2)
BUN SERPL-MCNC: 10 MG/DL (ref 6–20)
BUN/CREAT SERPL: 11 (ref 9–23)
CALCIUM SERPL-MCNC: 9.6 MG/DL (ref 8.7–10.2)
CHLORIDE SERPL-SCNC: 106 MMOL/L (ref 96–106)
CO2 SERPL-SCNC: 19 MMOL/L (ref 20–29)
CREAT SERPL-MCNC: 0.94 MG/DL (ref 0.57–1)
EGFR: 80 ML/MIN/1.73
EOSINOPHIL # BLD AUTO: 0.1 X10E3/UL (ref 0–0.4)
EOSINOPHIL NFR BLD AUTO: 2 %
ERYTHROCYTE [DISTWIDTH] IN BLOOD BY AUTOMATED COUNT: 13.4 % (ref 11.7–15.4)
GLOBULIN SER-MCNC: 2.4 G/DL (ref 1.5–4.5)
GLUCOSE SERPL-MCNC: 89 MG/DL (ref 65–99)
HCT VFR BLD AUTO: 43.5 % (ref 34–46.6)
HGB BLD-MCNC: 15 G/DL (ref 11.1–15.9)
IMM GRANULOCYTES # BLD: 0 X10E3/UL (ref 0–0.1)
IMM GRANULOCYTES NFR BLD: 0 %
LYMPHOCYTES # BLD AUTO: 1.6 X10E3/UL (ref 0.7–3.1)
LYMPHOCYTES NFR BLD AUTO: 29 %
MCH RBC QN AUTO: 32.8 PG (ref 26.6–33)
MCHC RBC AUTO-ENTMCNC: 34.5 G/DL (ref 31.5–35.7)
MCV RBC AUTO: 95 FL (ref 79–97)
MONOCYTES # BLD AUTO: 0.5 X10E3/UL (ref 0.1–0.9)
MONOCYTES NFR BLD AUTO: 9 %
NEUTROPHILS # BLD AUTO: 3.3 X10E3/UL (ref 1.4–7)
NEUTROPHILS NFR BLD AUTO: 59 %
PLATELET # BLD AUTO: 307 X10E3/UL (ref 150–450)
POTASSIUM SERPL-SCNC: 4.5 MMOL/L (ref 3.5–5.2)
PROT SERPL-MCNC: 6.9 G/DL (ref 6–8.5)
RBC # BLD AUTO: 4.57 X10E6/UL (ref 3.77–5.28)
SODIUM SERPL-SCNC: 140 MMOL/L (ref 134–144)
TSH SERPL DL<=0.005 MIU/L-ACNC: 2.07 UIU/ML (ref 0.45–4.5)
WBC # BLD AUTO: 5.5 X10E3/UL (ref 3.4–10.8)

## 2022-03-25 ENCOUNTER — TELEPHONE (OUTPATIENT)
Dept: PEDIATRIC CARDIOLOGY | Facility: CLINIC | Age: 37
End: 2022-03-25

## 2022-03-25 DIAGNOSIS — Z65.9 SOCIAL PROBLEM: Primary | ICD-10-CM

## 2022-03-25 LAB
BACTERIA UR CULT: NORMAL
Lab: NO GROWTH

## 2022-03-30 ENCOUNTER — PATIENT OUTREACH (OUTPATIENT)
Dept: FAMILY MEDICINE CLINIC | Facility: CLINIC | Age: 37
End: 2022-03-30
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
## 2022-04-05 ENCOUNTER — PATIENT OUTREACH (OUTPATIENT)
Dept: FAMILY MEDICINE CLINIC | Facility: CLINIC | Age: 37
End: 2022-04-05

## 2022-04-11 DIAGNOSIS — R11.2 INTRACTABLE VOMITING WITH NAUSEA: ICD-10-CM

## 2022-04-12 RX ORDER — ONDANSETRON 4 MG/1
TABLET, FILM COATED ORAL
Qty: 20 TABLET | Refills: 0 | Status: SHIPPED | OUTPATIENT
Start: 2022-04-12

## 2022-04-14 ENCOUNTER — PATIENT OUTREACH (OUTPATIENT)
Dept: FAMILY MEDICINE CLINIC | Facility: CLINIC | Age: 37
End: 2022-04-14

## 2022-04-14 NOTE — PROGRESS NOTES
SW emailed pt to follow up on receipt of resources, and informed pt that if she has additional needs she may contact [address]  Episode closed and this SW removed self from care team      Note routed to Sangeeta Reyes

## 2022-05-03 DIAGNOSIS — F32.A DEPRESSION, UNSPECIFIED DEPRESSION TYPE: ICD-10-CM

## 2022-05-03 RX ORDER — SERTRALINE HYDROCHLORIDE 100 MG/1
100 TABLET, FILM COATED ORAL
Qty: 60 TABLET | Refills: 0 | Status: SHIPPED | OUTPATIENT
Start: 2022-05-03 | End: 2022-07-07 | Stop reason: SDUPTHER

## 2022-05-07 ENCOUNTER — HOSPITAL ENCOUNTER (EMERGENCY)
Facility: HOSPITAL | Age: 37
Discharge: HOME | End: 2022-05-07
Attending: EMERGENCY MEDICINE | Admitting: EMERGENCY MEDICINE
Payer: COMMERCIAL

## 2022-05-07 VITALS
RESPIRATION RATE: 20 BRPM | DIASTOLIC BLOOD PRESSURE: 94 MMHG | OXYGEN SATURATION: 98 % | SYSTOLIC BLOOD PRESSURE: 170 MMHG | TEMPERATURE: 99.1 F | HEART RATE: 77 BPM

## 2022-05-07 DIAGNOSIS — S61.219A FINGER LACERATION: Primary | ICD-10-CM

## 2022-05-07 PROCEDURE — 90471 IMMUNIZATION ADMIN: CPT

## 2022-05-07 PROCEDURE — 12001 RPR S/N/AX/GEN/TRNK 2.5CM/<: CPT | Performed by: PHYSICIAN ASSISTANT

## 2022-05-07 PROCEDURE — 99283 EMERGENCY DEPT VISIT LOW MDM: CPT

## 2022-05-07 PROCEDURE — 90715 TDAP VACCINE 7 YRS/> IM: CPT | Performed by: PHYSICIAN ASSISTANT

## 2022-05-07 PROCEDURE — 99282 EMERGENCY DEPT VISIT SF MDM: CPT | Performed by: PHYSICIAN ASSISTANT

## 2022-05-07 RX ADMIN — TETANUS TOXOID, REDUCED DIPHTHERIA TOXOID AND ACELLULAR PERTUSSIS VACCINE, ADSORBED 0.5 ML: 5; 2.5; 8; 8; 2.5 SUSPENSION INTRAMUSCULAR at 14:01

## 2022-05-07 NOTE — ED PROVIDER NOTES
History  Chief Complaint   Patient presents with    Finger Injury     Avulsion dist L 4th finger, bleeding controlled  Patient reports reaching in a drawer and hitting her finger on a razer blade  Unknown if tetanus is UTD     29-year-old female presenting today with a left 4th finger distal laceration that occurred prior to arrival   States that she accidentally struck her finger on a razor after reaching into a drawer  States the leg quite a bit and had difficult time controlling the bleeding  She is on any blood thinners  Does not recall last tetanus  Denies numbness, paresthesias, weakness  Prior to Admission Medications   Prescriptions Last Dose Informant Patient Reported? Taking? albuterol (2 5 mg/3 mL) 0 083 % nebulizer solution   Yes No   Sig: Take 2 5 mg by nebulization 2 (two) times a day     Patient not taking: Reported on 2021   famotidine (PEPCID) 20 mg tablet   No No   Sig: Take 1 tablet (20 mg total) by mouth 2 (two) times a day   ondansetron (ZOFRAN) 4 mg tablet   No No   Sig: TAKE 1 TABLET BY MOUTH EVERY 8 HOURS AS NEEDED FOR NAUSEA FOR VOMITING   sertraline (Zoloft) 100 mg tablet   No No   Sig: Take 1 tablet (100 mg total) by mouth daily at bedtime      Facility-Administered Medications: None       Past Medical History:   Diagnosis Date    Asthma     COPD (chronic obstructive pulmonary disease) (HCC)     Macular degeneration     right eye    Periodontitis     Psychiatric disorder     depression, anxiety       Past Surgical History:   Procedure Laterality Date     SECTION      TONSILECTOMY AND ADNOIDECTOMY      TUBAL LIGATION         Family History   Problem Relation Age of Onset    Hypertension Mother    [facility] Hyperlipidemia Mother    [facility] Arthritis Mother     Diabetes Mother     Asthma Mother     Thyroid disease Mother     Diabetes Father     Hypertension Father     Thyroid disease Sister     Diabetes Sister     Thyroid disease Daughter     Hyperlipidemia Daughter     Asthma Daughter      I have reviewed and agree with the history as documented  E-Cigarette/Vaping     E-Cigarette/Vaping Substances    Nicotine No     THC No     CBD No     Flavoring No     Other No     Unknown No      Social History     Tobacco Use    Smoking status: Current Every Day Smoker     Packs/day: 0 50     Years: 25 00     Pack years: 12 50     Types: Cigarettes    Smokeless tobacco: Never Used   Vaping Use    Vaping Use: Not on file   Substance Use Topics    Alcohol use: No     Comment: socially /  never per Allscripts    Drug use: No       Review of Systems   Constitutional: Negative  Negative for chills, fatigue and fever  HENT: Negative  Negative for congestion, postnasal drip, rhinorrhea and sore throat  Eyes: Negative  Respiratory: Negative  Negative for cough, shortness of breath and wheezing  Cardiovascular: Negative  Gastrointestinal: Negative  Negative for abdominal pain, diarrhea, nausea and vomiting  Endocrine: Negative  Genitourinary: Negative  Musculoskeletal: Negative  Skin: Positive for wound  Negative for color change, pallor and rash  Neurological: Negative  Hematological: Negative  Psychiatric/Behavioral: Negative  All other systems reviewed and are negative  Physical Exam  Physical Exam  Vitals and nursing note reviewed  Constitutional:       Appearance: Normal appearance  HENT:      Head: Normocephalic and atraumatic  Right Ear: External ear normal       Left Ear: External ear normal       Nose: Nose normal    Eyes:      Conjunctiva/sclera: Conjunctivae normal    Cardiovascular:      Rate and Rhythm: Normal rate  Pulses: Normal pulses  Pulmonary:      Effort: Pulmonary effort is normal    Abdominal:      General: There is no distension  Musculoskeletal:         General: No deformity  Normal range of motion  Cervical back: Normal range of motion  Skin:     General: Skin is warm and dry  Capillary Refill: Capillary refill takes less than 2 seconds  Findings: No rash  Neurological:      General: No focal deficit present  Mental Status: She is alert and oriented to person, place, and time  Mental status is at baseline  Psychiatric:         Mood and Affect: Mood normal          Behavior: Behavior normal          Thought Content: Thought content normal          Judgment: Judgment normal          Vital Signs  ED Triage Vitals [05/07/22 1312]   Temperature Pulse Respirations Blood Pressure SpO2   99 1 °F (37 3 °C) 77 20 170/94 98 %      Temp Source Heart Rate Source Patient Position - Orthostatic VS BP Location FiO2 (%)   Tympanic Monitor Sitting Right arm --      Pain Score       9           Vitals:    05/07/22 1312   BP: 170/94   Pulse: 77   Patient Position - Orthostatic VS: Sitting         Visual Acuity      ED Medications  Medications   tetanus-diphtheria-acellular pertussis (BOOSTRIX) IM injection 0 5 mL (0 5 mL Intramuscular Given 5/7/22 1401)       Diagnostic Studies  Results Reviewed     None                 No orders to display              Procedures  Laceration repair    Date/Time: 5/7/2022 2:22 PM  Performed by: Raghu Marin PA-C  Authorized by: Raghu Marin PA-C   Consent: Verbal consent obtained  Risks and benefits: risks, benefits and alternatives were discussed  Consent given by: patient  Patient understanding: patient states understanding of the procedure being performed  Patient consent: the patient's understanding of the procedure matches consent given  Procedure consent: procedure consent matches procedure scheduled  Relevant documents: relevant documents present and verified  Test results: test results available and properly labeled  Site marked: the operative site was marked  Radiology Images displayed and confirmed   If images not available, report reviewed: imaging studies available  Required items: required blood products, implants, devices, and special equipment available  Patient identity confirmed: verbally with patient  Time out: Immediately prior to procedure a "time out" was called to verify the correct patient, procedure, equipment, support staff and site/side marked as required  Body area: upper extremity  Location details: left ring finger  Laceration length: 0 5 cm  Foreign bodies: no foreign bodies  Tendon involvement: none  Nerve involvement: none    Wound Dehiscence:  Superficial Wound Dehiscence: simple closure      Procedure Details:  Preparation: Patient was prepped and draped in the usual sterile fashion  Irrigation solution: saline  Debridement: none  Degree of undermining: none  Skin closure: glue  Approximation: close  Approximation difficulty: simple  Patient tolerance: patient tolerated the procedure well with no immediate complications               ED Course                                             MDM  Number of Diagnoses or Management Options  Finger laceration  Diagnosis management comments: Patient was updated on tetanus, glue was placed over the wound, no further bleeding  Bulky gauze was placed, given education regarding wound care  Patient is informed to return to the emergency department for worsening of symptoms and was given proper education regarding their diagnosis and symptoms  Otherwise the patient is informed to follow up with their primary care doctor for re-evaluation  The patient verbalizes understanding and agrees with above assessment and plan  All questions were answered  Please Note: Fluency Direct voice recognition software may have been used in the creation of this document  Wrong words or sound a like substitutions may have occurred due to the inherent limitations of the voice software             Amount and/or Complexity of Data Reviewed  Review and summarize past medical records: yes  Independent visualization of images, tracings, or specimens: yes        Disposition  Final diagnoses: Finger laceration     Time reflects when diagnosis was documented in both MDM as applicable and the Disposition within this note     Time User Action Codes Description Comment    5/7/2022  1:43 PM Andreas Chacko Add [R04 [id number]] Finger laceration       ED Disposition     ED Disposition Condition Date/Time Comment    Discharge Stable Sat May 7, 2022  1:43 PM iWlma Weiss discharge to home/self care  Follow-up Information     Follow up With Specialties Details Why [address] Emergency Department Emergency Medicine Go to  If symptoms worsen, otherwise please follow up with your family doctor [address] [place] [address] [address] [address] [address] [address] Emergency Department, [place], [place], [address]          Discharge Medication List as of 5/7/2022  1:44 PM      CONTINUE these medications which have NOT CHANGED    Details   albuterol (2 5 mg/3 mL) 0 083 % nebulizer solution Take 2 5 mg by nebulization 2 (two) times a day , Until Discontinued, Historical Med      famotidine (PEPCID) 20 mg tablet Take 1 tablet (20 mg total) by mouth 2 (two) times a day, Starting Wed 3/23/2022, Normal      ondansetron (ZOFRAN) 4 mg tablet TAKE 1 TABLET BY MOUTH EVERY 8 HOURS AS NEEDED FOR NAUSEA FOR VOMITING, Normal      sertraline (Zoloft) 100 mg tablet Take 1 tablet (100 mg total) by mouth daily at bedtime, Starting Tue 5/3/2022, Normal             No discharge procedures on file      PDMP Review     None          ED Provider  Electronically Signed by           Aviva Wilkinson PA-C  05/07/22 6092
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
## 2022-06-17 ENCOUNTER — HOSPITAL ENCOUNTER (EMERGENCY)
Facility: HOSPITAL | Age: 37
Discharge: HOME | End: 2022-06-17
Attending: EMERGENCY MEDICINE | Admitting: EMERGENCY MEDICINE
Payer: COMMERCIAL

## 2022-06-17 VITALS
HEART RATE: 78 BPM | RESPIRATION RATE: 16 BRPM | OXYGEN SATURATION: 98 % | SYSTOLIC BLOOD PRESSURE: 168 MMHG | DIASTOLIC BLOOD PRESSURE: 91 MMHG | TEMPERATURE: 99.1 F

## 2022-06-17 DIAGNOSIS — K08.89 TOOTHACHE: ICD-10-CM

## 2022-06-17 DIAGNOSIS — K04.7 DENTAL INFECTION: Primary | ICD-10-CM

## 2022-06-17 PROCEDURE — 99284 EMERGENCY DEPT VISIT MOD MDM: CPT | Performed by: EMERGENCY MEDICINE

## 2022-06-17 PROCEDURE — 99282 EMERGENCY DEPT VISIT SF MDM: CPT

## 2022-06-17 RX ORDER — OXYCODONE HYDROCHLORIDE AND ACETAMINOPHEN 5; 325 MG/1; MG/1
1 TABLET ORAL ONCE
Status: COMPLETED | OUTPATIENT
Start: 2022-06-17 | End: 2022-06-17

## 2022-06-17 RX ORDER — IBUPROFEN 800 MG/1
TABLET ORAL EVERY 6 HOURS PRN
COMMUNITY

## 2022-06-17 RX ORDER — AMOXICILLIN AND CLAVULANATE POTASSIUM 875; 125 MG/1; MG/1
1 TABLET, FILM COATED ORAL EVERY 12 HOURS SCHEDULED
COMMUNITY

## 2022-06-17 RX ADMIN — OXYCODONE HYDROCHLORIDE AND ACETAMINOPHEN 1 TABLET: 5; 325 TABLET ORAL at 19:40

## 2022-06-17 NOTE — ED NOTES
Pt has been crying states that's she is in pain, requested MD to wtite prescription for percocet for dental pain  Pt made aware that she has to follow up with her dentist and oral surgeon information is provided  Pt is upset, 1 dose percocet given, pt reported that her sister is coming to get her  She insisted to ask for percocet prescription, pt made aware that she need to take motrin and tylenol and see dentist as soon as possible  Pt is so upset and stormed out the room, made aware that she can't leave since she was just given pain med  Pt said "I don't care"  Pt continues to walk out  Security made aware, reported that she is sitting on the curb outside, waiting for her ride  MD is aware        Luis Armando Olivares, RN  06/17/22 1955

## 2022-06-19 NOTE — ED PROVIDER NOTES
History  Chief Complaint   Patient presents with    Dental Pain     Seen by dentist yesterday, needs two teeth pulled, does not have the money put on ibuprofen and amoxacillin and nothing is helping thinks she is getting an abscess     Patient presents for evaluation of dental pain  Patient was seen by the dentist yesterday and was told that to the need to be pulled  However does not have a might states currently the have him hold  Was started on ibuprofen amoxicillin  Feels that nothing is helping her with the pain  History provided by:  Patient   used: No    Dental Pain  Associated symptoms: no fever        Prior to Admission Medications   Prescriptions Last Dose Informant Patient Reported? Taking? albuterol (2 5 mg/3 mL) 0 083 % nebulizer solution   Yes No   Sig: Take 2 5 mg by nebulization 2 (two) times a day     Patient not taking: Reported on 2021   amoxicillin-clavulanate (AUGMENTIN) 875-125 mg per tablet   Yes Yes   Sig: Take 1 tablet by mouth every 12 (twelve) hours   famotidine (PEPCID) 20 mg tablet   No No   Sig: Take 1 tablet (20 mg total) by mouth 2 (two) times a day   ibuprofen (MOTRIN) 800 mg tablet   Yes Yes   Sig: Take by mouth every 6 (six) hours as needed for mild pain   ondansetron (ZOFRAN) 4 mg tablet   No No   Sig: TAKE 1 TABLET BY MOUTH EVERY 8 HOURS AS NEEDED FOR NAUSEA FOR VOMITING   sertraline (Zoloft) 100 mg tablet   No No   Sig: Take 1 tablet (100 mg total) by mouth daily at bedtime      Facility-Administered Medications: None       Past Medical History:   Diagnosis Date    Asthma     COPD (chronic obstructive pulmonary disease) (HCC)     Macular degeneration     right eye    Periodontitis     Psychiatric disorder     depression, anxiety       Past Surgical History:   Procedure Laterality Date     SECTION      TONSILECTOMY AND ADNOIDECTOMY      TUBAL LIGATION         Family History   Problem Relation Age of Onset    Hypertension Mother     Hyperlipidemia Mother    Fernando Bones Arthritis Mother     Diabetes Mother     Asthma Mother     Thyroid disease Mother     Diabetes Father     Hypertension Father     Thyroid disease Sister     Diabetes Sister     Thyroid disease Daughter     Hyperlipidemia Daughter     Asthma Daughter      I have reviewed and agree with the history as documented  E-Cigarette/Vaping     E-Cigarette/Vaping Substances    Nicotine No     THC No     CBD No     Flavoring No     Other No     Unknown No      Social History     Tobacco Use    Smoking status: Current Every Day Smoker     Packs/day: 0 50     Years: 25 00     Pack years: 12 50     Types: Cigarettes    Smokeless tobacco: Never Used   Substance Use Topics    Alcohol use: No     Comment: socially /  never per Allscripts    Drug use: No       Review of Systems   Constitutional: Negative for chills and fever  HENT: Positive for dental problem  Negative for ear pain and sore throat  Eyes: Negative for pain and visual disturbance  Respiratory: Negative for cough and shortness of breath  Cardiovascular: Negative for chest pain and palpitations  Gastrointestinal: Negative for abdominal pain and vomiting  Genitourinary: Negative for dysuria and hematuria  Musculoskeletal: Negative for arthralgias and back pain  Skin: Negative for color change and rash  Neurological: Negative for seizures and syncope  All other systems reviewed and are negative  Physical Exam  Physical Exam  Vitals and nursing note reviewed  Constitutional:       General: She is not in acute distress  HENT:      Head: Atraumatic  Right Ear: External ear normal       Left Ear: External ear normal       Nose: Nose normal       Mouth/Throat:      Mouth: Mucous membranes are moist       Dentition: Abnormal dentition  Dental tenderness and dental caries present  Pharynx: Oropharynx is clear  Uvula midline  No uvula swelling        Tonsils: 0 on the right  0 on the left  Cardiovascular:      Rate and Rhythm: Normal rate and regular rhythm  Pulmonary:      Effort: Pulmonary effort is normal  No respiratory distress  Breath sounds: Normal breath sounds  Musculoskeletal:         General: No deformity  Normal range of motion  Skin:     Capillary Refill: Capillary refill takes less than 2 seconds  Findings: No rash  Neurological:      General: No focal deficit present  Mental Status: She is alert and oriented to person, place, and time  Vital Signs  ED Triage Vitals [06/17/22 1921]   Temperature Pulse Respirations Blood Pressure SpO2   99 1 °F (37 3 °C) 78 16 168/91 98 %      Temp Source Heart Rate Source Patient Position - Orthostatic VS BP Location FiO2 (%)   Tympanic Monitor Sitting Right arm --      Pain Score       10 - Worst Possible Pain           Vitals:    06/17/22 1921   BP: 168/91   Pulse: 78   Patient Position - Orthostatic VS: Sitting         Visual Acuity      ED Medications  Medications   oxyCODONE-acetaminophen (PERCOCET) 5-325 mg per tablet 1 tablet (1 tablet Oral Given 6/17/22 1940)       Diagnostic Studies  Results Reviewed     None                 No orders to display              Procedures  Procedures         ED Course                               SBIRT 22yo+    Flowsheet Row Most Recent Value   SBIRT (25 yo +)    In order to provide better care to our patients, we are screening all of our patients for alcohol and drug use  Would it be okay to ask you these screening questions? No Filed at: 06/17/2022 1926                    MDM  Number of Diagnoses or Management Options  Dental infection  Toothache  Diagnosis management comments: Pulse ox 98% on room air indicating adequate oxygenation  Refer to dental clinic         Amount and/or Complexity of Data Reviewed  Decide to obtain previous medical records or to obtain history from someone other than the patient: yes  Review and summarize past medical records: yes    Patient Progress  Patient progress: stable      Disposition  Final diagnoses:   Dental infection   Toothache     Time reflects when diagnosis was documented in both MDM as applicable and the Disposition within this note     Time User Action Codes Description Comment    6/17/2022  7:37 PM Anali Barakat Add [K04 7] Dental infection     6/17/2022  7:37 PM Benito Clementeoralia Brenton Add [K08 89] Toothache       ED Disposition     ED Disposition   Discharge    Condition   Stable    Date/Time   Fri Jun 17, 2022  7:37 PM    Comment   Wilma Weiss discharge to home/self care  Follow-up Information     Follow up With Specialties Details Why Contact Info    dentist  In 1 week            Discharge Medication List as of 6/17/2022  7:37 PM      CONTINUE these medications which have NOT CHANGED    Details   amoxicillin-clavulanate (AUGMENTIN) 875-125 mg per tablet Take 1 tablet by mouth every 12 (twelve) hours, Historical Med      ibuprofen (MOTRIN) 800 mg tablet Take by mouth every 6 (six) hours as needed for mild pain, Historical Med      albuterol (2 5 mg/3 mL) 0 083 % nebulizer solution Take 2 5 mg by nebulization 2 (two) times a day , Until Discontinued, Historical Med      famotidine (PEPCID) 20 mg tablet Take 1 tablet (20 mg total) by mouth 2 (two) times a day, Starting Wed 3/23/2022, Normal      ondansetron (ZOFRAN) 4 mg tablet TAKE 1 TABLET BY MOUTH EVERY 8 HOURS AS NEEDED FOR NAUSEA FOR VOMITING, Normal      sertraline (Zoloft) 100 mg tablet Take 1 tablet (100 mg total) by mouth daily at bedtime, Starting Tue 5/3/2022, Normal             No discharge procedures on file      PDMP Review     None          ED Provider  Electronically Signed by           Aaron Bull DO  06/19/22 1081

## 2022-07-07 DIAGNOSIS — F32.A DEPRESSION, UNSPECIFIED DEPRESSION TYPE: ICD-10-CM

## 2022-07-08 RX ORDER — SERTRALINE HYDROCHLORIDE 100 MG/1
100 TABLET, FILM COATED ORAL
Qty: 60 TABLET | Refills: 0 | Status: SHIPPED | OUTPATIENT
Start: 2022-07-08 | End: 2022-09-01

## 2022-08-28 ENCOUNTER — APPOINTMENT (OUTPATIENT)
Dept: RADIOLOGY | Facility: HOSPITAL | Age: 37
End: 2022-08-28
Payer: COMMERCIAL

## 2022-08-28 ENCOUNTER — HOSPITAL ENCOUNTER (EMERGENCY)
Facility: HOSPITAL | Age: 37
Discharge: HOME | End: 2022-08-28
Attending: EMERGENCY MEDICINE
Payer: COMMERCIAL

## 2022-08-28 VITALS
RESPIRATION RATE: 20 BRPM | SYSTOLIC BLOOD PRESSURE: 148 MMHG | DIASTOLIC BLOOD PRESSURE: 71 MMHG | WEIGHT: 210.4 LBS | TEMPERATURE: 99.3 F | OXYGEN SATURATION: 98 % | HEART RATE: 70 BPM | BODY MASS INDEX: 32.95 KG/M2

## 2022-08-28 DIAGNOSIS — S32.2XXA COCCYGEAL FRACTURE (HCC): Primary | ICD-10-CM

## 2022-08-28 PROCEDURE — 99284 EMERGENCY DEPT VISIT MOD MDM: CPT | Performed by: EMERGENCY MEDICINE

## 2022-08-28 PROCEDURE — 72100 X-RAY EXAM L-S SPINE 2/3 VWS: CPT

## 2022-08-28 PROCEDURE — 72220 X-RAY EXAM SACRUM TAILBONE: CPT

## 2022-08-28 PROCEDURE — 99283 EMERGENCY DEPT VISIT LOW MDM: CPT

## 2022-08-28 RX ORDER — IBUPROFEN 600 MG/1
600 TABLET ORAL EVERY 6 HOURS PRN
Qty: 30 TABLET | Refills: 0 | Status: SHIPPED | OUTPATIENT
Start: 2022-08-28 | End: 2022-09-29

## 2022-08-28 RX ORDER — DIAZEPAM 5 MG/1
5 TABLET ORAL ONCE
Status: COMPLETED | OUTPATIENT
Start: 2022-08-28 | End: 2022-08-28

## 2022-08-28 RX ORDER — ACETAMINOPHEN 325 MG/1
975 TABLET ORAL ONCE
Status: COMPLETED | OUTPATIENT
Start: 2022-08-28 | End: 2022-08-28

## 2022-08-28 RX ORDER — LIDOCAINE 50 MG/G
1 PATCH TOPICAL ONCE
Status: DISCONTINUED | OUTPATIENT
Start: 2022-08-28 | End: 2022-08-28 | Stop reason: HOSPADM

## 2022-08-28 RX ORDER — TRAMADOL HYDROCHLORIDE 50 MG/1
50 TABLET ORAL EVERY 6 HOURS PRN
Qty: 10 TABLET | Refills: 0 | Status: SHIPPED | OUTPATIENT
Start: 2022-08-28 | End: 2022-09-07

## 2022-08-28 RX ADMIN — ACETAMINOPHEN 975 MG: 325 TABLET, FILM COATED ORAL at 13:44

## 2022-08-28 RX ADMIN — DIAZEPAM 5 MG: 5 TABLET ORAL at 13:44

## 2022-08-28 RX ADMIN — LIDOCAINE 5% 1 PATCH: 700 PATCH TOPICAL at 13:45

## 2022-08-28 NOTE — ED PROVIDER NOTES
History  Chief Complaint   Patient presents with    Fall     States yesterday she fell backwards while rollerskating , " I landed on my tailbone and heard a pop and I immediately got numbness the back of my legs going to my knees "  Crying, states took Ibuprofen 5 tablets at 8 am  States she already has back problems " I have sciatica "  37yoF hx asthma, anxiety c/o falling onto her backside while roller-skating and has severe tailbone pain  No LE weakness or bowel/bladder changes  Prior to Admission Medications   Prescriptions Last Dose Informant Patient Reported? Taking? albuterol (2 5 mg/3 mL) 0 083 % nebulizer solution   Yes No   Sig: Take 2 5 mg by nebulization 2 (two) times a day     Patient not taking: Reported on 2021   famotidine (PEPCID) 20 mg tablet   No No   Sig: Take 1 tablet (20 mg total) by mouth 2 (two) times a day   ondansetron (ZOFRAN) 4 mg tablet   No No   Sig: TAKE 1 TABLET BY MOUTH EVERY 8 HOURS AS NEEDED FOR NAUSEA FOR VOMITING   sertraline (Zoloft) 100 mg tablet   No No   Sig: Take 1 tablet (100 mg total) by mouth daily at bedtime      Facility-Administered Medications: None       Past Medical History:   Diagnosis Date    Asthma     COPD (chronic obstructive pulmonary disease) (HCC)     Macular degeneration     right eye    Periodontitis     Psychiatric disorder     depression, anxiety       Past Surgical History:   Procedure Laterality Date     SECTION      TONSILECTOMY AND ADNOIDECTOMY      TUBAL LIGATION         Family History   Problem Relation Age of Onset    Hypertension Mother     Hyperlipidemia Mother    Guillermo [place] Arthritis Mother     Diabetes Mother     Asthma Mother     Thyroid disease Mother     Diabetes Father     Hypertension Father     Thyroid disease Sister     Diabetes Sister     Thyroid disease Daughter     Hyperlipidemia Daughter     Asthma Daughter      I have reviewed and agree with the history as documented  E-Cigarette/Vaping    E-Cigarette Use Never User      E-Cigarette/Vaping Substances    Nicotine No     THC No     CBD No     Flavoring No     Other No     Unknown No      Social History     Tobacco Use    Smoking status: Current Every Day Smoker     Packs/day: 0 50     Years: 25 00     Pack years: 12 50     Types: Cigarettes    Smokeless tobacco: Never Used   Vaping Use    Vaping Use: Never used   Substance Use Topics    Alcohol use: No     Comment: socially /  never per [employer]    Drug use: No       Review of Systems   Neurological: Negative for weakness and numbness  All other systems reviewed and are negative  Physical Exam  Physical Exam  Vitals reviewed  Constitutional:       Appearance: She is well-developed  HENT:      Head: Normocephalic and atraumatic  Eyes:      Conjunctiva/sclera: Conjunctivae normal    Cardiovascular:      Rate and Rhythm: Normal rate and regular rhythm  Pulmonary:      Effort: No respiratory distress  Abdominal:      General: There is no distension  Musculoskeletal:         General: No deformity  Normal range of motion  Comments: +ttp lower lumbar and coccygeal area   Skin:     Findings: No rash  Neurological:      Mental Status: She is alert  Sensory: No sensory deficit        Gait: Gait normal          Vital Signs  ED Triage Vitals [08/28/22 1306]   Temperature Pulse Respirations Blood Pressure SpO2   99 3 °F (37 4 °C) 70 20 148/71 98 %      Temp Source Heart Rate Source Patient Position - Orthostatic VS BP Location FiO2 (%)   Tympanic Monitor Sitting Left arm --      Pain Score       10 - Worst Possible Pain           Vitals:    08/28/22 1306   BP: 148/71   Pulse: 70   Patient Position - Orthostatic VS: Sitting         Visual Acuity      ED Medications  Medications   lidocaine (LIDODERM) 5 % patch 1 patch (1 patch Topical Medication Applied 8/28/22 1345)   diazepam (VALIUM) tablet 5 mg (5 mg Oral Given 8/28/22 1344) acetaminophen (TYLENOL) tablet 975 mg (975 mg Oral Given 8/28/22 1344)       Diagnostic Studies  Results Reviewed     None                 XR lumbar spine 2 or 3 views    (Results Pending)   XR sacrum and coccyx    (Results Pending)              Procedures  Procedures         ED Course                               SBIRT 22yo+    Flowsheet Row Most Recent Value   SBIRT (25 yo +)    In order to provide better care to our patients, we are screening all of our patients for alcohol and drug use  Would it be okay to ask you these screening questions? No Filed at: 08/28/2022 1359                    MDM  Number of Diagnoses or Management Options  Coccygeal fracture [facility])  Diagnosis management comments: +Coccygeal fx on my read  Supportive care, prn ortho fu        Disposition  Final diagnoses:   Coccygeal fracture [facility])     Time reflects when diagnosis was documented in both MDM as applicable and the Disposition within this note     Time User Action Codes Description Comment    8/28/2022  3:16 PM Pavithra Yamilexcresencio Leon [S32  2XXA] Coccygeal fracture [facility])       ED Disposition     ED Disposition   Discharge    Condition   Stable    Date/Time   Sun Aug 28, 2022 [address]  discharge to home/self care                 Follow-up Information     Follow up With Specialties Details [address] , DO Orthopedic Surgery  As needed, If symptoms worsen [address]  [phone]            Patient's Medications   Discharge Prescriptions    IBUPROFEN (MOTRIN) 600 MG TABLET    Take 1 tablet (600 mg total) by mouth every 6 (six) hours as needed for mild pain for up to 10 days       Start Date: 8/28/2022 End Date: 9/7/2022       Order Dose: 600 mg       Quantity: 30 tablet    Refills: 0    TRAMADOL (ULTRAM) 50 MG TABLET    Take 1 tablet (50 mg total) by mouth every 6 (six) hours as needed for severe pain for up to 10 days       Start Date: 8/28/2022 End Date: 9/7/2022       Order Dose: 50 mg       Quantity: 10 tablet    Refills: 0       No discharge procedures on file      PDMP Review     None          ED Provider  Electronically Signed by           Ramy Oliva DO  08/28/22 5641

## 2022-08-30 DIAGNOSIS — F32.A DEPRESSION, UNSPECIFIED DEPRESSION TYPE: ICD-10-CM

## 2022-09-01 RX ORDER — SERTRALINE HYDROCHLORIDE 100 MG/1
TABLET, FILM COATED ORAL
Qty: 60 TABLET | Refills: 0 | Status: SHIPPED | OUTPATIENT
Start: 2022-09-01

## 2022-09-29 ENCOUNTER — OFFICE VISIT (OUTPATIENT)
Dept: FAMILY MEDICINE CLINIC | Facility: CLINIC | Age: 37
End: 2022-09-29
Payer: COMMERCIAL

## 2022-09-29 VITALS
SYSTOLIC BLOOD PRESSURE: 114 MMHG | TEMPERATURE: 97.4 F | RESPIRATION RATE: 18 BRPM | HEART RATE: 69 BPM | HEIGHT: 67 IN | DIASTOLIC BLOOD PRESSURE: 62 MMHG | OXYGEN SATURATION: 99 % | BODY MASS INDEX: 33.95 KG/M2 | WEIGHT: 216.31 LBS

## 2022-09-29 DIAGNOSIS — Z00.00 MEDICARE ANNUAL WELLNESS VISIT, INITIAL: Primary | ICD-10-CM

## 2022-09-29 DIAGNOSIS — Z59.9 FINANCIAL DIFFICULTIES: ICD-10-CM

## 2022-09-29 DIAGNOSIS — F41.9 ANXIETY: ICD-10-CM

## 2022-09-29 DIAGNOSIS — Z23 ENCOUNTER FOR IMMUNIZATION: ICD-10-CM

## 2022-09-29 DIAGNOSIS — F33.1 MODERATE EPISODE OF RECURRENT MAJOR DEPRESSIVE DISORDER (HCC): ICD-10-CM

## 2022-09-29 PROCEDURE — 90677 PCV20 VACCINE IM: CPT

## 2022-09-29 PROCEDURE — G0438 PPPS, INITIAL VISIT: HCPCS | Performed by: FAMILY MEDICINE

## 2022-09-29 PROCEDURE — G0009 ADMIN PNEUMOCOCCAL VACCINE: HCPCS

## 2022-09-29 PROCEDURE — 3725F SCREEN DEPRESSION PERFORMED: CPT | Performed by: FAMILY MEDICINE

## 2022-09-29 RX ORDER — BUSPIRONE HYDROCHLORIDE 5 MG/1
5 TABLET ORAL 2 TIMES DAILY
Qty: 60 TABLET | Refills: 0 | Status: SHIPPED | OUTPATIENT
Start: 2022-09-29 | End: 2022-10-24

## 2022-09-29 SDOH — ECONOMIC STABILITY - INCOME SECURITY: PROBLEM RELATED TO HOUSING AND ECONOMIC CIRCUMSTANCES, UNSPECIFIED: Z59.9

## 2022-09-29 NOTE — PATIENT INSTRUCTIONS
Stop taking Ibuprofen   Start taking Tylenol 650mg Q6 hour  Stop taking benadryl and melatonin   Start taking buspar 5mg BID   Follow up with psychiatry   Follow up in 4 weeks

## 2022-09-30 ENCOUNTER — TELEPHONE (OUTPATIENT)
Dept: PSYCHIATRY | Facility: CLINIC | Age: 37
End: 2022-09-30

## 2022-09-30 NOTE — TELEPHONE ENCOUNTER
Called Patient in regards to routine referral and placing patient on proper wait list  LvM for patient to contact intake department if services are still needed

## 2022-10-05 ENCOUNTER — PATIENT OUTREACH (OUTPATIENT)
Dept: FAMILY MEDICINE CLINIC | Facility: CLINIC | Age: 37
End: 2022-10-05

## 2022-10-05 NOTE — TELEPHONE ENCOUNTER
Called Patient in regards to referral and placing patient on proper wait list  Unable to leave vm due "call could not be completed as dialed
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
## 2022-10-12 ENCOUNTER — TELEPHONE (OUTPATIENT)
Dept: PSYCHIATRY | Facility: CLINIC | Age: 37
End: 2022-10-12

## 2022-10-12 NOTE — TELEPHONE ENCOUNTER
left message for pt to return call to intake, reached out to pt in regards to scheduling services in [facility]

## 2022-10-21 DIAGNOSIS — F41.9 ANXIETY: ICD-10-CM

## 2022-10-23 NOTE — TELEPHONE ENCOUNTER
Dr Nancy Sheehan has been seeing patient for anxiety and prescribed this medication  Pt has follow up with Dr Nancy Sheehan next week as well  I will let her make the decision on wether this should be refilled now or at their follow up visit

## 2022-10-24 RX ORDER — BUSPIRONE HYDROCHLORIDE 5 MG/1
TABLET ORAL
Qty: 60 TABLET | Refills: 0 | Status: SHIPPED | OUTPATIENT
Start: 2022-10-24

## 2022-11-01 DIAGNOSIS — F32.A DEPRESSION, UNSPECIFIED DEPRESSION TYPE: ICD-10-CM

## 2022-11-01 RX ORDER — SERTRALINE HYDROCHLORIDE 100 MG/1
TABLET, FILM COATED ORAL
Qty: 60 TABLET | Refills: 0 | Status: SHIPPED | OUTPATIENT
Start: 2022-11-01

## 2022-11-26 DIAGNOSIS — F41.9 ANXIETY: ICD-10-CM

## 2022-11-30 RX ORDER — BUSPIRONE HYDROCHLORIDE 5 MG/1
TABLET ORAL
Qty: 60 TABLET | Refills: 0 | Status: SHIPPED | OUTPATIENT
Start: 2022-11-30

## 2022-12-26 DIAGNOSIS — F32.A DEPRESSION, UNSPECIFIED DEPRESSION TYPE: ICD-10-CM

## 2022-12-27 RX ORDER — SERTRALINE HYDROCHLORIDE 100 MG/1
TABLET, FILM COATED ORAL
Qty: 60 TABLET | Refills: 0 | Status: SHIPPED | OUTPATIENT
Start: 2022-12-27

## 2023-01-03 DIAGNOSIS — F41.9 ANXIETY: ICD-10-CM

## 2023-01-04 RX ORDER — BUSPIRONE HYDROCHLORIDE 5 MG/1
TABLET ORAL
Qty: 60 TABLET | Refills: 0 | Status: SHIPPED | OUTPATIENT
Start: 2023-01-04

## 2023-01-21 ENCOUNTER — APPOINTMENT (EMERGENCY)
Dept: RADIOLOGY | Facility: HOSPITAL | Age: 38
End: 2023-01-21

## 2023-01-21 ENCOUNTER — HOSPITAL ENCOUNTER (EMERGENCY)
Facility: HOSPITAL | Age: 38
Discharge: HOME | End: 2023-01-21
Attending: EMERGENCY MEDICINE

## 2023-01-21 ENCOUNTER — APPOINTMENT (OUTPATIENT)
Dept: RADIOLOGY | Facility: HOSPITAL | Age: 38
End: 2023-01-21

## 2023-01-21 VITALS
HEART RATE: 68 BPM | SYSTOLIC BLOOD PRESSURE: 160 MMHG | DIASTOLIC BLOOD PRESSURE: 92 MMHG | RESPIRATION RATE: 20 BRPM | OXYGEN SATURATION: 100 % | TEMPERATURE: 98 F

## 2023-01-21 DIAGNOSIS — S93.409A ANKLE SPRAIN: ICD-10-CM
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
## 2023-01-21 DIAGNOSIS — S63.259A DISLOCATION OF FINGER, INITIAL ENCOUNTER: ICD-10-CM

## 2023-01-21 RX ORDER — KETOROLAC TROMETHAMINE 30 MG/ML
30 INJECTION, SOLUTION INTRAMUSCULAR; INTRAVENOUS ONCE
Status: COMPLETED | OUTPATIENT
Start: 2023-01-21 | End: 2023-01-21

## 2023-01-21 RX ORDER — ACETAMINOPHEN 325 MG/1
975 TABLET ORAL ONCE
Status: COMPLETED | OUTPATIENT
Start: 2023-01-21 | End: 2023-01-21

## 2023-01-21 RX ADMIN — ACETAMINOPHEN 975 MG: 325 TABLET, FILM COATED ORAL at 16:09

## 2023-01-21 RX ADMIN — KETOROLAC TROMETHAMINE 30 MG: 30 INJECTION, SOLUTION INTRAMUSCULAR; INTRAVENOUS at 16:12

## 2023-01-22 NOTE — ED PROVIDER NOTES
History  Chief Complaint   Patient presents with   • Fall     States about an hour ago ankle rolled coming down step, c/o R ankle pain R little finger pain and L middle finger trying to catch her fall     37yoF slipped and fell on the stairs rolling her L ankle and landing on her R hand  C/o pain and deformity R 5th finger, L ankle pain  No head injury  Prior to Admission Medications   Prescriptions Last Dose Informant Patient Reported? Taking?    albuterol (2 5 mg/3 mL) 0 083 % nebulizer solution   Yes No   Sig: Take 2 5 mg by nebulization 2 (two) times a day   busPIRone (BUSPAR) 5 mg tablet   No No   Sig: TAKE 1 TABLET BY MOUTH TWICE A DAY   famotidine (PEPCID) 20 mg tablet   No No   Sig: Take 1 tablet (20 mg total) by mouth 2 (two) times a day   ibuprofen (MOTRIN) 600 mg tablet   No No   Sig: Take 1 tablet (600 mg total) by mouth every 6 (six) hours as needed for mild pain for up to 10 days   ondansetron (ZOFRAN) 4 mg tablet   No No   Sig: TAKE 1 TABLET BY MOUTH EVERY 8 HOURS AS NEEDED FOR NAUSEA FOR VOMITING   Patient not taking: Reported on 2022   sertraline (ZOLOFT) 100 mg tablet   No No   Sig: TAKE 1 TABLET BY MOUTH EVERYDAY AT BEDTIME      Facility-Administered Medications: None       Past Medical History:   Diagnosis Date   • Asthma    • COPD (chronic obstructive pulmonary disease) (HCC)    • Macular degeneration     right eye   • Periodontitis    • Psychiatric disorder     depression, anxiety       Past Surgical History:   Procedure Laterality Date   •  SECTION     • TONSILECTOMY AND ADNOIDECTOMY     • TUBAL LIGATION         Family History   Problem Relation Age of Onset   • Hypertension Mother    • Hyperlipidemia Mother    • Arthritis Mother    • Diabetes Mother    • Asthma Mother    • Thyroid disease Mother    • Diabetes Father    • Hypertension Father    • Thyroid disease Sister    • Diabetes Sister    • Thyroid disease Daughter    • Hyperlipidemia Daughter    • Asthma Daughter      I have reviewed and agree with the history as documented  E-Cigarette/Vaping   • E-Cigarette Use Never User      E-Cigarette/Vaping Substances   • Nicotine No    • THC No    • CBD No    • Flavoring No    • Other No    • Unknown No      Social History     Tobacco Use   • Smoking status: Every Day     Packs/day: 0 50     Years: 25 00     Pack years: 12 50     Types: Cigarettes   • Smokeless tobacco: Never   Vaping Use   • Vaping Use: Never used   Substance Use Topics   • Alcohol use: No     Comment: socially /  never per Allscripts   • Drug use: No       Review of Systems   Neurological: Negative for weakness  Physical Exam  Physical Exam  Vitals reviewed  Constitutional:       Appearance: She is well-developed  HENT:      Head: Normocephalic and atraumatic  Right Ear: External ear normal       Left Ear: External ear normal       Nose: Nose normal  No rhinorrhea  Mouth/Throat:      Mouth: Mucous membranes are moist    Eyes:      Conjunctiva/sclera: Conjunctivae normal    Cardiovascular:      Rate and Rhythm: Normal rate and regular rhythm  Pulmonary:      Effort: Pulmonary effort is normal       Breath sounds: Normal breath sounds  No wheezing or rales  Abdominal:      Palpations: Abdomen is soft  Tenderness: There is no abdominal tenderness  Musculoskeletal:      Cervical back: Neck supple  Right lower leg: No edema  Left lower leg: No edema  Comments: R 5th finger deviated 30 degrees radially without deformity or edema, nv intact  Mild edema L ankle   Skin:     General: Skin is warm and dry  Neurological:      Mental Status: She is alert and oriented to person, place, and time     Psychiatric:         Mood and Affect: Mood normal          Vital Signs  ED Triage Vitals [01/21/23 1506]   Temperature Pulse Respirations Blood Pressure SpO2   98 °F (36 7 °C) 68 20 160/92 100 %      Temp Source Heart Rate Source Patient Position - Orthostatic VS BP Location FiO2 (%)   Tympanic Monitor Sitting Right arm --      Pain Score       10 - Worst Possible Pain           Vitals:    01/21/23 1506   BP: 160/92   Pulse: 68   Patient Position - Orthostatic VS: Sitting         Visual Acuity      ED Medications  Medications   ketorolac (TORADOL) injection 30 mg (30 mg Intramuscular Given 1/21/23 1612)   acetaminophen (TYLENOL) tablet 975 mg (975 mg Oral Given 1/21/23 1609)       Diagnostic Studies  Results Reviewed     None                 XR ankle 3+ views RIGHT   Final Result by Robson Ashraf MD (01/22 5532)      No acute osseous abnormality  Workstation performed: [id number]         XR finger fifth digit-pinkie RIGHT   Final Result by Robson Ashraf MD (01/22 1000)      No acute osseous abnormality  Old 5th proximal phalangeal fracture  Workstation performed: [id number]         XR finger third digit-middle LEFT   Final Result by Robson Ashraf MD (01/22 1000)      No acute osseous abnormality  Workstation performed: [id number]         XR hand 3+ views RIGHT   Final Result by Robson Ashraf MD (01/22 6375)      No acute osseous abnormality  Old 5th proximal phalangeal fracture  Workstation performed: [id number]                    Procedures  Splint application    Date/Time: 1/21/2023 2:09 PM  Performed by: Samantha Marcano DO  Authorized by: Samantha Marcano DO   Universal Protocol:  Consent: Verbal consent obtained  Consent given by: patient  Patient identity confirmed: verbally with patient      Pre-procedure details:     Sensation:  Normal  Procedure details:     Laterality:  Right    Location:  Hand    Hand:  R hand    Splint type:  Ulnar gutter    Supplies:  Cotton padding and fiberglass  Orthopedic injury treatment    Date/Time: 1/22/2023 2:10 PM  Performed by: Samantha Marcano DO  Authorized by: Samantha Marcano DO   Universal Protocol:  Consent: Verbal consent obtained    Consent given by: patient  Patient identity confirmed: verbally with patient      Injury location:  Finger  Location details:  Right little finger  Injury type:  Dislocation  Dislocation type: MCP    Neurovascular status: Neurovascularly intact    Local anesthesia used?: No    General anesthesia used?: No    Manipulation performed?: Yes    Reduction successful?: Yes    Splint type:  Ulnar gutter  Patient tolerance:  Patient tolerated the procedure well with no immediate complications             ED Course                               SBIRT 22yo+    Flowsheet Row Most Recent Value   SBIRT (25 yo +)    In order to provide better care to our patients, we are screening all of our patients for alcohol and drug use  Would it be okay to ask you these screening questions? No Filed at: 01/21/2023 1710                    Medical Decision Making  No fx on XR per my read  Finger is deviated but without a definite dislocation  It was manipulated during splinting to improve alignment but there was no clear reduction of a joint appreciated by this provider  Ulnar gutter splint applied by me and pt will fu with ortho  Air cast to L ankle applied by RN  Fu ortho prn      Ankle sprain: complicated acute illness or injury  Dislocation of finger, initial encounter: complicated acute illness or injury  Fall, initial encounter: complicated acute illness or injury  Amount and/or Complexity of Data Reviewed  Radiology: ordered  Risk  OTC drugs  Prescription drug management  Disposition  Final diagnoses:   Fall, initial encounter   Dislocation of finger, initial encounter   Ankle sprain     Time reflects when diagnosis was documented in both MDM as applicable and the Disposition within this note     Time User Action Codes Description Comment    1/21/2023  5:03 PM Cliff Sadennise Add [U24  SAXI] Fall, initial encounter     1/21/2023  5:03 PM Cliff Saupe Add [W05 058Q] Dislocation of finger, initial encounter     1/21/2023  5:03 PM Cliff Saupe Add [S93 409A] Ankle sprain       ED Disposition     ED Disposition   Discharge    Condition   Stable    Date/Time   Sat Jan 21, 2023  5:03 PM    Comment   Wilma Weiss discharge to home/self care  Follow-up Information     Follow up With Specialties Details Why [address] , DO Orthopedic Surgery In 1 week for re-assessment of finger [address]  [phone]            Discharge Medication List as of 1/21/2023  5:04 PM      CONTINUE these medications which have NOT CHANGED    Details   albuterol (2 5 mg/3 mL) 0 083 % nebulizer solution Take 2 5 mg by nebulization 2 (two) times a day, Historical Med      busPIRone (BUSPAR) 5 mg tablet TAKE 1 TABLET BY MOUTH TWICE A DAY, Normal      famotidine (PEPCID) 20 mg tablet Take 1 tablet (20 mg total) by mouth 2 (two) times a day, Starting Wed 3/23/2022, Normal      ibuprofen (MOTRIN) 600 mg tablet Take 1 tablet (600 mg total) by mouth every 6 (six) hours as needed for mild pain for up to 10 days, Starting Sun 8/28/2022, Until u 9/29/2022 at 2359, Normal      ondansetron (ZOFRAN) 4 mg tablet TAKE 1 TABLET BY MOUTH EVERY 8 HOURS AS NEEDED FOR NAUSEA FOR VOMITING, Normal      sertraline (ZOLOFT) 100 mg tablet TAKE 1 TABLET BY MOUTH EVERYDAY AT BEDTIME, Normal             No discharge procedures on file      PDMP Review     None          ED Provider  Electronically Signed by           Rick Gaitan DO  01/22/23 6495

## 2023-01-31 DIAGNOSIS — F41.9 ANXIETY: ICD-10-CM

## 2023-01-31 RX ORDER — BUSPIRONE HYDROCHLORIDE 5 MG/1
TABLET ORAL
Qty: 60 TABLET | Refills: 0 | Status: SHIPPED | OUTPATIENT
Start: 2023-01-31

## 2023-03-01 ENCOUNTER — ANESTHESIA (INPATIENT)
Dept: PERIOP | Facility: HOSPITAL | Age: 38
End: 2023-03-01

## 2023-03-01 ENCOUNTER — APPOINTMENT (EMERGENCY)
Dept: RADIOLOGY | Facility: HOSPITAL | Age: 38
End: 2023-03-01

## 2023-03-01 ENCOUNTER — OFFICE VISIT (OUTPATIENT)
Dept: FAMILY MEDICINE CLINIC | Facility: CLINIC | Age: 38
End: 2023-03-01

## 2023-03-01 ENCOUNTER — HOSPITAL ENCOUNTER (INPATIENT)
Facility: HOSPITAL | Age: 38
LOS: 3 days | Discharge: HOME | End: 2023-03-04
Attending: EMERGENCY MEDICINE | Admitting: FAMILY MEDICINE

## 2023-03-01 ENCOUNTER — ANESTHESIA EVENT (INPATIENT)
Dept: PERIOP | Facility: HOSPITAL | Age: 38
End: 2023-03-01

## 2023-03-01 VITALS
HEIGHT: 67 IN | WEIGHT: 235 LBS | HEART RATE: 97 BPM | OXYGEN SATURATION: 95 % | TEMPERATURE: 99.9 F | DIASTOLIC BLOOD PRESSURE: 72 MMHG | BODY MASS INDEX: 36.88 KG/M2 | SYSTOLIC BLOOD PRESSURE: 124 MMHG

## 2023-03-01 DIAGNOSIS — L02.31 LEFT BUTTOCK ABSCESS: Primary | ICD-10-CM

## 2023-03-01 DIAGNOSIS — L03.317 CELLULITIS AND ABSCESS OF BUTTOCK: ICD-10-CM

## 2023-03-01 DIAGNOSIS — K59.09 OTHER CONSTIPATION: ICD-10-CM

## 2023-03-01 DIAGNOSIS — D53.9 ANEMIA ASSOCIATED WITH NUTRITIONAL DEFICIENCY: ICD-10-CM

## 2023-03-01 DIAGNOSIS — L02.31 CELLULITIS AND ABSCESS OF BUTTOCK: ICD-10-CM

## 2023-03-01 DIAGNOSIS — L02.91 ABSCESS: ICD-10-CM

## 2023-03-01 PROBLEM — J45.909 ASTHMA: Status: ACTIVE | Noted: 2023-03-01

## 2023-03-01 PROBLEM — J44.9 CHRONIC OBSTRUCTIVE PULMONARY DISEASE (HCC): Status: ACTIVE | Noted: 2023-03-01

## 2023-03-01 PROBLEM — D64.9 ANEMIA: Status: ACTIVE | Noted: 2023-03-01

## 2023-03-01 PROBLEM — B86 SCABIES: Status: RESOLVED | Noted: 2018-04-19 | Resolved: 2023-03-01

## 2023-03-01 PROBLEM — A41.9 SEPSIS (HCC): Status: ACTIVE | Noted: 2023-03-01

## 2023-03-01 LAB
ALBUMIN SERPL BCP-MCNC: 3.9 G/DL (ref 3.5–5)
ALP SERPL-CCNC: 52 U/L (ref 34–104)
ALT SERPL W P-5'-P-CCNC: 7 U/L (ref 7–52)
ANION GAP SERPL CALCULATED.3IONS-SCNC: 9 MMOL/L (ref 4–13)
APTT PPP: 32 SECONDS (ref 23–37)
AST SERPL W P-5'-P-CCNC: 10 U/L (ref 13–39)
B-HCG SERPL-ACNC: <1 MIU/ML (ref 0–11.6)
BASOPHILS # BLD MANUAL: 0 THOUSAND/UL (ref 0–0.1)
BASOPHILS NFR MAR MANUAL: 0 % (ref 0–1)
BILIRUB SERPL-MCNC: 0.73 MG/DL (ref 0.2–1)
BUN SERPL-MCNC: 11 MG/DL (ref 5–25)
CALCIUM SERPL-MCNC: 9 MG/DL (ref 8.4–10.2)
CHLORIDE SERPL-SCNC: 104 MMOL/L (ref 96–108)
CO2 SERPL-SCNC: 23 MMOL/L (ref 21–32)
CREAT SERPL-MCNC: 0.82 MG/DL (ref 0.6–1.3)
EOSINOPHIL # BLD MANUAL: 0 THOUSAND/UL (ref 0–0.4)
EOSINOPHIL NFR BLD MANUAL: 0 % (ref 0–6)
ERYTHROCYTE [DISTWIDTH] IN BLOOD BY AUTOMATED COUNT: 13.5 % (ref 11.6–15.1)
GFR SERPL CREATININE-BSD FRML MDRD: 91 ML/MIN/1.73SQ M
GLUCOSE SERPL-MCNC: 93 MG/DL (ref 65–140)
HCT VFR BLD AUTO: 40.4 % (ref 34.8–46.1)
HGB BLD-MCNC: 13.8 G/DL (ref 11.5–15.4)
INR PPP: 1.22 (ref 0.84–1.19)
LACTATE SERPL-SCNC: 0.9 MMOL/L (ref 0.5–2)
LG PLATELETS BLD QL SMEAR: PRESENT
LYMPHOCYTES # BLD AUTO: 1.25 THOUSAND/UL (ref 0.6–4.47)
LYMPHOCYTES # BLD AUTO: 5 % (ref 14–44)
MCH RBC QN AUTO: 33.1 PG (ref 26.8–34.3)
MCHC RBC AUTO-ENTMCNC: 34.2 G/DL (ref 31.4–37.4)
MCV RBC AUTO: 97 FL (ref 82–98)
MONOCYTES # BLD AUTO: 0.75 THOUSAND/UL (ref 0–1.22)
MONOCYTES NFR BLD: 3 % (ref 4–12)
NEUTROPHILS # BLD MANUAL: 22.95 THOUSAND/UL (ref 1.85–7.62)
NEUTS BAND NFR BLD MANUAL: 24 % (ref 0–8)
NEUTS SEG NFR BLD AUTO: 68 % (ref 43–75)
PLATELET # BLD AUTO: 255 THOUSANDS/UL (ref 149–390)
PLATELET BLD QL SMEAR: ADEQUATE
PMV BLD AUTO: 9.9 FL (ref 8.9–12.7)
POTASSIUM SERPL-SCNC: 3.6 MMOL/L (ref 3.5–5.3)
PROT SERPL-MCNC: 7.1 G/DL (ref 6.4–8.4)
PROTHROMBIN TIME: 15.5 SECONDS (ref 11.6–14.5)
RBC # BLD AUTO: 4.17 MILLION/UL (ref 3.81–5.12)
RBC MORPH BLD: NORMAL
SARS-COV-2 RNA RESP QL NAA+PROBE: NEGATIVE
SODIUM SERPL-SCNC: 136 MMOL/L (ref 135–147)
WBC # BLD AUTO: 24.95 THOUSAND/UL (ref 4.31–10.16)

## 2023-03-01 PROCEDURE — 0J990ZZ DRAINAGE OF BUTTOCK SUBCUTANEOUS TISSUE AND FASCIA, OPEN APPROACH: ICD-10-PCS | Performed by: SURGERY

## 2023-03-01 RX ORDER — MAGNESIUM HYDROXIDE 1200 MG/15ML
LIQUID ORAL AS NEEDED
Status: DISCONTINUED | OUTPATIENT
Start: 2023-03-01 | End: 2023-03-01 | Stop reason: HOSPADM

## 2023-03-01 RX ORDER — PYRAZINAMIDE 500 MG/1
TABLET ORAL
COMMUNITY
Start: 2022-12-08 | End: 2023-03-04

## 2023-03-01 RX ORDER — SODIUM CHLORIDE, SODIUM LACTATE, POTASSIUM CHLORIDE, CALCIUM CHLORIDE 600; 310; 30; 20 MG/100ML; MG/100ML; MG/100ML; MG/100ML
100 INJECTION, SOLUTION INTRAVENOUS CONTINUOUS
Status: DISCONTINUED | OUTPATIENT
Start: 2023-03-01 | End: 2023-03-04

## 2023-03-01 RX ORDER — BENZONATATE 100 MG/1
100 CAPSULE ORAL 3 TIMES DAILY PRN
Status: DISCONTINUED | OUTPATIENT
Start: 2023-03-01 | End: 2023-03-04 | Stop reason: HOSPADM

## 2023-03-01 RX ORDER — NICOTINE 21 MG/24HR
1 PATCH, TRANSDERMAL 24 HOURS TRANSDERMAL DAILY
Status: DISCONTINUED | OUTPATIENT
Start: 2023-03-02 | End: 2023-03-01

## 2023-03-01 RX ORDER — DEXAMETHASONE SODIUM PHOSPHATE 4 MG/ML
INJECTION, SOLUTION INTRA-ARTICULAR; INTRALESIONAL; INTRAMUSCULAR; INTRAVENOUS; SOFT TISSUE AS NEEDED
Status: DISCONTINUED | OUTPATIENT
Start: 2023-03-01 | End: 2023-03-01

## 2023-03-01 RX ORDER — PENICILLIN V POTASSIUM 500 MG/1
TABLET ORAL
Status: ON HOLD | COMMUNITY
Start: 2022-12-08 | End: 2023-03-01

## 2023-03-01 RX ORDER — NICOTINE 21 MG/24HR
1 PATCH, TRANSDERMAL 24 HOURS TRANSDERMAL DAILY
Status: DISCONTINUED | OUTPATIENT
Start: 2023-03-01 | End: 2023-03-04 | Stop reason: HOSPADM

## 2023-03-01 RX ORDER — IPRATROPIUM BROMIDE AND ALBUTEROL SULFATE 2.5; .5 MG/3ML; MG/3ML
3 SOLUTION RESPIRATORY (INHALATION) EVERY 6 HOURS PRN
Status: DISCONTINUED | OUTPATIENT
Start: 2023-03-01 | End: 2023-03-01

## 2023-03-01 RX ORDER — LIDOCAINE HYDROCHLORIDE 10 MG/ML
INJECTION, SOLUTION EPIDURAL; INFILTRATION; INTRACAUDAL; PERINEURAL AS NEEDED
Status: DISCONTINUED | OUTPATIENT
Start: 2023-03-01 | End: 2023-03-01

## 2023-03-01 RX ORDER — ACETAMINOPHEN 325 MG/1
650 TABLET ORAL EVERY 6 HOURS PRN
Status: DISCONTINUED | OUTPATIENT
Start: 2023-03-01 | End: 2023-03-04 | Stop reason: HOSPADM

## 2023-03-01 RX ORDER — BUPIVACAINE HYDROCHLORIDE AND EPINEPHRINE 2.5; 5 MG/ML; UG/ML
INJECTION, SOLUTION EPIDURAL; INFILTRATION; INTRACAUDAL; PERINEURAL AS NEEDED
Status: DISCONTINUED | OUTPATIENT
Start: 2023-03-01 | End: 2023-03-01 | Stop reason: HOSPADM

## 2023-03-01 RX ORDER — SUCCINYLCHOLINE/SOD CL,ISO/PF 100 MG/5ML
SYRINGE (ML) INTRAVENOUS AS NEEDED
Status: DISCONTINUED | OUTPATIENT
Start: 2023-03-01 | End: 2023-03-01

## 2023-03-01 RX ORDER — ONDANSETRON 2 MG/ML
4 INJECTION INTRAMUSCULAR; INTRAVENOUS ONCE AS NEEDED
Status: DISCONTINUED | OUTPATIENT
Start: 2023-03-01 | End: 2023-03-01 | Stop reason: HOSPADM

## 2023-03-01 RX ORDER — FENTANYL CITRATE 50 UG/ML
INJECTION, SOLUTION INTRAMUSCULAR; INTRAVENOUS AS NEEDED
Status: DISCONTINUED | OUTPATIENT
Start: 2023-03-01 | End: 2023-03-01

## 2023-03-01 RX ORDER — BUSPIRONE HYDROCHLORIDE 5 MG/1
5 TABLET ORAL 2 TIMES DAILY
Status: DISCONTINUED | OUTPATIENT
Start: 2023-03-01 | End: 2023-03-02

## 2023-03-01 RX ORDER — IPRATROPIUM BROMIDE AND ALBUTEROL SULFATE 2.5; .5 MG/3ML; MG/3ML
3 SOLUTION RESPIRATORY (INHALATION) 3 TIMES DAILY
Status: DISCONTINUED | OUTPATIENT
Start: 2023-03-02 | End: 2023-03-04 | Stop reason: HOSPADM

## 2023-03-01 RX ORDER — ONDANSETRON 2 MG/ML
INJECTION INTRAMUSCULAR; INTRAVENOUS AS NEEDED
Status: DISCONTINUED | OUTPATIENT
Start: 2023-03-01 | End: 2023-03-01

## 2023-03-01 RX ORDER — MEPERIDINE HYDROCHLORIDE 25 MG/ML
12.5 INJECTION INTRAMUSCULAR; INTRAVENOUS; SUBCUTANEOUS
Status: DISCONTINUED | OUTPATIENT
Start: 2023-03-01 | End: 2023-03-01 | Stop reason: HOSPADM

## 2023-03-01 RX ORDER — CEFEPIME HYDROCHLORIDE 2 G/50ML
2000 INJECTION, SOLUTION INTRAVENOUS ONCE
Status: COMPLETED | OUTPATIENT
Start: 2023-03-01 | End: 2023-03-01

## 2023-03-01 RX ORDER — HYDROMORPHONE HCL/PF 1 MG/ML
1 SYRINGE (ML) INJECTION ONCE
Status: COMPLETED | OUTPATIENT
Start: 2023-03-01 | End: 2023-03-01

## 2023-03-01 RX ORDER — PROMETHAZINE HYDROCHLORIDE 25 MG/ML
12.5 INJECTION, SOLUTION INTRAMUSCULAR; INTRAVENOUS ONCE AS NEEDED
Status: DISCONTINUED | OUTPATIENT
Start: 2023-03-01 | End: 2023-03-01 | Stop reason: HOSPADM

## 2023-03-01 RX ORDER — SODIUM CHLORIDE 9 MG/ML
INJECTION, SOLUTION INTRAVENOUS CONTINUOUS PRN
Status: DISCONTINUED | OUTPATIENT
Start: 2023-03-01 | End: 2023-03-01

## 2023-03-01 RX ORDER — PROPOFOL 10 MG/ML
INJECTION, EMULSION INTRAVENOUS AS NEEDED
Status: DISCONTINUED | OUTPATIENT
Start: 2023-03-01 | End: 2023-03-01

## 2023-03-01 RX ORDER — FENTANYL CITRATE/PF 50 MCG/ML
50 SYRINGE (ML) INJECTION
Status: DISCONTINUED | OUTPATIENT
Start: 2023-03-01 | End: 2023-03-01 | Stop reason: HOSPADM

## 2023-03-01 RX ADMIN — IPRATROPIUM BROMIDE AND ALBUTEROL SULFATE 3 ML: 2.5; .5 SOLUTION RESPIRATORY (INHALATION) at 20:43

## 2023-03-01 RX ADMIN — FENTANYL CITRATE 50 MCG: 50 INJECTION, SOLUTION INTRAMUSCULAR; INTRAVENOUS at 17:42

## 2023-03-01 RX ADMIN — ONDANSETRON 4 MG: 2 INJECTION INTRAMUSCULAR; INTRAVENOUS at 17:24

## 2023-03-01 RX ADMIN — ACETAMINOPHEN 650 MG: 325 TABLET, FILM COATED ORAL at 21:29

## 2023-03-01 RX ADMIN — FENTANYL CITRATE 50 MCG: 50 INJECTION, SOLUTION INTRAMUSCULAR; INTRAVENOUS at 17:38

## 2023-03-01 RX ADMIN — DEXAMETHASONE SODIUM PHOSPHATE 4 MG: 4 INJECTION, SOLUTION INTRA-ARTICULAR; INTRALESIONAL; INTRAMUSCULAR; INTRAVENOUS; SOFT TISSUE at 17:24

## 2023-03-01 RX ADMIN — SODIUM CHLORIDE 1000 ML: 0.9 INJECTION, SOLUTION INTRAVENOUS at 12:23

## 2023-03-01 RX ADMIN — PROPOFOL 200 MG: 10 INJECTION, EMULSION INTRAVENOUS at 17:20

## 2023-03-01 RX ADMIN — Medication 100 MG: at 17:20

## 2023-03-01 RX ADMIN — SODIUM CHLORIDE: 0.9 INJECTION, SOLUTION INTRAVENOUS at 17:15

## 2023-03-01 RX ADMIN — PROPOFOL 100 MG: 10 INJECTION, EMULSION INTRAVENOUS at 17:33

## 2023-03-01 RX ADMIN — HYDROMORPHONE HYDROCHLORIDE 1 MG: 1 INJECTION, SOLUTION INTRAMUSCULAR; INTRAVENOUS; SUBCUTANEOUS at 12:34

## 2023-03-01 RX ADMIN — VANCOMYCIN HYDROCHLORIDE 1250 MG: 10 INJECTION, POWDER, LYOPHILIZED, FOR SOLUTION INTRAVENOUS at 20:17

## 2023-03-01 RX ADMIN — SODIUM CHLORIDE, SODIUM LACTATE, POTASSIUM CHLORIDE, AND CALCIUM CHLORIDE 100 ML/HR: .6; .31; .03; .02 INJECTION, SOLUTION INTRAVENOUS at 19:19

## 2023-03-01 RX ADMIN — BUSPIRONE HYDROCHLORIDE 5 MG: 10 TABLET ORAL at 19:23

## 2023-03-01 RX ADMIN — CEFEPIME HYDROCHLORIDE 2000 MG: 2 INJECTION, SOLUTION INTRAVENOUS at 12:24

## 2023-03-01 RX ADMIN — FENTANYL CITRATE 50 MCG: 50 INJECTION, SOLUTION INTRAMUSCULAR; INTRAVENOUS at 17:52

## 2023-03-01 RX ADMIN — FENTANYL CITRATE 100 MCG: 50 INJECTION, SOLUTION INTRAMUSCULAR; INTRAVENOUS at 17:45

## 2023-03-01 RX ADMIN — FENTANYL CITRATE 50 MCG: 50 INJECTION, SOLUTION INTRAMUSCULAR; INTRAVENOUS at 17:49

## 2023-03-01 RX ADMIN — PROPOFOL 100 MG: 10 INJECTION, EMULSION INTRAVENOUS at 17:34

## 2023-03-01 RX ADMIN — FENTANYL CITRATE 100 MCG: 50 INJECTION, SOLUTION INTRAMUSCULAR; INTRAVENOUS at 17:19

## 2023-03-01 RX ADMIN — VANCOMYCIN HYDROCHLORIDE 1250 MG: 10 INJECTION, POWDER, LYOPHILIZED, FOR SOLUTION INTRAVENOUS at 13:22

## 2023-03-01 RX ADMIN — LIDOCAINE HYDROCHLORIDE 50 MG: 10 INJECTION, SOLUTION EPIDURAL; INFILTRATION; INTRACAUDAL; PERINEURAL at 17:20

## 2023-03-01 RX ADMIN — IOHEXOL 100 ML: 350 INJECTION, SOLUTION INTRAVENOUS at 14:11

## 2023-03-01 RX ADMIN — NICOTINE 1 PATCH: 14 PATCH, EXTENDED RELEASE TRANSDERMAL at 20:17

## 2023-03-01 NOTE — ANESTHESIA POSTPROCEDURE EVALUATION
Post-Op Assessment Note    CV Status:  Stable  Pain Score: 2    Pain management: adequate     Mental Status:  Sleepy   Hydration Status:  Stable   PONV Controlled:  None   Airway Patency:  Patent      Post Op Vitals Reviewed: Yes      Staff: Anesthesiologist         No notable events documented      BP   133/61   Temp   100 1   Pulse  88   Resp      SpO2   97%

## 2023-03-01 NOTE — H&P
History and Physical - [address] [facility] Residency     Patient Information: Tommy Navas 44 y o  female MRN: [id number]  Unit/Bed#: [address] [facility] Encounter: [id number]  Admitting Physician: Penny Ahn DO   PCP: López Thomas MD  Date of Admission:  03/01/23     Assessment and Plan     * Sepsis [facility])  Assessment & Plan  Met criteria for significant leukocytosis, tachycardia, and tachypnea  Patient received bolus IV fluids in the ED  Patient was started on cefepime 2 g and vancomycin   - Blood cultures and wound cultures pending  - Continue Cefepime 2 g and Vancomycin pending cultures  -  cc/hr per surgery    Left buttock abscess  Assessment & Plan  37F with no significant PMHx presents with 3 days of worsening pain over the left buttock and fevers Tmax 103 at home  See media for details  No recent injury or infections  Significant leukocytosis noted on admission  CT showed extensive cellulitis of the left buttock region and extending into the anal verge with droplets of gas  Patient received Cefepime and Vancomycin in the ED   Patient was evaluated by surgery in the ED and added to or scheduled for incision and drainage  - NPO  -  cc/hr   - Continue cefepime and vancomycin pending cultures  - Trend WBC    Gastroesophageal reflux disease  Assessment & Plan  Patient not taking Pepcid 20 mg    Smoking  Assessment & Plan  Patient reports smoking half pack per day  - Nicotine patch ordered    Anxiety  Assessment & Plan  Chronic    Patient reports her anxiety is worsening with this painful abscess  - Continue BuSpar 5 mg twice daily    Depression  Assessment & Plan  Chronic    Patient reports she self discontinued Zoloft months ago, reports mood is improved  Patient reports she is taking BuSpar 5 mg twice daily  - Continue BuSpar 5 mg twice daily    Asthma  Assessment & Plan  Patient reports she does not use her albuterol inhaler  - Monitor O2 sats         Fluids:  cc/hr  Electrolyte repletion: Replete K now, and as needed  Nutrition:        Diet Orders   (From admission, onward)             Start     Ordered    03/01/23 1647  Diet NPO; Sips with meds  Diet effective now        References:    Nutrtion Support Algorithm Enteral vs  Parenteral   Question Answer Comment   Diet Type NPO    NPO Except: Sips with meds    RD to adjust diet per protocol? Yes        03/01/23 1648               VTE Prophylaxis: VTE Score: 3 Moderate Risk (Score 3-4) - Pharmacological DVT Prophylaxis Ordered: held preop  Code Status: Level 1 - Full Code  Anticipated Length of Stay:  Patient will be admitted on an Inpatient basis with an anticipated length of stay of  more than 2 midnights  Justification for Hospital Stay: Sepsis, buttock abscess  Total Time for Visit, including Counseling / Coordination of Care: 30 mins  Greater than 50% of this total time spent on direct patient counseling and coordination of care  Patient Information Sharing: Patient was notified of the inpatient team's paln  All questions answered  Chief Complaint:     Chief Complaint   Patient presents with   • Abscess     Painful abscess on buttocks       Subjective      History of Present Illness:     Lashaun Simons is a 44 y o  female who presents with pain over the left buttocks for the past 3 days, associated with foul-smelling odor and fevers at home, Tmax 103, sent from clinic for further evaluation  Patient reports she cannot remember any injuries or recent infections in the area  Patient states it started off small and progressively growing in size  Patient states she has a cat at home  Patient states she lives at home, however is ambulatory  Denies any history of diabetes or IV drug use  Denies chills, abdominal pain, chest pain, or shortness of breath  Patient reports since being in the ED, pain is improved significantly, still having some pain with movements   Patient was seen at [facility] earlier today, see office note for details  Review of Systems:  Review of Systems     Past Medical History:   Diagnosis Date   • Asthma    • COPD (chronic obstructive pulmonary disease) ([facility] [address] )    • Macular degeneration     right eye   • Periodontitis    • Psychiatric disorder     depression, anxiety     Past Surgical History:   Procedure Laterality Date   •  SECTION     • TONSILECTOMY AND ADNOIDECTOMY     • TUBAL LIGATION       Allergies   Allergen Reactions   • Penicillins Hives     Reaction Date: 2006;      Prior to Admission Medications   Prescriptions Last Dose Informant Patient Reported?  Taking?   acetaminophen-codeine (TYLENOL with CODEINE #3) 300-30 MG per tablet Not Taking  Yes No   Sig: TAKE ONE BY MOUTH EVERY 6 HOURS AS NEEDED FOR PAIN   Patient not taking: Reported on 3/1/2023   albuterol (2 5 mg/3 mL) 0 083 % nebulizer solution Not Taking  Yes No   Sig: Take 2 5 mg by nebulization 2 (two) times a day   Patient not taking: Reported on 3/1/2023   busPIRone (BUSPAR) 5 mg tablet 2023  No Yes   Sig: TAKE 1 TABLET BY MOUTH TWICE A DAY   famotidine (PEPCID) 20 mg tablet Not Taking  No No   Sig: Take 1 tablet (20 mg total) by mouth 2 (two) times a day   Patient not taking: Reported on 3/1/2023   ibuprofen (MOTRIN) 600 mg tablet   No No   Sig: Take 1 tablet (600 mg total) by mouth every 6 (six) hours as needed for mild pain for up to 10 days   ondansetron (ZOFRAN) 4 mg tablet Not Taking  No No   Sig: TAKE 1 TABLET BY MOUTH EVERY 8 HOURS AS NEEDED FOR NAUSEA FOR VOMITING   Patient not taking: Reported on 3/1/2023   sertraline (ZOLOFT) 100 mg tablet Not Taking  No No   Sig: TAKE 1 TABLET BY MOUTH EVERYDAY AT BEDTIME   Patient not taking: Reported on 3/1/2023      Facility-Administered Medications: None     Social History     Socioeconomic History   • Marital status: Legally      Spouse name: Not on file   • Number of children: 2   • Years of education: Not on file   • Highest education level: Some college, no degree   Occupational History   • Not on file   Tobacco Use   • Smoking status: Every Day     Packs/day: 0 50     Years: 25 00     Pack years: 12 50     Types: Cigarettes   • Smokeless tobacco: Never   Vaping Use   • Vaping Use: Never used   Substance and Sexual Activity   • Alcohol use: No     Comment: socially /  never per [employer]   • Drug use: No   • Sexual activity: Yes     Partners: Male     Birth control/protection: None   Other Topics Concern   • Not on file   Social History Narrative   • Not on file     Social Determinants of Health     Financial Resource Strain: Low Risk    • Difficulty of Paying Living Expenses: Not very hard   Food Insecurity: No Food Insecurity   • Worried About Running Out of Food in the Last Year: Never true   • Ran Out of Food in the Last Year: Never true   Transportation Needs: No Transportation Needs   • Lack of Transportation (Medical): No   • Lack of Transportation (Non-Medical): No   Physical Activity: Not on file   Stress: Stress Concern Present   • Feeling of Stress :  To some extent   Social Connections: Not on file   Intimate Partner Violence: Not on file   Housing Stability: Not on file     Family History   Problem Relation Age of Onset   • Hypertension Mother    • Hyperlipidemia Mother    • Arthritis Mother    • Diabetes Mother    • Asthma Mother    • Thyroid disease Mother    • Diabetes Father    • Hypertension Father    • Thyroid disease Sister    • Diabetes Sister    • Thyroid disease Daughter    • Hyperlipidemia Daughter    • Asthma Daughter         Patient Pre-hospital Living Situation:Home  Patient Pre-hospital Level of Mobility: Ambulatory  Patient Pre-hospital Diet Restrictions: None        Objective     Physical Exam:   Vitals:   Blood Pressure: 107/53 (03/01/23 1614)  Pulse: 92 (03/01/23 1614)  Temperature: 100 2 °F (37 9 °C) (03/01/23 1614)  Temp Source: Oral (03/01/23 1518)  Respirations: 20 (03/01/23 1614)  Height: 5' 7" (170 2 cm) (03/01/23 1518)  Weight - Scale: 107 kg (235 lb) (03/01/23 1518)  SpO2: 96 % (03/01/23 1614)     Physical Exam  Vitals reviewed  Constitutional:       Appearance: She is obese  Comments: Lying on stretcher in no acute distress, but uncomfortable when moving   HENT:      Mouth/Throat:      Mouth: Mucous membranes are moist       Pharynx: No oropharyngeal exudate or posterior oropharyngeal erythema  Eyes:      Extraocular Movements: Extraocular movements intact  Cardiovascular:      Rate and Rhythm: Normal rate and regular rhythm  Pulses: Normal pulses  Heart sounds: Normal heart sounds  No murmur heard  Pulmonary:      Effort: No respiratory distress  Breath sounds: No wheezing  Abdominal:      General: There is no distension  Tenderness: There is no abdominal tenderness  There is no guarding  Musculoskeletal:         General: No swelling or deformity  Skin:     General: Skin is warm and dry  Comments: Erythema and fluctuance over left perineal/gluteal region, see media for details   Neurological:      Mental Status: She is alert  Psychiatric:         Mood and Affect: Mood normal           Lab Results: I have personally reviewed pertinent reports  Results from last 7 days   Lab Units 03/01/23  1218   WBC Thousand/uL 24 95*   HEMOGLOBIN g/dL 13 8   HEMATOCRIT % 40 4   PLATELETS Thousands/uL 255   LYMPHO PCT % 5*   MONO PCT % 3*   EOS PCT % 0   BANDS PCT % 24*     Results from last 7 days   Lab Units 03/01/23  1218   POTASSIUM mmol/L 3 6   CHLORIDE mmol/L 104   CO2 mmol/L 23   BUN mg/dL 11   CREATININE mg/dL 0 82   CALCIUM mg/dL 9 0   ALK PHOS U/L 52   ALT U/L 7   AST U/L 10*   EGFR ml/min/1 73sq m 91     Results from last 7 days   Lab Units 03/01/23  1218   INR  1 22*     Results from last 7 days   Lab Units 03/01/23  1218   LACTIC ACID mmol/L 0 9                    Invalid input(s): URIBILINOGEN                    Imaging: I have personally reviewed pertinent reports  CT abdomen pelvis with contrast    Result Date: 3/1/2023  Extensive cellulitis of the left buttock region extending from the anal verge with droplets of gas which suggests gas-forming organisms  Small collection measuring 2 cm may be evident  No evidence of bowel dilatation or inflammatory change  Multiple gallstones   This was discussed with Dr Keesha Adrian at 3:10 PM Workstation performed: [id number]      EKG, Pathology, and Other Studies were reviewed on Admission:   EKG Date: 03/01/23 Impression:  NSR           Entire H&P was discussed with Dr Kaylen Choudhury who agreed to what is noted above     ** Please Note: This note has been constructed using a voice recognition system **     Christine Castle MD  03/01/23  4:54 PM

## 2023-03-01 NOTE — ED PROVIDER NOTES
History  Chief Complaint   Patient presents with   • Abscess     Painful abscess on buttocks     Patient was sent from her primary doctor's office today for evaluation of a perineal abscess  Patient states she has been sick for 3 days with fever chills, increased pain and pressure in the left perineal area into the buttocks anteriorly  She states there was a "cyst" in the area for about a year before this  She states she has had problems with "boils" on other parts of her body  Patient states she put a "drawing salve" on it and it spontaneously opened and started draining foul-smelling pus  Patient has no history of inflammatory bowel disease or fistula formation  She has not been on antibiotics recently  Prior to Admission Medications   Prescriptions Last Dose Informant Patient Reported?  Taking?   acetaminophen-codeine (TYLENOL with CODEINE #3) 300-30 MG per tablet   Yes No   Sig: TAKE ONE BY MOUTH EVERY 6 HOURS AS NEEDED FOR PAIN   albuterol (2 5 mg/3 mL) 0 083 % nebulizer solution   Yes No   Sig: Take 2 5 mg by nebulization 2 (two) times a day   busPIRone (BUSPAR) 5 mg tablet   No No   Sig: TAKE 1 TABLET BY MOUTH TWICE A DAY   famotidine (PEPCID) 20 mg tablet   No No   Sig: Take 1 tablet (20 mg total) by mouth 2 (two) times a day   ibuprofen (MOTRIN) 600 mg tablet   No No   Sig: Take 1 tablet (600 mg total) by mouth every 6 (six) hours as needed for mild pain for up to 10 days   ondansetron (ZOFRAN) 4 mg tablet   No No   Sig: TAKE 1 TABLET BY MOUTH EVERY 8 HOURS AS NEEDED FOR NAUSEA FOR VOMITING   penicillin V potassium (VEETID) 500 mg tablet   Yes No   Sig: TAKE ONE BY MOUTH 4 TIMES A DAY   sertraline (ZOLOFT) 100 mg tablet   No No   Sig: TAKE 1 TABLET BY MOUTH EVERYDAY AT BEDTIME      Facility-Administered Medications: None       Past Medical History:   Diagnosis Date   • Asthma    • COPD (chronic obstructive pulmonary disease) ([facility])    • Macular degeneration     right eye   • Periodontitis    • Psychiatric disorder     depression, anxiety       Past Surgical History:   Procedure Laterality Date   •  SECTION     • TONSILECTOMY AND ADNOIDECTOMY     • TUBAL LIGATION  2012       Family History   Problem Relation Age of Onset   • Hypertension Mother    • Hyperlipidemia Mother    • Arthritis Mother    • Diabetes Mother    • Asthma Mother    • Thyroid disease Mother    • Diabetes Father    • Hypertension Father    • Thyroid disease Sister    • Diabetes Sister    • Thyroid disease Daughter    • Hyperlipidemia Daughter    • Asthma Daughter      I have reviewed and agree with the history as documented  E-Cigarette/Vaping   • E-Cigarette Use Never User      E-Cigarette/Vaping Substances   • Nicotine No    • THC No    • CBD No    • Flavoring No    • Other No    • Unknown No      Social History     Tobacco Use   • Smoking status: Every Day     Packs/day: 0 50     Years: 25 00     Pack years: 12 50     Types: Cigarettes   • Smokeless tobacco: Never   Vaping Use   • Vaping Use: Never used   Substance Use Topics   • Alcohol use: No     Comment: socially /  never per [employer]   • Drug use: No       Review of Systems   Constitutional: Positive for chills and fever  HENT: Negative for congestion and sore throat  Eyes: Negative for visual disturbance  Respiratory: Negative for cough and shortness of breath  Cardiovascular: Negative for chest pain  Gastrointestinal: Positive for nausea  Negative for abdominal pain, diarrhea and vomiting  Genitourinary: Negative for dysuria  Musculoskeletal: Negative for back pain  Skin: Positive for wound  Neurological: Negative for syncope and headaches  Hematological: Does not bruise/bleed easily  Psychiatric/Behavioral: Negative for confusion  All other systems reviewed and are negative  Physical Exam  Physical Exam  Vitals and nursing note reviewed  Constitutional:       Appearance: She is obese  HENT:      Head: Normocephalic        Right Ear: External ear normal       Left Ear: External ear normal       Nose: Nose normal       Mouth/Throat:      Pharynx: Oropharynx is clear  Eyes:      Conjunctiva/sclera: Conjunctivae normal    Cardiovascular:      Rate and Rhythm: Regular rhythm  Tachycardia present  Pulses: Normal pulses  Pulmonary:      Effort: Pulmonary effort is normal       Breath sounds: Normal breath sounds  Abdominal:      Palpations: Abdomen is soft  Tenderness: There is no abdominal tenderness  Genitourinary:     Comments: Patient has extensive cellulitis in the left gluteus extending to the perineum  There is a sinus opening draining elsa pus in the anterior perineum on the left  Quite tender to palpation  Musculoskeletal:         General: Normal range of motion  Cervical back: Normal range of motion and neck supple  Skin:     General: Skin is warm and dry  Capillary Refill: Capillary refill takes less than 2 seconds  Neurological:      General: No focal deficit present  Mental Status: She is alert and oriented to person, place, and time     Psychiatric:         Mood and Affect: Mood normal          Behavior: Behavior normal          Vital Signs  ED Triage Vitals   Temperature Pulse Respirations Blood Pressure SpO2   03/01/23 1110 03/01/23 1110 03/01/23 1110 03/01/23 1113 03/01/23 1110   100 2 °F (37 9 °C) 101 (!) 24 160/97 99 %      Temp Source Heart Rate Source Patient Position - Orthostatic VS BP Location FiO2 (%)   03/01/23 1110 03/01/23 1110 03/01/23 1110 03/01/23 1110 --   Tympanic Monitor Sitting Right arm       Pain Score       03/01/23 1110       10 - Worst Possible Pain           Vitals:    03/01/23 1110 03/01/23 1113   BP:  160/97   Pulse: 101    Patient Position - Orthostatic VS: Sitting Sitting         Visual Acuity      ED Medications  Medications   sodium chloride 0 9 % bolus 1,000 mL (has no administration in time range)   vancomycin (VANCOCIN) 1,250 mg in sodium chloride 0 9 % 250 mL IVPB (has no administration in time range)   cefepime (MAXIPIME) IVPB (premix in dextrose) 2,000 mg 50 mL (has no administration in time range)       Diagnostic Studies  Results Reviewed     Procedure Component Value Units Date/Time    Wound culture and Gram stain [[id number]]     Lab Status: No result Specimen: Wound     Protime-INR [[id number]]     Lab Status: No result Specimen: Blood     APTT [[id number]]     Lab Status: No result Specimen: Blood     Comprehensive metabolic panel [[id number]]     Lab Status: No result Specimen: Blood     Blood culture #1 [[id number]]     Lab Status: No result Specimen: Blood     Blood culture #2 [[id number]]     Lab Status: No result Specimen: Blood     Lactic acid [[id number]]     Lab Status: No result Specimen: Blood     UA w Reflex to Microscopic w Reflex to Culture [[id number]]     Lab Status: No result Specimen: Urine     CBC and differential [[id number]]     Lab Status: No result Specimen: Blood                  XR chest 1 view portable    (Results Pending)   CT abdomen pelvis with contrast    (Results Pending)              Procedures  Procedures         ED Course                                             Medical Decision Making  Perineal abscess and cellulitis  Patient has evidence of sepsis/SIRS criteria  CT imaging for possible collection  Antibiotics started, surgery notified    Amount and/or Complexity of Data Reviewed  Labs: ordered  Radiology: ordered  Risk  Prescription drug management  Disposition  Final diagnoses:   None     ED Disposition     None      Follow-up Information    None         Patient's Medications   Discharge Prescriptions    No medications on file       No discharge procedures on file      PDMP Review     None          ED Provider  Electronically Signed by           Peter Hamm MD  03/02/23 0700

## 2023-03-01 NOTE — ASSESSMENT & PLAN NOTE
POA,  pain over the left buttock, leukocytosis noted on admission  Reports fevers at home (Tmax 103)  CT showed extensive cellulitis of the left buttock region and extending into the anal verge with droplets of gas        · Patient much improved since I&D, reports no pain  · Leukocytosis resolved (WBC 9 64 today)   · Blood cx 1 out of 2 growing gram pos, possibly contaminant  · Wound cx growing 3+ gram negative, preliminary  · Continue Cefepime and Vancomycin  · Continue to monitor wound status  · Tylenol 650 mg prn pain  · Pt without BM for 4 days, continue Miralax to avoid straining  · Surgery consulted - appreciate recs

## 2023-03-01 NOTE — CONSULTS
Consultation - General Surgery   Wilma Carmen Mojica 44 y o  female MRN: [id number]  Unit/Bed#: ED 16 Encounter: [id number]    Assessment/Plan     Assessment:  1) gluteal abscess -large area on left gluteal region with significant cellulitis/erythema, edema/swelling, severe tenderness to palpation, small amount of draining, incomplete draining, leukocytosis of 24,000 and, CT scan still pending, no elevated lactic acid    Plan:  1)   -Plan for incision and drainage and exam under anesthesia  -Case request placed  -To be obtained  -Reviewed labs  -Pending CT scan  -Urine pregnancy/quantitative pregnancy hCG  -COVID vaccinated  -N p o   -IV fluids 100 mL/h lactated Ringer  -IV antibiotics cefepime and vancomycin administered  -As needed analgesia provided in the emergency department  -Draw hemoglobin A1c    History of Present Illness   HPI:  Marilee Dumont is a 44 y o  female with no pertinent past medical history presenting to acute care surgery team secondary to the fact of left gluteal abscess  Patient reports that she started have the onset of pain and swelling of the left gluteal region into the perineal region over the past several days  Patient reports that she had a cystlike lesion on the right side by that it feels nothing like this left side  Patient reports that the swelling is continued to worsen and worsen with substantial pain that hurts even for her to move on her buttock region  Patient is in tears and somewhat distressed by the degree of discomfort that she is having with slight movements  Patient reports that the pain is severe enough that it feels like it takes her breath away and as a result she has some slight degree of chest tightness/shortness of breath  Patient also reports that it hurts to move her left lower extremity as result does cause some degree of weakness with trying to move her left lower extremity  Patient denies any history of Crohn's or ulcerative colitis    Patient denies any trauma to the perineal region recently  Patient denies any bites, bruises, or IV drug use  Patient was using some type of salve in order to try and facilitate drainage  Patient did report that last night it started draining slightly but not copious amounts of purulence  Patient does report that it is draining some degree of pus  Patient still has severe pain now  Patient also has been having fevers and chills  Patient has not had any history of MRSA  Patient does not have any significant recurrence of this issue  Patient does have an appetite change with nausea and vomiting  Patient does also have constipation for 3 days  Patient denies any black or blood in her stool or vomit  Patient denies any immunocompromise state, use of Humira, use of Remicade, use of Plaquenil, use of methotrexate  Patient denies any use or history of steroid use  Patient has not had recurrent infection in the past     Consult to surgery general  Consult performed by: Beverly Macias PA-C  Consult ordered by: George Toledo MD  Reason for consult: Gluteal abscess          Review of Systems   Constitutional: Positive for appetite change, chills, fatigue and fever  HENT: Negative for congestion and rhinorrhea  Respiratory: Positive for chest tightness and shortness of breath  Negative for cough and wheezing  Cardiovascular: Negative for chest pain and palpitations  Gastrointestinal: Positive for constipation, nausea and vomiting  Negative for abdominal distention, abdominal pain, blood in stool and diarrhea  Genitourinary: Negative for difficulty urinating, dysuria and hematuria  Musculoskeletal: Negative for arthralgias, gait problem, myalgias and neck stiffness  Skin: Positive for color change and wound  Neurological: Positive for weakness  Psychiatric/Behavioral: Negative for agitation and confusion         Historical Information   Past Medical History:   Diagnosis Date   • Asthma    • COPD (chronic obstructive pulmonary disease) (Formerly [facility])    • Macular degeneration     right eye   • Periodontitis    • Psychiatric disorder     depression, anxiety     Past Surgical History:   Procedure Laterality Date   •  SECTION     • TONSILECTOMY AND ADNOIDECTOMY     • TUBAL LIGATION       Social History   Social History     Substance and Sexual Activity   Alcohol Use No    Comment: socially /  never per [employer]     Social History     Substance and Sexual Activity   Drug Use No     E-Cigarette/Vaping   • E-Cigarette Use Never User      E-Cigarette/Vaping Substances   • Nicotine No    • THC No    • CBD No    • Flavoring No    • Other No    • Unknown No      Social History     Tobacco Use   Smoking Status Every Day   • Packs/day: 0 50   • Years: 25 00   • Pack years: 12 50   • Types: Cigarettes   Smokeless Tobacco Never     Family History: non-contributory    Meds/Allergies   all current active meds have been reviewed and current meds:   Current Facility-Administered Medications   Medication Dose Route Frequency   • vancomycin (VANCOCIN) 1,250 mg in sodium chloride 0 9 % 250 mL IVPB  15 mg/kg (Adjusted) Intravenous Once     Allergies   Allergen Reactions   • Penicillins Hives     Reaction Date: 2006;        Objective   First Vitals:   Blood Pressure: 160/97 (23 1113)  Pulse: 101 (23 1110)  Temperature: 100 2 °F (37 9 °C) (23 1110)  Temp Source: Tympanic (23 1110)  Respirations: (!) 24 (23 1110)  SpO2: 99 % (23 1110)    Current Vitals:   Blood Pressure: 160/97 (23 1113)  Pulse: 101 (23 1110)  Temperature: 100 2 °F (37 9 °C) (23 1110)  Temp Source: Tympanic (23 1110)  Respirations: (!) 24 (23 1110)  SpO2: 99 % (23 1110)    No intake or output data in the 24 hours ending 23 1243    Invasive Devices     Peripheral Intravenous Line  Duration           Peripheral IV 23 Left Antecubital <1 day                Physical Exam  Constitutional:       General: She is awake  She is in acute distress  Appearance: Normal appearance  She is overweight  She is not ill-appearing, toxic-appearing or diaphoretic  Interventions: She is not intubated  HENT:      Head: Normocephalic and atraumatic  Right Ear: Hearing and external ear normal       Left Ear: Hearing and external ear normal       Nose: Nose normal    Cardiovascular:      Rate and Rhythm: Normal rate and regular rhythm  Heart sounds: Normal heart sounds, S1 normal and S2 normal  Heart sounds not distant  No murmur heard  Pulmonary:      Effort: Pulmonary effort is normal  No tachypnea, bradypnea, accessory muscle usage, prolonged expiration, respiratory distress or retractions  She is not intubated  Breath sounds: Normal breath sounds  No stridor, decreased air movement or transmitted upper airway sounds  No decreased breath sounds, wheezing, rhonchi or rales  Abdominal:      General: Abdomen is flat  Palpations: Abdomen is soft  Tenderness: There is no abdominal tenderness  Genitourinary:     Exam position: Supine  Skin:     General: Skin is warm and dry  Findings: Abscess, erythema and wound present  Psychiatric:         Behavior: Behavior is cooperative  Lab Results:   I have personally reviewed pertinent lab results    , CBC:   Lab Results   Component Value Date    WBC 24 95 (H) 03/01/2023    HGB 13 8 03/01/2023    HCT 40 4 03/01/2023    MCV 97 03/01/2023     03/01/2023    MCH 33 1 03/01/2023    MCHC 34 2 03/01/2023    RDW 13 5 03/01/2023    MPV 9 9 03/01/2023   , CMP:   Lab Results   Component Value Date    SODIUM 136 03/01/2023    K 3 6 03/01/2023     03/01/2023    CO2 23 03/01/2023    BUN 11 03/01/2023    CREATININE 0 82 03/01/2023    CALCIUM 9 0 03/01/2023    AST 10 (L) 03/01/2023    ALT 7 03/01/2023    ALKPHOS 52 03/01/2023    EGFR 91 03/01/2023     Imaging: I have personally reviewed pertinent reports  EKG, Pathology, and Other Studies: I have personally reviewed pertinent reports  Counseling / Coordination of Care  Total floor / unit time spent today 65 minutes  Greater than 50% of total time was spent with the patient and / or family counseling and / or coordination of care    A description of the counseling / coordination of care: Developing plan, reviewing attending, coordinating care, physical exam, history taking, reviewing labs, reviewing imaging, patient education, obtaining consent, case request

## 2023-03-01 NOTE — ASSESSMENT & PLAN NOTE
Pt complaining if epigastric pain and not taking Pepcid 20 mg   Lipase 7      · Consider Pepcid  · Continue management for constipation

## 2023-03-01 NOTE — ANESTHESIA PREPROCEDURE EVALUATION
Procedure:  INCISION AND DRAINAGE (I&D) PERIRECTAL (Buttocks)  EXAM UNDER ANESTHESIA (EUA) (Buttocks)    Relevant Problems   GI/HEPATIC   (+) Gastroesophageal reflux disease      HEMATOLOGY   (+) Anemia      NEURO/PSYCH   (+) Anxiety   (+) Depression      PULMONARY   (+) Asthma   (+) Chronic obstructive pulmonary disease (HCC)   (+) Smoking      Musculoskeletal and Integument   (+) Scabies (Resolved)      Other   (+) Left buttock abscess   (+) Macular degeneration - right eye   (+) S/P tubal ligation 2012        Physical Exam    Airway    Mallampati score: III  TM Distance: >3 FB  Neck ROM: full     Dental       Cardiovascular  Rhythm: regular, Rate: normal,     Pulmonary  Breath sounds clear to auscultation,     Other Findings        Anesthesia Plan  ASA Score- 2 Emergent    Anesthesia Type- general with ASA Monitors  Additional Monitors:   Airway Plan: ETT  Comment: Prone positioning  Plan Factors-    Chart reviewed  Patient is a current smoker  Patient not instructed to abstain from smoking on day of procedure  Patient did not smoke on day of surgery  Induction- intravenous  Postoperative Plan- Plan for postoperative opioid use  Informed Consent- Anesthetic plan and risks discussed with patient  I personally reviewed this patient with the CRNA  Discussed and agreed on the Anesthesia Plan with the CRNA  Corwin Churchill

## 2023-03-01 NOTE — PROGRESS NOTES
[facility]  Acute Complaint Visit    Patient's Information     Name: Madhuri Helton  Age/Sex: 44 y o  female  MRN: [id number]  : 1985  VIELKA: 23     Assessment/Plan     A/P: Madhuri Helton is a 44 y o  stable obese female patient that came to the clinic today for three day of painful induration in left buttock with associated fevers  Pertinent PMH of HPV  VS stable  HR 97  PE remarkable for left buttock abscess with possible cellulitis  Plan below  Problem List Items Addressed This Visit        Other    Left buttock abscess - Primary       Pt presenting with complaints of 3 days of fevers and induration of left buttock  Refers being on period last 3 days  Rates pain 10/10 and can not sit or put pressure in the area  PE significant for induration, edema and erythema of left buttock with small linear opening actively draining brown watery fluid  VS stable  Pt took tylenol for fevers before visit  Concerned for left buttock abscess and associated cellulitis  · Sent to ED for possible admission   · Might need IV abx and surgery consult for I&D  · Also needs imaging to assess extent of infection  · Plan discussed with pt and ED called and informed        Associated orders were discussed and explained to the pt  Pertinent care gaps were addressed  Pt voiced understanding and acceptance with A/P  Pt will call the office if any further questions/concerns  Next Visit: Return if symptoms worsen or fail to improve  A/P of patient's case was discussed with the Attending, Dr Constantin Polk  Subjective     Chief Complaint     Chief Complaint   Patient presents with   • Cyst     Patient C/o large painful cyst on the left side of buttocks  Patient states the cyst site is very hard and has a fouls smelling odor x 3 days  Patient c/o nausea,vomiting and fever  Highest temp reached 103 0 yesterday relived by Tylenol         History of Present Illness     Wilma Brock is a 44 y o  female pt that came to the clinic today for complaints of 3 day history of pain in her left gluteus  She has related PMH of skin cyst in the area  Patient states that the symptoms started about 3 days ago  She has not had previous related episodes of similar symptoms  She mentioned that she has taken tylenol with mild relief of associated symptoms  Associated specific complaints addressed more below  Pt complaining of three days of painful cyst on left buttocks  Cysts has been present for the last 6 - 8 months  Started period three days ago  Pt refers that area got painful and indurated since she started her period  She has had three days of intermittent high fevers and severe HA  She is not tolerating PO  She can drink water but feels nauseous after  Pt refers that she got pale and almost [place] bus [place]  Pt refers that the cyst ruptured yesterday and it was secreting brown fouls smelling substance since yesterday night  Also has some "cottage cheese" substance  She has history of past cyst and small boils in the past but not like this current one  Also has history of HPV precancerous cells  Sexually active last week  No problems  No history of other STDs  Used only tylenol  Rates pain in the area 10/10 and can't put pressure in the area  Has been using tissue to control the drainage  I personally reviewed the following portions of the patient's history and updated as appropriate if needed: allergies, current medications, past family history, past medical history, past social history, past surgical history, and problem list  Also reviewed pertinent past labs, imaging, procedures, consults and relevant notes by other providers and addressed important details and findings in A/P section  Review of Systems     History obtained from the patient      Constitutional: Positive for activity change, appetite change, chills, diaphoresis, fatigue and fever  HENT: Negative for congestion, ear pain, mouth sores and sore throat  Respiratory: Positive for shortness of breath  Cardiovascular: Positive for chest pain  Gastrointestinal: Positive for abdominal pain, constipation, nausea and vomiting  Negative for diarrhea  Genitourinary: Positive for pelvic pain and vaginal bleeding (Menstruating)  Negative for difficulty urinating, dyspareunia, dysuria, flank pain, vaginal discharge and vaginal pain  Sexually active one week ago  Skin: Positive for wound  Neurological: Positive for dizziness, weakness and headaches  Psychiatric/Behavioral: The patient is nervous/anxious  Objective     Vital Signs     Visit Vitals  /72 (BP Location: Left arm, Patient Position: Sitting, Cuff Size: Large)   Pulse 97   Temp 99 9 °F (37 7 °C) (Tympanic)   Ht 5' 7" (1 702 m)   Wt 107 kg (235 lb)   LMP 02/26/2023   SpO2 95%   BMI 36 81 kg/m²   OB Status Unknown   Smoking Status Every Day   BSA 2 17 m²      I have reviewed the patient's vital signs and all significant values were addressed in the A/P section  Physical Exam       Physical Exam  Vitals and nursing note reviewed  Exam conducted with a chaperone present  Constitutional:       General: She is not in acute distress  Appearance: Normal appearance  She is obese  She is not ill-appearing or toxic-appearing  HENT:      Head: Normocephalic and atraumatic  Right Ear: External ear normal       Left Ear: External ear normal       Nose: Nose normal       Mouth/Throat:      Mouth: Mucous membranes are moist    Eyes:      General: No scleral icterus  Conjunctiva/sclera: Conjunctivae normal    Cardiovascular:      Rate and Rhythm: Normal rate  Pulses: Normal pulses  Pulmonary:      Effort: Pulmonary effort is normal  No respiratory distress  Abdominal:      General: Bowel sounds are normal  There is no distension        Palpations: Abdomen is soft  Tenderness: There is no abdominal tenderness  Genitourinary:     General: Normal vulva  Vagina: No vaginal discharge  Comments: Induration, erythema and edema in the left buttock with small linear opening with active drainage of brown watery foul smelling discharge  Musculoskeletal:         General: Normal range of motion  Cervical back: Normal range of motion  Right lower leg: No edema  Left lower leg: No edema  Skin:     General: Skin is warm and dry  Coloration: Skin is not jaundiced or pale  Findings: Erythema and lesion present  No bruising or rash  Neurological:      Mental Status: She is alert and oriented to person, place, and time  Psychiatric:         Mood and Affect: Mood normal          Behavior: Behavior normal          Thought Content: Thought content normal          Judgment: Judgment normal      I personally examined this patient physically and all significant findings were addressed in the A/P section  It was a pleasure being of service to [employer]  Thank you  Yissel Quinn MD , [facility]  [address]      03/01/23   9:25 AM          Portions of the record may have been created with voice recognition software  Occasional wrong word or "sound a like" substitutions may have occurred due to the inherent limitations of voice recognition software  Read the chart carefully and recognize, using context, where substitutions have occurred

## 2023-03-01 NOTE — ASSESSMENT & PLAN NOTE
POA, significant leukocytosis, tachycardia, and tachypnea  Self reported fever at home of 103F  Has been afebrile in the hospital  Patient received bolus IV fluids in the ED  WBC wnl this morning      · Leukocytosis resolved  · Blood cultures 1 out 2 growing gram pos, possibly contaminant  · Wound cx growing 3+ gram negative, preliminary results  · Monitor WBCs, fever curve  · Continue Cefepime 2 g and Vancomycin pending cultures  ·  cc/hr

## 2023-03-01 NOTE — OP NOTE
OPERATIVE REPORT  PATIENT NAME: Nicolas Spivey    :  1985  MRN: [id number]  Pt Location: [facility] OR ROOM 02    SURGERY DATE: 3/1/2023    Surgeon(s) and Role:     * Mariusz Gerber MD - Primary     * Benita Dia MD - Assisting    Preop Diagnosis:  Left buttock abscess [L02 31]    Post-Op Diagnosis Codes:     * Left buttock abscess [L02 31]    Procedure(s):  INCISION AND DRAINAGE (I&D) PERIRECTAL ABSCESS    Specimen(s):  ID Type Source Tests Collected by Time Destination   A : PERIRECTAL ABSCESS Tissue Abscess CULTURE, TISSUE AND GRAM STAIN Mariusz Gerbre MD 3/1/2023 1747    B : PERIRECTAL ABSCESS Tissue Abscess ANAEROBIC CULTURE AND GRAM STAIN Mariusz Gerber MD 3/1/2023 1747        Estimated Blood Loss:   Minimal    Drains:  * No LDAs found *    Anesthesia Type:   Choice    Operative Indications:  Left buttock abscess [L02 31]      Operative Findings:  Left buttock abscess, harshad-purulent drainage    No evidence of necrotizing soft tissue infection    2" Iodoform pack placed    Complications:   None    Procedure and Technique:  Patient was brought to the operative suite where she was identified both verbally and by wristband  She was intubated in her hospital stretcher per anesthesiology and then transferred to the OR table in the prone jackknife position for the procedure  The patient was then prepped and draped in the usual sterile fashion with Betadine  A timeout was held and all were in agreement  The previously seen drainage site of her left buttock abscess was extended in a cruciate incision using a knife  Finger probe was able to break up all loculations with a return of sanguinous purulent drainage  Cultures were taken  The area was copiously irrigated with sterile saline  Final finger probe showed no further loculations  The defect was then packed with 2 inch iodoform sterile packing  This was dressed with Kerlix fluffs, ABD      At the end of the case instrument count was correct x2 and RF wand showed no retained sponges  The patient was then transferred back to her hospital stretcher in the supine position  She was then extubated per anesthesia and transferred to PACU in stable condition      Dr Asaf Dyson was present for the entire procedure      Patient Disposition:  PACU         SIGNATURE: Narcisa Barton MD  DATE: March 1, 2023  TIME: 6:11 PM

## 2023-03-01 NOTE — ASSESSMENT & PLAN NOTE
Chronic    Patient reports her anxiety is worsening with this painful abscess  Unable to sleep d/t anxiety  Patient states she takes 40 mg melatonin HS at home which is a very high dose and this was discussed with the patient today      · Continue BuSpar 10 mg, PO, daily HS  · Melatonin 9mg HS

## 2023-03-01 NOTE — ASSESSMENT & PLAN NOTE
Pt presenting with complaints of 3 days of fevers and induration of left buttock  Refers being on period last 3 days  Rates pain 10/10 and can not sit or put pressure in the area  PE significant for induration, edema and erythema of left buttock with small linear opening actively draining brown watery fluid  VS stable  Pt took tylenol for fevers before visit  Concerned for left buttock abscess and associated cellulitis       · Sent to ED for possible admission   · Might need IV abx and surgery consult for I&D  · Also needs imaging to assess extent of infection  · Plan discussed with pt and ED called and informed

## 2023-03-01 NOTE — PLAN OF CARE
Problem: SKIN/TISSUE INTEGRITY - ADULT  Goal: Skin Integrity remains intact(Skin Breakdown Prevention)  Description: Assess:  -Perform Juan J assessment every shift   -Clean and moisturize skin every shift   -Inspect skin when repositioning, toileting, and assisting with ADLS  -Assess under medical devices such as needed every shift  -Assess extremities for adequate circulation and sensation     Bed Management:  -Have minimal linens on bed & keep smooth, unwrinkled  -Change linens as needed when moist or perspiring  -Avoid sitting or lying in one position for more than 2 hours while in bed  -Keep HOB at 30 degrees     Toileting:  -Offer bedside commode  -Assess for incontinence every 2 hours    Activity:  -Encourage activity and walks on unit  -Encourage or provide ROM exercises   -Use appropriate equipment to lift or move patient in bed    Skin Care:  -Avoid use of baby powder, tape, friction and shearing, hot water or constrictive clothing  -Do not massage red bony areas    Next Steps:  Outcome: Progressing  Goal: Incision(s), wounds(s) or drain site(s) healing without S/S of infection  Description: INTERVENTIONS  - Assess and document dressing, incision, wound bed, drain sites and surrounding tissue    Outcome: Progressing  Goal: Pressure injury heals and does not worsen  Description: Interventions:  - Implement low air loss mattress or specialty surface (Criteria met)  - Consider nutrition services referral as needed  Outcome: Progressing

## 2023-03-01 NOTE — ASSESSMENT & PLAN NOTE
Chronic    Patient reports she self discontinued Zoloft months ago, reports mood is improved  Patient reports she is taking BuSpar 5 mg twice daily    · Continue BuSpar 10 mg, Po daily, HS

## 2023-03-01 NOTE — ASSESSMENT & PLAN NOTE
Patient reports she does not use her albuterol inhaler  Reports some shortness of breath overnight consistent with baseline asthma      · DuoNeb treatment TID  · Tessalon perles prn cough   · Mucinex 600 mg, PO, BID   · Monitor O2 sats

## 2023-03-02 LAB
ALBUMIN SERPL BCP-MCNC: 3.2 G/DL (ref 3.5–5)
ALP SERPL-CCNC: 50 U/L (ref 34–104)
ALT SERPL W P-5'-P-CCNC: 7 U/L (ref 7–52)
ANION GAP SERPL CALCULATED.3IONS-SCNC: 4 MMOL/L (ref 4–13)
AST SERPL W P-5'-P-CCNC: 13 U/L (ref 13–39)
BACTERIA UR QL AUTO: ABNORMAL /HPF
BASOPHILS # BLD MANUAL: 0 THOUSAND/UL (ref 0–0.1)
BASOPHILS NFR MAR MANUAL: 0 % (ref 0–1)
BILIRUB SERPL-MCNC: 0.47 MG/DL (ref 0.2–1)
BILIRUB UR QL STRIP: NEGATIVE
BUN SERPL-MCNC: 11 MG/DL (ref 5–25)
CALCIUM ALBUM COR SERPL-MCNC: 8.7 MG/DL (ref 8.3–10.1)
CALCIUM SERPL-MCNC: 8.1 MG/DL (ref 8.4–10.2)
CHLORIDE SERPL-SCNC: 108 MMOL/L (ref 96–108)
CLARITY UR: ABNORMAL
CO2 SERPL-SCNC: 24 MMOL/L (ref 21–32)
COLOR UR: YELLOW
CREAT SERPL-MCNC: 0.68 MG/DL (ref 0.6–1.3)
EOSINOPHIL # BLD MANUAL: 0 THOUSAND/UL (ref 0–0.4)
EOSINOPHIL NFR BLD MANUAL: 0 % (ref 0–6)
ERYTHROCYTE [DISTWIDTH] IN BLOOD BY AUTOMATED COUNT: 13.5 % (ref 11.6–15.1)
EST. AVERAGE GLUCOSE BLD GHB EST-MCNC: 91 MG/DL
GFR SERPL CREATININE-BSD FRML MDRD: 111 ML/MIN/1.73SQ M
GLUCOSE SERPL-MCNC: 114 MG/DL (ref 65–140)
GLUCOSE SERPL-MCNC: 118 MG/DL (ref 65–140)
GLUCOSE SERPL-MCNC: 124 MG/DL (ref 65–140)
GLUCOSE SERPL-MCNC: 95 MG/DL (ref 65–140)
GLUCOSE UR STRIP-MCNC: NEGATIVE MG/DL
HBA1C MFR BLD: 4.8 %
HCT VFR BLD AUTO: 35 % (ref 34.8–46.1)
HGB BLD-MCNC: 11.7 G/DL (ref 11.5–15.4)
HGB UR QL STRIP.AUTO: ABNORMAL
KETONES UR STRIP-MCNC: NEGATIVE MG/DL
LEUKOCYTE ESTERASE UR QL STRIP: NEGATIVE
LG PLATELETS BLD QL SMEAR: PRESENT
LYMPHOCYTES # BLD AUTO: 0.78 THOUSAND/UL (ref 0.6–4.47)
LYMPHOCYTES # BLD AUTO: 4 % (ref 14–44)
MAGNESIUM SERPL-MCNC: 2 MG/DL (ref 1.9–2.7)
MCH RBC QN AUTO: 32.7 PG (ref 26.8–34.3)
MCHC RBC AUTO-ENTMCNC: 33.4 G/DL (ref 31.4–37.4)
MCV RBC AUTO: 98 FL (ref 82–98)
MONOCYTES # BLD AUTO: 0.78 THOUSAND/UL (ref 0–1.22)
MONOCYTES NFR BLD: 4 % (ref 4–12)
NEUTROPHILS # BLD MANUAL: 17.96 THOUSAND/UL (ref 1.85–7.62)
NEUTS BAND NFR BLD MANUAL: 7 % (ref 0–8)
NEUTS SEG NFR BLD AUTO: 85 % (ref 43–75)
NITRITE UR QL STRIP: NEGATIVE
NON-SQ EPI CELLS URNS QL MICRO: ABNORMAL /HPF
PH UR STRIP.AUTO: 6 [PH]
PLATELET # BLD AUTO: 202 THOUSANDS/UL (ref 149–390)
PLATELET BLD QL SMEAR: ADEQUATE
PMV BLD AUTO: 10.6 FL (ref 8.9–12.7)
POTASSIUM SERPL-SCNC: 3.7 MMOL/L (ref 3.5–5.3)
PROT SERPL-MCNC: 6.2 G/DL (ref 6.4–8.4)
PROT UR STRIP-MCNC: NEGATIVE MG/DL
RBC # BLD AUTO: 3.58 MILLION/UL (ref 3.81–5.12)
RBC #/AREA URNS AUTO: ABNORMAL /HPF
RBC MORPH BLD: NORMAL
SODIUM SERPL-SCNC: 136 MMOL/L (ref 135–147)
SP GR UR STRIP.AUTO: 1.01 (ref 1–1.03)
UROBILINOGEN UR QL STRIP.AUTO: >=8 E.U./DL
WBC # BLD AUTO: 19.52 THOUSAND/UL (ref 4.31–10.16)
WBC #/AREA URNS AUTO: ABNORMAL /HPF

## 2023-03-02 RX ORDER — LANOLIN ALCOHOL/MO/W.PET/CERES
9 CREAM (GRAM) TOPICAL
Status: DISCONTINUED | OUTPATIENT
Start: 2023-03-02 | End: 2023-03-04 | Stop reason: HOSPADM

## 2023-03-02 RX ORDER — POLYETHYLENE GLYCOL 3350 17 G/17G
17 POWDER, FOR SOLUTION ORAL ONCE
Status: COMPLETED | OUTPATIENT
Start: 2023-03-02 | End: 2023-03-02

## 2023-03-02 RX ORDER — BUSPIRONE HYDROCHLORIDE 10 MG/1
10 TABLET ORAL
Status: DISCONTINUED | OUTPATIENT
Start: 2023-03-02 | End: 2023-03-04 | Stop reason: HOSPADM

## 2023-03-02 RX ORDER — POTASSIUM CHLORIDE 20 MEQ/1
40 TABLET, EXTENDED RELEASE ORAL ONCE
Status: COMPLETED | OUTPATIENT
Start: 2023-03-02 | End: 2023-03-02

## 2023-03-02 RX ORDER — LANOLIN ALCOHOL/MO/W.PET/CERES
6 CREAM (GRAM) TOPICAL
Status: DISCONTINUED | OUTPATIENT
Start: 2023-03-02 | End: 2023-03-02

## 2023-03-02 RX ORDER — ENOXAPARIN SODIUM 100 MG/ML
40 INJECTION SUBCUTANEOUS
Status: DISCONTINUED | OUTPATIENT
Start: 2023-03-02 | End: 2023-03-04 | Stop reason: HOSPADM

## 2023-03-02 RX ADMIN — BUSPIRONE HYDROCHLORIDE 10 MG: 10 TABLET ORAL at 20:46

## 2023-03-02 RX ADMIN — IPRATROPIUM BROMIDE AND ALBUTEROL SULFATE 3 ML: 2.5; .5 SOLUTION RESPIRATORY (INHALATION) at 07:17

## 2023-03-02 RX ADMIN — POTASSIUM CHLORIDE 40 MEQ: 1500 TABLET, EXTENDED RELEASE ORAL at 08:47

## 2023-03-02 RX ADMIN — MELATONIN TAB 3 MG 9 MG: 3 TAB at 20:47

## 2023-03-02 RX ADMIN — BENZONATATE 100 MG: 100 CAPSULE ORAL at 13:22

## 2023-03-02 RX ADMIN — VANCOMYCIN HYDROCHLORIDE 1250 MG: 10 INJECTION, POWDER, LYOPHILIZED, FOR SOLUTION INTRAVENOUS at 20:44

## 2023-03-02 RX ADMIN — NICOTINE 1 PATCH: 14 PATCH, EXTENDED RELEASE TRANSDERMAL at 15:12

## 2023-03-02 RX ADMIN — VANCOMYCIN HYDROCHLORIDE 1250 MG: 10 INJECTION, POWDER, LYOPHILIZED, FOR SOLUTION INTRAVENOUS at 08:47

## 2023-03-02 RX ADMIN — SODIUM CHLORIDE, SODIUM LACTATE, POTASSIUM CHLORIDE, AND CALCIUM CHLORIDE 100 ML/HR: .6; .31; .03; .02 INJECTION, SOLUTION INTRAVENOUS at 07:00

## 2023-03-02 RX ADMIN — IPRATROPIUM BROMIDE AND ALBUTEROL SULFATE 3 ML: 2.5; .5 SOLUTION RESPIRATORY (INHALATION) at 20:49

## 2023-03-02 RX ADMIN — ACETAMINOPHEN 650 MG: 325 TABLET, FILM COATED ORAL at 13:22

## 2023-03-02 RX ADMIN — POLYETHYLENE GLYCOL 3350 17 G: 17 POWDER, FOR SOLUTION ORAL at 08:47

## 2023-03-02 RX ADMIN — ENOXAPARIN SODIUM 40 MG: 40 INJECTION SUBCUTANEOUS at 11:50

## 2023-03-02 RX ADMIN — BENZONATATE 100 MG: 100 CAPSULE ORAL at 06:44

## 2023-03-02 RX ADMIN — IPRATROPIUM BROMIDE AND ALBUTEROL SULFATE 3 ML: 2.5; .5 SOLUTION RESPIRATORY (INHALATION) at 15:05

## 2023-03-02 RX ADMIN — Medication 2000 MG: at 11:50

## 2023-03-02 RX ADMIN — SODIUM CHLORIDE, SODIUM LACTATE, POTASSIUM CHLORIDE, AND CALCIUM CHLORIDE 100 ML/HR: .6; .31; .03; .02 INJECTION, SOLUTION INTRAVENOUS at 20:42

## 2023-03-02 RX ADMIN — NICOTINE 1 PATCH: 14 PATCH, EXTENDED RELEASE TRANSDERMAL at 20:43

## 2023-03-02 NOTE — PLAN OF CARE
Problem: SKIN/TISSUE INTEGRITY - ADULT  Goal: Skin Integrity remains intact(Skin Breakdown Prevention)  Description: Assess:  -Perform Juan J assessment every shift  -Clean and moisturize skin every shift  -Inspect skin when repositioning, toileting, and assisting with ADLS  -Assess under medical devices such as iv every shift  -Assess extremities for adequate circulation and sensation     Bed Management:  -Have minimal linens on bed & keep smooth, unwrinkled  -Change linens as needed when moist or perspiring  -Avoid sitting or lying in one position for more than 2 hours while in bed  -Keep HOB at 30 degrees     Toileting:  -Offer bedside commode  -Assess for incontinence every shift  -Use incontinent care products after each incontinent episode such as soap and water    Activity:  -Mobilize patient 3 times a day  -Encourage activity and walks on unit  -Encourage or provide ROM exercises   -Turn and reposition patient every 2 Hours  -Use appropriate equipment to lift or move patient in bed  -Instruct/ Assist with weight shifting every 2 hours when out of bed in chair  -Consider limitation of chair time 2 hour intervals    Skin Care:  -Avoid use of baby powder, tape, friction and shearing, hot water or constrictive clothing  -Relieve pressure over bony prominences using allevyn  -Do not massage red bony areas    Next Steps:  -Teach patient strategies to minimize risks such as fall risk   -Consider consults to  interdisciplinary teams such as Pt/OT   Outcome: Progressing  Goal: Incision(s), wounds(s) or drain site(s) healing without S/S of infection  Description: INTERVENTIONS  - Assess and document dressing, incision, wound bed, drain sites and surrounding tissue  - Provide patient and family education  - Perform skin care/dressing changes every shift  Outcome: Progressing  Goal: Pressure injury heals and does not worsen  Description: Interventions:  - Implement low air loss mattress or specialty surface (Criteria met)  - Apply silicone foam dressing  - Instruct/assist with weight shifting every 120 minutes when in chair   - Limit chair time to 2 hour intervals  - Use special pressure reducing interventions such as waffle cushion when in chair   - Apply fecal or urinary incontinence containment device   - Perform passive or active ROM every shift  - Turn and reposition patient & offload bony prominences every 2 hours   - Utilize friction reducing device or surface for transfers   - Consider consults to  interdisciplinary teams such as PT/OT   - Use incontinent care products after each incontinent episode such as soap and water  - Consider nutrition services referral as needed  Outcome: Progressing     Problem: PAIN - ADULT  Goal: Verbalizes/displays adequate comfort level or baseline comfort level  Description: Interventions:  - Encourage patient to monitor pain and request assistance  - Assess pain using appropriate pain scale  - Administer analgesics based on type and severity of pain and evaluate response  - Implement non-pharmacological measures as appropriate and evaluate response  - Consider cultural and social influences on pain and pain management  - Notify physician/advanced practitioner if interventions unsuccessful or patient reports new pain  Outcome: Progressing     Problem: INFECTION - ADULT  Goal: Absence or prevention of progression during hospitalization  Description: INTERVENTIONS:  - Assess and monitor for signs and symptoms of infection  - Monitor lab/diagnostic results  - Monitor all insertion sites, i e  indwelling lines, tubes, and drains  - Monitor endotracheal if appropriate and nasal secretions for changes in amount and color  - [place] appropriate cooling/warming therapies per order  - Administer medications as ordered  - Instruct and encourage patient and family to use good hand hygiene technique  - Identify and instruct in appropriate isolation precautions for identified infection/condition  Outcome: Progressing  Goal: Absence of fever/infection during neutropenic period  Description: INTERVENTIONS:  - Monitor WBC    Outcome: Progressing     Problem: RESPIRATORY - ADULT  Goal: Achieves optimal ventilation and oxygenation  Description: INTERVENTIONS:  - Assess for changes in respiratory status  - Assess for changes in mentation and behavior  - Position to facilitate oxygenation and minimize respiratory effort  - Oxygen administered by appropriate delivery if ordered  - Initiate smoking cessation education as indicated  - Encourage broncho-pulmonary hygiene including cough, deep breathe, Incentive Spirometry  - Assess the need for suctioning and aspirate as needed  - Assess and instruct to report SOB or any respiratory difficulty  - Respiratory Therapy support as indicated  Outcome: Progressing

## 2023-03-02 NOTE — QUICK NOTE
Was notified by RN that patient is reporting sore throat  Patient was intubated for emergent incision and drainage of gluteal abscess last night   On evaluation patient reports mild pain with swallowing after getting out of the shower prior to alerting her RN  Patient states she tolerated her breakfast without difficulty  On examination O2 sat 98% on RA, no tachypnea or tachycardia, afebrile, speaking in full sentences in no acute cardiopulmonary distress  Voice normal, no hoarseness  Mild pharyngeal erythema noted, uvula midline  No exudate  On lung examination, patient has decreased breath sounds on the right middle and lower lobes  No significant wheezing or stridor  Overall patient appears clinically stable  She reports she was given some cough drops and will see if this helps  Assessment/Plan  Likely mild pharyngeal erythema/edema post intubation  Patient is hemodynamically stable, in no acute cardiorespiratory distress  No significant wheezing or stridor noted  Patient was given Halls/Menthol cough drops at bedside by RN for symptomatic treatment  Will continue to closely monitor and reassess vital signs and respiratory status given obesity and newly diagnosed COPD per RT      To be co-signed by Dr oCdy Lloyd

## 2023-03-02 NOTE — DISCHARGE INSTR - AVS FIRST PAGE
Wound Care:     No packing required  Dry dressing for comfort and to control drainage onto clothing  Sitz bath's up to 3 times daily as needed for comfort  OK to shower, NO SOAKING in tub  Sitz Bath's OK  Please call the surgical office if any fevers, chills or purulent/foul smelling drainage recurs or increases    Antibiotics therapy:     Complete 8 day course of:     Doxycycline 100 mg, PO, Q12H  Amoxicillin - Clavulanate 875 - 125 mg, PO, Q12H     Follow up in [facility] in 7 - 14 days for TCM visit and complete repeat CMP/CBC  Follow up with Surgery for wound check  Call to schedule visit

## 2023-03-02 NOTE — PLAN OF CARE
Problem: SKIN/TISSUE INTEGRITY - ADULT  Goal: Incision(s), wounds(s) or drain site(s) healing without S/S of infection  Description: INTERVENTIONS  - Assess and document dressing, incision, wound bed, drain sites and surrounding tissue  - Provide patient and family education  Outcome: Progressing     Problem: PAIN - ADULT  Goal: Verbalizes/displays adequate comfort level or baseline comfort level  Description: Interventions:  - Encourage patient to monitor pain and request assistance  - Assess pain using appropriate pain scale  - Administer analgesics based on type and severity of pain and evaluate response  - Implement non-pharmacological measures as appropriate and evaluate response  - Consider cultural and social influences on pain and pain management  - Notify physician/advanced practitioner if interventions unsuccessful or patient reports new pain  Outcome: Progressing     Problem: INFECTION - ADULT  Goal: Absence or prevention of progression during hospitalization  Description: INTERVENTIONS:  - Assess and monitor for signs and symptoms of infection  - Monitor lab/diagnostic results  - Monitor all insertion site  - Administer medications as ordered  - Instruct and encourage patient and family to use good hand hygiene technique  Outcome: Progressing     Problem: INFECTION - ADULT  Goal: Absence of fever/infection during neutropenic period  Description: INTERVENTIONS:  - Monitor WBC    Outcome: Progressing

## 2023-03-02 NOTE — ASSESSMENT & PLAN NOTE
Patient was given diagnosis of COPD during RT evaluation  O2 sat 97% on RA     · Continue Duonebs, mucinex and Tessalon perles  · Follow up pulmonology on discharge

## 2023-03-02 NOTE — PROGRESS NOTES
Anabell Lino is a 44 y o  female who is currently ordered Vancomycin IV with management by the Pharmacy Consult service  Relevant clinical data and objective / subjective history reviewed  Vancomycin Assessment:  Indication and Goal AUC/Trough: Soft tissue (goal -600, trough >10), -600, trough >10  Clinical Status: stable  Micro:   pending  Renal Function:  SCr: 0 68 mg/dL  CrCl: 140 5 mL/min  Renal replacement: Not on dialysis  Days of Therapy: 2  Current Dose: 1250 mg IV q 12 h  Vancomycin Plan:  Continue 1250 mg IV q 12 h  Estimated AUC: 462 mcg*hr/mL  Estimated Trough: 14 4 mcg/mL  Next Level: 3/3/23 @0600  Renal Function Monitoring: Daily BMP and [place] will continue to follow closely for s/sx of nephrotoxicity, infusion reactions and appropriateness of therapy  BMP and CBC will be ordered per protocol  We will continue to follow the patient’s culture results and clinical progress daily      [address] , [address], Pharmacist

## 2023-03-02 NOTE — UTILIZATION REVIEW
Initial Clinical Review    Admission: Date/Time/Statement:   Admission Orders (From admission, onward)     Ordered        03/01/23 1310  INPATIENT ADMISSION  Once                      Orders Placed This Encounter   Procedures   • INPATIENT ADMISSION     Standing Status:   Standing     Number of Occurrences:   1     Order Specific Question:   Level of Care     Answer:   Med Surg [16]     Order Specific Question:   Estimated length of stay     Answer:   More than 2 Midnights     Order Specific Question:   Certification     Answer:   I certify that inpatient services are medically necessary for this patient for a duration of greater than two midnights  See H&P and MD Progress Notes for additional information about the patient's course of treatment  ED Arrival Information     Expected   -    Arrival   3/1/2023 10:12    Acuity   Urgent            Means of arrival   Walk-In    Escorted by   Self    Service   Family Medicine    Admission type   Emergency            Arrival complaint   infected abscess, buttock           Chief Complaint   Patient presents with   • Abscess     Painful abscess on buttocks       Initial Presentation:   44 yof to ER from PCP for evaluation of perineal abscess  Reports  chills, increased pain & pressure L perineal area into buttocks anteriorly  She states there was a "cyst" in the area for about a year before this & has had problems with "boils" on other parts of her body  Patient states she put a "drawing salve" on it and it spontaneously opened and started draining foul-smelling pus  Presents febrile, tachycardic & tachypneic with extensive cellulitis in the left gluteus extending to the perineum  There is a sinus opening draining elsa pus in the anterior perineum on the left  Tender to palpation  Admission work-up showing cellulitis L buttock on imaging, leukocytosis  Admitted to inpatient status for sepsis  Surgery consulted & pt taken to OR   Started on double IVABT, cultures pending  To OR for: INCISION AND DRAINAGE (I&D) PERIRECTAL ABSCESS  Anesthesia Type: Choice  Operative Indications: Left buttock abscess   Operative Findings:  Left buttock abscess, harshad-purulent drainage  No evidence of necrotizing soft tissue infection  2" Iodoform pack placed    Date: 3/2/23   Day 2:   IVABT in progress s/p I&D perirectal abscess, final cultures pending  Packing removed & dry dressing applied by surgeon  Ruba bowie ordered  Exam: Buttock - left buttock wound is clean, the packing was removed and is hemostatic, still some induration however it is mostly non tender      ED Triage Vitals   Temperature Pulse Respirations Blood Pressure SpO2   03/01/23 1110 03/01/23 1110 03/01/23 1110 03/01/23 1113 03/01/23 1110   100 2 °F (37 9 °C) 101 (!) 24 160/97 99 %      Temp Source Heart Rate Source Patient Position - Orthostatic VS BP Location FiO2 (%)   03/01/23 1110 03/01/23 1110 03/01/23 1110 03/01/23 1110 --   Tympanic Monitor Sitting Right arm       Pain Score       03/01/23 1110       10 - Worst Possible Pain          Wt Readings from Last 1 Encounters:   03/01/23 107 kg (235 lb)     Additional Vital Signs:   03/02/23 07:49:45 97 6 °F (36 4 °C) 71 18 129/58 82 95 % -- None (Room air) -- Sitting   03/02/23 0730 -- -- -- -- -- 98 % -- -- -- --   03/02/23 0717 -- 86 18 -- -- 96 % -- -- -- --   03/02/23 03:44:08 97 3 °F (36 3 °C) Abnormal  59 18 104/56 72 96 % -- None (Room air) -- --   03/01/23 22:24:16 97 9 °F (36 6 °C) 79 19 133/83 100 96 % -- None (Room air) -- Lying   03/01/23 2043 -- -- -- -- -- 95 % -- None (Room air) -- --   03/01/23 18:50:11 98 7 °F (37 1 °C) 89 19 125/98 107 96 % -- -- -- --   03/01/23 1825 -- 93 16 127/58 -- 95 % -- None (Room air) -- --   03/01/23 1810 100 1 °F (37 8 °C) 88 16 133/61 -- 99 % 6 L/min Simple mask WDL --   03/01/23 16:14:01 100 2 °F (37 9 °C) 92 20 107/53 71 96 % -- -- -- --   03/01/23 1518 98 7 °F (37 1 °C) 97 22 126/71 -- 96 % -- None (Room air) -- Sitting 03/01/23 15:11:06 99 9 °F (37 7 °C) 86 -- 126/71 89 96 % -- -- -- --   03/01/23 1113 -- -- -- 160/97 -- -- -- -- -- Sitting   03/01/23 1110 100 2 °F (37 9 °C) 101 24 Abnormal  -- -- 99 % -- None (Room air) -- Sitting     Pertinent Labs/Diagnostic Test Results:   CT abdomen pelvis with contrast   Final Result (03/01 1513)      Extensive cellulitis of the left buttock region extending from the anal verge with droplets of gas which suggests gas-forming organisms  Small collection measuring 2 cm may be evident  No evidence of bowel dilatation or inflammatory change  Multiple gallstones  XR chest 1 view portable   Final Result  (03/02 0742)      No acute cardiopulmonary disease             Results from last 7 days   Lab Units 03/01/23  1302   SARS-COV-2  Negative     Results from last 7 days   Lab Units 03/02/23  0538 03/01/23  1218   WBC Thousand/uL 19 52* 24 95*   HEMOGLOBIN g/dL 11 7 13 8   HEMATOCRIT % 35 0 40 4   PLATELETS Thousands/uL 202 255   BANDS PCT % 7 24*       Results from last 7 days   Lab Units 03/02/23  0538 03/01/23  1218   SODIUM mmol/L 136 136   POTASSIUM mmol/L 3 7 3 6   CHLORIDE mmol/L 108 104   CO2 mmol/L 24 23   ANION GAP mmol/L 4 9   BUN mg/dL 11 11   CREATININE mg/dL 0 68 0 82   EGFR ml/min/1 73sq m 111 91   CALCIUM mg/dL 8 1* 9 0   MAGNESIUM mg/dL 2 0  --      Results from last 7 days   Lab Units 03/02/23  0538 03/01/23  1218   AST U/L 13 10*   ALT U/L 7 7   ALK PHOS U/L 50 52   TOTAL PROTEIN g/dL 6 2* 7 1   ALBUMIN g/dL 3 2* 3 9   TOTAL BILIRUBIN mg/dL 0 47 0 73     Results from last 7 days   Lab Units 03/02/23  1113 03/02/23  0722   POC GLUCOSE mg/dl 118 114     Results from last 7 days   Lab Units 03/02/23  0538 03/01/23  1218   GLUCOSE RANDOM mg/dL 124 93       Results from last 7 days   Lab Units 03/01/23  1218   HEMOGLOBIN A1C % 4 8   EAG mg/dl 91       Results from last 7 days   Lab Units 03/01/23  1218   PROTIME seconds 15 5*   INR  1 22*   PTT seconds 32 Results from last 7 days   Lab Units 03/01/23  1218   LACTIC ACID mmol/L 0 9       Results from last 7 days   Lab Units 03/02/23  0502   CLARITY UA  Slightly Cloudy   COLOR UA  Yellow   SPEC GRAV UA  1 010   PH UA  6 0   GLUCOSE UA mg/dl Negative   KETONES UA mg/dl Negative   BLOOD UA  Moderate*   PROTEIN UA mg/dl Negative   NITRITE UA  Negative   BILIRUBIN UA  Negative   UROBILINOGEN UA E U /dl >=8 0*   LEUKOCYTES UA  Negative   WBC UA /hpf 1-2   RBC UA /hpf 1-2   BACTERIA UA /hpf Moderate*   EPITHELIAL CELLS WET PREP /hpf Occasional       Results from last 7 days   Lab Units 03/01/23  1747 03/01/23  1219 03/01/23  1218   BLOOD CULTURE   --  Received in Microbiology Lab  Culture in Progress  Received in Microbiology Lab  Culture in Progress     GRAM STAIN RESULT  1+ Polys*  3+ Gram negative rods*  2+ Gram positive rods*  1+ Gram positive cocci in pairs*  --   --    WOUND CULTURE   --  Culture too young- will reincubate  --        ED Treatment:   Medication Administration from 03/01/2023 1012 to 03/01/2023 1505       Date/Time Order Dose Route Action     03/01/2023 1223 EST sodium chloride 0 9 % bolus 1,000 mL 1,000 mL Intravenous New Bag     03/01/2023 1322 EST vancomycin (VANCOCIN) 1,250 mg in sodium chloride 0 9 % 250 mL IVPB 1,250 mg Intravenous New Bag     03/01/2023 1224 EST cefepime (MAXIPIME) IVPB (premix in dextrose) 2,000 mg 50 mL 2,000 mg Intravenous New Bag     03/01/2023 1234 EST HYDROmorphone (DILAUDID) injection 1 mg 1 mg Intravenous Given     03/01/2023 1411 EST iohexol (OMNIPAQUE) 350 MG/ML injection (SINGLE-DOSE) 100 mL 100 mL Intravenous Given        Past Medical History:   Diagnosis Date   • Asthma    • [facility]    • Macular degeneration     right eye   • Periodontitis    • Psychiatric disorder     depression, anxiety     Present on Admission:  • Left buttock abscess  • Asthma  • Sepsis ([facility] )      Admitting Diagnosis: Cellulitis and abscess of buttock [L02 31, L03 317]  Abscess [L02 91]  Left buttock abscess [L02 31]  Age/Sex: 44 y o  female  Admission Orders:  Pt/ot eval & tx  Scd/foot pumps  Consult surgery  Sitz baths    Scheduled Medications:  busPIRone, 5 mg, Oral, BID  cefepime, 2,000 mg, Intravenous, Q12H  enoxaparin (LOVENOX) subcutaneous injection 40 mg, Daily  ipratropium-albuterol, 3 mL, Nebulization, TID  melatonin, 6 mg, Oral, HS  nicotine, 1 patch, Transdermal, Daily  polyethylene glycol, 17 g, Oral, Once  potassium chloride, 40 mEq, Oral, Once  vancomycin, 1,250 mg, Intravenous, Q12H    Continuous IV Infusions:  lactated ringers, 100 mL/hr, Intravenous, Continuous    PRN Meds:  acetaminophen, 650 mg, Oral, Q6H PRN  benzonatate, 100 mg, Oral, TID PRN    Network Utilization Review Department  ATTENTION: Please call with any questions or concerns to [phone] and carefully listen to the prompts so that you are directed to the right person  All voicemails are confidential   Jalyn Vega all requests for admission clinical reviews, approved or denied determinations and any other requests to dedicated fax number below belonging to the campus where the patient is receiving treatment   List of dedicated fax numbers for the Facilities:  [address] DENIALS (Administrative/Medical Necessity) [phone]   [address] (Maternity/NICU/Pediatrics) [phone]   [address] Kristine Castillo [phone]   [facility] [address] [phone]   [address] [place] [address] [facility] [place] [address] [facility] 28 [phone]   [address] [facility] 134 1102 [facility] [phone]

## 2023-03-02 NOTE — PHYSICAL THERAPY NOTE
03/02/23 1546   Note Type   Note type Screen   Cancel Reasons Other   Additional Comments PT orders received and chart reviewed  Per patient's RN, patient is completely independent and has no skilled PT needs  Will DC inpatient PT orders     Licensure   [place] License Number  [address] [place] [address] [id number]

## 2023-03-02 NOTE — PROGRESS NOTES
Progress Note - General Surgery   Wilma Weiss 44 y o  female MRN: [id number]  Unit/Bed#: [address] [place] [address] Encounter: [id number]    Assessment:  44 y o  female w/ left buttock abscess 1 Day Post-Op s/p Procedure(s) (LRB):  INCISION AND DRAINAGE (I&D) PERIRECTAL ABSCESS (N/A)        Plan:  Diet as tolerated  OK to shower  Sitz bath's ordered, will need to have these done up to 3 times daily PRN at home for the next few days    Abx per primary team, follow up cultures    Packing removed bedside today and dry dressing placed  Patient cleared surgically for discharge, can follow up in Dr Alana Narvaez office on Monday 3/6 for wound check  PRN if questions or complications arise    Subjective/Objective   Chief Complaint:     Subjective: no acute events  Feels much better, able to sit down, walk, tolerating pain  Eating and drinking fine    Objective:     Blood pressure 129/58, pulse 71, temperature 97 6 °F (36 4 °C), temperature source Oral, resp  rate 18, height 5' 7" (1 702 m), weight 107 kg (235 lb), last menstrual period 02/26/2023, SpO2 95 %, not currently breastfeeding  ,Body mass index is 36 81 kg/m²  Intake/Output Summary (Last 24 hours) at 3/2/2023 1058  Last data filed at 3/2/2023 0645  Gross per 24 hour   Intake 1743 38 ml   Output --   Net 1743 38 ml       Invasive Devices     Peripheral Intravenous Line  Duration           Peripheral IV 03/02/23 Right;Ventral (anterior) Forearm <1 day                Physical Exam: General: AAOx3  Head: normocephalic, atraumatic  Neck: supple, trachea midline  Respiratory: BS b/l  Abdomen: Soft, NT, no rebound/guarding  Heart: RRR, S1s2  Ext: Warm no cyanosis   Buttock - left buttock wound is clean, the packing was removed and is hemostatic, still some induration however it is mostly non tender      Lab, Imaging and other studies:  I have personally reviewed pertinent lab results    , CBC:   Lab Results   Component Value Date    WBC 19 52 (H) 03/02/2023    HGB 11 7 03/02/2023 HCT 35 0 03/02/2023    MCV 98 03/02/2023     03/02/2023    MCH 32 7 03/02/2023    MCHC 33 4 03/02/2023    RDW 13 5 03/02/2023    MPV 10 6 03/02/2023   , CMP:   Lab Results   Component Value Date    SODIUM 136 03/02/2023    K 3 7 03/02/2023     03/02/2023    CO2 24 03/02/2023    BUN 11 03/02/2023    CREATININE 0 68 03/02/2023    CALCIUM 8 1 (L) 03/02/2023    AST 13 03/02/2023    ALT 7 03/02/2023    ALKPHOS 50 03/02/2023    EGFR 111 03/02/2023     VTE Pharmacologic Prophylaxis: Heparin  VTE Mechanical Prophylaxis: sequential compression device

## 2023-03-02 NOTE — PROGRESS NOTES
Daily Progress Note - [facility]'s [address] [address]  44 y o  female MRN: [id number]  Unit/Bed#: [address] Encounter: [id number]  Admitting Physician: Enid Souza DO   PCP: Cornel Holloway MD  Date of Admission:  3/1/2023 11:40 AM    Assessment and Plan:    * Sepsis [facility])  Assessment & Plan  POA, significant leukocytosis, tachycardia, and tachypnea  Self reported fever at home of 103F  Has been afebrile in the hospital  Patient received bolus IV fluids in the ED  · Blood cultures and wound cultures pending  · Monitor WBCs, fever curve  · Continue Cefepime 2 g and Vancomycin pending cultures  ·  cc/hr per surgery    Left buttock abscess  Assessment & Plan  POA,  pain over the left buttock, leukocytosis noted on admission  Reports fevers at home (Tmax 103)  CT showed extensive cellulitis of the left buttock region and extending into the anal verge with droplets of gas  · Patient much improved since I&D yesterday  · Continue cefepime and vancomycin pending cultures  · Leukocytosis improving, trend WBC  · Continue to monitor wound status  · Tylenol 650 mg prn pain  · Resume diet  · Pt without BM for 3 days, will add miralax to avoid straining  · Surgery consulted    Gastroesophageal reflux disease  Assessment & Plan  Patient not taking Pepcid 20 mg    Smoking  Assessment & Plan  Patient reports smoking half pack per day  · Nicotine patch in place    Anxiety  Assessment & Plan  Chronic    Patient reports her anxiety is worsening with this painful abscess  Unable to sleep d/t anxiety  Patient states she takes 40 mg melatonin HS at home which is a very high dose and this was discussed with the patient today      · Continue BuSpar 5 mg twice daily  · Melatonin 6mg HS    Depression  Assessment & Plan  Chronic    Patient reports she self discontinued Zoloft months ago, reports mood is improved  Patient reports she is taking BuSpar 5 mg twice daily  · Continue BuSpar 5 mg twice daily    Asthma  Assessment & Plan  Patient reports she does not use her albuterol inhaler  Reports some shortness of breath and cough overnight consistent with baseline asthma  · DuoNeb ordered TID  · Tessalon perles ordered prn cough   · Monitor O2 sats        VTE Pharmacologic Prophylaxis: VTE Score: 3 Moderate Risk (Score 3-4) - Pharmacological DVT Prophylaxis Ordered: enoxaparin (Lovenox)  Patient Centered Rounds: I have performed bedside rounds with nursing staff today  Discussions with Specialists or Other Care Team Provider: Surgery      Time Spent for Care: 20 minutes  More than 50% of total time spent on counseling and coordination of care as described above  Current Length of Stay: 1 day(s)  Current Patient Status: Inpatient   Certification Statement: The patient will continue to require additional inpatient hospital stay due to abscess and sepsis requiring IV abx  Code Status: Level 1 - Full Code    Subjective:   Patient seen at bedside and reports feeling much better than yesterday  She is in a minimal amount of pain since the I&D yesterday and is able to put pressure on the wound while sitting  Tylenol given last night and patient did not request any additional pain medication this morning  Reports not sleeping well last night d/t anxiety and not taking her melatonin  She says she takes 40mg at night  Has an appetite and had dinner after her procedure last night  Is drinking fluids  Passed gas this morning but no BM for 2-3 days since it was too painful with the abscess  Says she has constipation at baseline  Urinating without problem  Has been up and out of bed with the nurse  Denies fevers, chills, chest pain, palpitations, shortness of breath, nausea, vomiting, diarrhea, dysuria, numbness, tingling, or leg pain       Objective     Objective:   Vitals:   Temp (24hrs), Av °F (37 2 °C), Min:97 3 °F (36 3 °C), Max:100 2 °F (37 9 °C)    Temp:  [97 3 °F (36 3 °C)-100 2 °F (37 9 °C)] 97 6 °F (36 4 °C)  HR:  [] 71  Resp:  [16-24] 18  BP: (104-160)/(53-98) 129/58  SpO2:  [95 %-99 %] 95 %  Body mass index is 36 81 kg/m²  Input and Output Summary (last 24 hours): Intake/Output Summary (Last 24 hours) at 3/2/2023 1100  Last data filed at 3/2/2023 0645  Gross per 24 hour   Intake 1743 38 ml   Output --   Net 1743 38 ml       Physical Exam:   Physical Exam  Constitutional:       General: She is not in acute distress  Appearance: She is not toxic-appearing  Cardiovascular:      Rate and Rhythm: Normal rate and regular rhythm  Heart sounds: No murmur heard  No friction rub  No gallop  Pulmonary:      Effort: Pulmonary effort is normal  No respiratory distress  Breath sounds: Wheezing (minimal at bases b/l) present  Abdominal:      General: Bowel sounds are normal  There is no distension  Palpations: Abdomen is soft  Tenderness: There is no abdominal tenderness  There is no guarding  Musculoskeletal:         General: No swelling or tenderness  Skin:     General: Skin is warm and dry  Findings: No rash  Comments: Perianal wound of left buttock packed with iodoform dressing  Small amount of yellow/tan drainage  Wound area is mildly tender with minimal surrounding erythema   Neurological:      Mental Status: She is alert     Psychiatric:         Mood and Affect: Mood normal          Behavior: Behavior normal            Additional Data:     Labs:  Results from last 7 days   Lab Units 03/02/23  0538   WBC Thousand/uL 19 52*   HEMOGLOBIN g/dL 11 7   HEMATOCRIT % 35 0   PLATELETS Thousands/uL 202   LYMPHO PCT % 4*   MONO PCT % 4   EOS PCT % 0     Results from last 7 days   Lab Units 03/02/23  0538   POTASSIUM mmol/L 3 7   CHLORIDE mmol/L 108   CO2 mmol/L 24   BUN mg/dL 11   CREATININE mg/dL 0 68   CALCIUM mg/dL 8 1*   ALK PHOS U/L 50   ALT U/L 7   AST U/L 13     Results from last 7 days   Lab Units 03/01/23  1218   INR  1 22*     Results from last 7 days   Lab Units 03/02/23  0722   POC GLUCOSE mg/dl 114     Results from last 7 days   Lab Units 03/01/23  1218   HEMOGLOBIN A1C % 4 8       * I Have Reviewed All Lab Data Listed Above  * Additional Pertinent Lab Tests Reviewed: All Labs Within Last 24 Hours Reviewed    Imaging:  Imaging Reports Reviewed Today Include: N/A    Recent Cultures (last 7 days):     Results from last 7 days   Lab Units 03/01/23  1747 03/01/23  1219 03/01/23  1218   BLOOD CULTURE   --  Received in Microbiology Lab  Culture in Progress  Received in Microbiology Lab  Culture in Progress  GRAM STAIN RESULT  1+ Polys*  3+ Gram negative rods*  2+ Gram positive rods*  1+ Gram positive cocci in pairs*  --   --        Last 24 Hours Medication List:   Current Facility-Administered Medications   Medication Dose Route Frequency Provider Last Rate   • acetaminophen  650 mg Oral Q6H PRN Yolanda Garcia MD     • benzonatate  100 mg Oral TID PRN Riri Bruce MD     • busPIRone  10 mg Oral HS Manolo Ugalde MD     • cefepime  2,000 mg Intravenous Q12H Yolanda Garcia MD     • ipratropium-albuterol  3 mL Nebulization TID Rodriguezlucille Shultz, DO     • lactated ringers  100 mL/hr Intravenous Continuous Yolanda Garcia  mL/hr (03/02/23 0700)   • melatonin  6 mg Oral HS Manolo Ugalde MD     • nicotine  1 patch Transdermal Daily Riri Bruce MD     • vancomycin  1,250 mg Intravenous Q12H Manolo Ugalde MD 1,250 mg (03/02/23 0847)          ** Please Note: Dictation voice to text software may have been used in the creation of this document   **    Jo Ann Cueto MD  03/02/23  11:00 AM

## 2023-03-02 NOTE — PROGRESS NOTES
Joel Larsen is a 44 y o  female who is currently ordered Vancomycin IV with management by the Pharmacy Consult service  Relevant clinical data and objective / subjective history reviewed  Vancomycin Assessment:  Indication and Goal AUC/Trough: Soft tissue (goal -600, trough >10), -600, trough >10  Clinical Status: New  Micro:   Pending  Renal Function:  SCr: 0 82 mg/dL  CrCl: 116 5 mL/min  Days of Therapy: 1  Current Dose: 1250 mg IV once  Vancomycin Plan:  New Dosin mg IV q 12 h  Estimated AUC: 458 mcg*hr/mL  Estimated Trough: 14 3 mcg/mL  Next Level: 0600 on 2023  Renal Function Monitoring: Daily BMP and UOP  Pharmacy will continue to follow closely for s/sx of nephrotoxicity, infusion reactions and appropriateness of therapy  BMP and CBC will be ordered per protocol  We will continue to follow the patient’s culture results and clinical progress daily      Sara Roberts, Pharmacist

## 2023-03-02 NOTE — OCCUPATIONAL THERAPY NOTE
OT EVALUATION       03/02/23 1600   Note Type   Note type Screen   Additional Comments Per patient's RN, patient is completely independent and has no skilled OT needs     Licensure   [place] License Number  Erlinda Marquez Darrion 86 OTR/L [id number]

## 2023-03-03 PROBLEM — T36.8X5A: Status: ACTIVE | Noted: 2023-03-03

## 2023-03-03 PROBLEM — J02.9 SORE THROAT: Status: ACTIVE | Noted: 2023-03-03

## 2023-03-03 PROBLEM — L27.0: Status: ACTIVE | Noted: 2023-03-03

## 2023-03-03 LAB
ANION GAP SERPL CALCULATED.3IONS-SCNC: 3 MMOL/L (ref 4–13)
BASOPHILS # BLD AUTO: 0.03 THOUSANDS/ÂΜL (ref 0–0.1)
BASOPHILS NFR BLD AUTO: 0 % (ref 0–1)
BUN SERPL-MCNC: 9 MG/DL (ref 5–25)
CALCIUM SERPL-MCNC: 8.2 MG/DL (ref 8.4–10.2)
CHLORIDE SERPL-SCNC: 112 MMOL/L (ref 96–108)
CO2 SERPL-SCNC: 26 MMOL/L (ref 21–32)
CREAT SERPL-MCNC: 0.72 MG/DL (ref 0.6–1.3)
EOSINOPHIL # BLD AUTO: 0.19 THOUSAND/ÂΜL (ref 0–0.61)
EOSINOPHIL NFR BLD AUTO: 2 % (ref 0–6)
ERYTHROCYTE [DISTWIDTH] IN BLOOD BY AUTOMATED COUNT: 13.5 % (ref 11.6–15.1)
GFR SERPL CREATININE-BSD FRML MDRD: 106 ML/MIN/1.73SQ M
GLUCOSE SERPL-MCNC: 113 MG/DL (ref 65–140)
GLUCOSE SERPL-MCNC: 82 MG/DL (ref 65–140)
GLUCOSE SERPL-MCNC: 93 MG/DL (ref 65–140)
GLUCOSE SERPL-MCNC: 98 MG/DL (ref 65–140)
HCT VFR BLD AUTO: 36.6 % (ref 34.8–46.1)
HGB BLD-MCNC: 12 G/DL (ref 11.5–15.4)
IMM GRANULOCYTES # BLD AUTO: 0.04 THOUSAND/UL (ref 0–0.2)
IMM GRANULOCYTES NFR BLD AUTO: 0 % (ref 0–2)
LYMPHOCYTES # BLD AUTO: 2.06 THOUSANDS/ÂΜL (ref 0.6–4.47)
LYMPHOCYTES NFR BLD AUTO: 22 % (ref 14–44)
MCH RBC QN AUTO: 32.3 PG (ref 26.8–34.3)
MCHC RBC AUTO-ENTMCNC: 32.8 G/DL (ref 31.4–37.4)
MCV RBC AUTO: 98 FL (ref 82–98)
MONOCYTES # BLD AUTO: 0.74 THOUSAND/ÂΜL (ref 0.17–1.22)
MONOCYTES NFR BLD AUTO: 8 % (ref 4–12)
NEUTROPHILS # BLD AUTO: 6.53 THOUSANDS/ÂΜL (ref 1.85–7.62)
NEUTS SEG NFR BLD AUTO: 68 % (ref 43–75)
NRBC BLD AUTO-RTO: 0 /100 WBCS
PLATELET # BLD AUTO: 220 THOUSANDS/UL (ref 149–390)
PMV BLD AUTO: 10.2 FL (ref 8.9–12.7)
POTASSIUM SERPL-SCNC: 4 MMOL/L (ref 3.5–5.3)
RBC # BLD AUTO: 3.72 MILLION/UL (ref 3.81–5.12)
SODIUM SERPL-SCNC: 141 MMOL/L (ref 135–147)
VANCOMYCIN TROUGH SERPL-MCNC: 10.3 UG/ML (ref 10–20)
WBC # BLD AUTO: 9.59 THOUSAND/UL (ref 4.31–10.16)

## 2023-03-03 RX ORDER — POLYETHYLENE GLYCOL 3350 17 G/17G
17 POWDER, FOR SOLUTION ORAL ONCE
Status: COMPLETED | OUTPATIENT
Start: 2023-03-03 | End: 2023-03-03

## 2023-03-03 RX ORDER — HYDROXYZINE HYDROCHLORIDE 10 MG/1
10 TABLET, FILM COATED ORAL ONCE
Status: COMPLETED | OUTPATIENT
Start: 2023-03-03 | End: 2023-03-03

## 2023-03-03 RX ORDER — GUAIFENESIN 600 MG/1
600 TABLET, EXTENDED RELEASE ORAL EVERY 12 HOURS SCHEDULED
Status: DISCONTINUED | OUTPATIENT
Start: 2023-03-03 | End: 2023-03-04 | Stop reason: HOSPADM

## 2023-03-03 RX ADMIN — Medication 2000 MG: at 23:31

## 2023-03-03 RX ADMIN — VANCOMYCIN HYDROCHLORIDE 1500 MG: 10 INJECTION, POWDER, LYOPHILIZED, FOR SOLUTION INTRAVENOUS at 20:08

## 2023-03-03 RX ADMIN — VANCOMYCIN HYDROCHLORIDE 1250 MG: 10 INJECTION, POWDER, LYOPHILIZED, FOR SOLUTION INTRAVENOUS at 08:15

## 2023-03-03 RX ADMIN — NICOTINE 1 PATCH: 14 PATCH, EXTENDED RELEASE TRANSDERMAL at 14:51

## 2023-03-03 RX ADMIN — IPRATROPIUM BROMIDE AND ALBUTEROL SULFATE 3 ML: 2.5; .5 SOLUTION RESPIRATORY (INHALATION) at 09:43

## 2023-03-03 RX ADMIN — Medication 2000 MG: at 11:35

## 2023-03-03 RX ADMIN — ACETAMINOPHEN 650 MG: 325 TABLET, FILM COATED ORAL at 14:53

## 2023-03-03 RX ADMIN — Medication 2000 MG: at 00:36

## 2023-03-03 RX ADMIN — HYDROXYZINE HYDROCHLORIDE 10 MG: 10 TABLET ORAL at 11:43

## 2023-03-03 RX ADMIN — ENOXAPARIN SODIUM 40 MG: 40 INJECTION SUBCUTANEOUS at 08:11

## 2023-03-03 RX ADMIN — SODIUM CHLORIDE, SODIUM LACTATE, POTASSIUM CHLORIDE, AND CALCIUM CHLORIDE 100 ML/HR: .6; .31; .03; .02 INJECTION, SOLUTION INTRAVENOUS at 14:53

## 2023-03-03 RX ADMIN — GUAIFENESIN 600 MG: 600 TABLET, EXTENDED RELEASE ORAL at 11:35

## 2023-03-03 RX ADMIN — BUSPIRONE HYDROCHLORIDE 10 MG: 10 TABLET ORAL at 21:57

## 2023-03-03 RX ADMIN — IPRATROPIUM BROMIDE AND ALBUTEROL SULFATE 3 ML: 2.5; .5 SOLUTION RESPIRATORY (INHALATION) at 19:45

## 2023-03-03 RX ADMIN — BENZONATATE 100 MG: 100 CAPSULE ORAL at 20:15

## 2023-03-03 RX ADMIN — MELATONIN TAB 3 MG 9 MG: 3 TAB at 21:57

## 2023-03-03 RX ADMIN — IPRATROPIUM BROMIDE AND ALBUTEROL SULFATE 3 ML: 2.5; .5 SOLUTION RESPIRATORY (INHALATION) at 13:31

## 2023-03-03 RX ADMIN — GUAIFENESIN 600 MG: 600 TABLET, EXTENDED RELEASE ORAL at 23:24

## 2023-03-03 RX ADMIN — POLYETHYLENE GLYCOL 3350 17 G: 17 POWDER, FOR SOLUTION ORAL at 08:10

## 2023-03-03 NOTE — PROGRESS NOTES
Daily Progress Note - [address]  44 y o  female MRN: [id number]  Unit/Bed#: [address] Encounter: [id number]  Admitting Physician: Jose Coyne DO   PCP: Melissa Montgomery MD  Date of Admission:  3/1/2023 11:40 AM    Assessment and Plan:    * Left buttock abscess  Assessment & Plan  POA,  pain over the left buttock, leukocytosis noted on admission  Reports fevers at home (Tmax 103)  CT showed extensive cellulitis of the left buttock region and extending into the anal verge with droplets of gas  · Patient much improved since I&D, reports no pain  · Leukocytosis resolved  · Blood cx 1 out of 2 growing gram pos, possibly contaminant  · Wound cx growing 3+ gram negative, prelimanary  · Continue Cefepime and Vancomycin  · Continue to monitor wound status  · Tylenol 650 mg prn pain  · Pt without BM for 4 days, continue Miralax to avoid straining  · Surgery consulted    Sore throat  Assessment & Plan  Patient reports sore throat 1 day post intubation for I&D  Ddx includes trauma from intubation vs viral origin  · Throat lozenges prn  · Encourage continued fluids  · Continue to monitor    Possible vancomycin-induced erythroderma  Assessment & Plan  Acute, improving    Patient reports acute event overnight of severe arm redness, swelling, and pain after administration of an antibiotic at 8 PM   Patient initially thought this was due to cefepime, however on MAR review, patient received vancomycin at this time  Patient reports she was tearful, and eventually symptoms resolved after discontinuation of the Abx  Patient reports redness is improving this morning, some residual erythema still seen  Patient has been receiving Vancomycin for 2 days prior  Unclear if due reaction vs seepage from IV irritating the skin causing localized skin erythema/necrosis   Mild erythema noted this morning   - Will monitor closely with Vancomycin administration this morning      Chronic obstructive pulmonary disease [facility])  Assessment & Plan  Patient was given diagnosis of COPD during RT evaluation  Patient wheezing on exam despite breathing treatments  O2 sat 95% on RA overnight  - Will consider CXR this morning    - Continue Duonebs and Tessalon perles  - Follow up pulmonology on discharge    Sepsis [facility])  Assessment & Plan  POA, significant leukocytosis, tachycardia, and tachypnea  Self reported fever at home of 103F  Has been afebrile in the hospital  Patient received bolus IV fluids in the ED  WBC wnl this morning  · Leukocytosis resolved  · Blood cultures 1 out 2 growing gram pos, possibly contaminant  · Wound cx growing 3+ gram negative, preliminary results  · Monitor WBCs, fever curve  · Continue Cefepime 2 g and Vancomycin pending cultures  ·  cc/hr     Gastroesophageal reflux disease  Assessment & Plan  Patient not taking Pepcid 20 mg    Smoking  Assessment & Plan  Patient reports smoking half pack per day  · Nicotine patch in place    Anxiety  Assessment & Plan  Chronic    Patient reports her anxiety is worsening with this painful abscess  Unable to sleep d/t anxiety  Patient states she takes 40 mg melatonin HS at home which is a very high dose and this was discussed with the patient today  · Continue BuSpar 5 mg twice daily  · Melatonin 9mg HS    Depression  Assessment & Plan  Chronic    Patient reports she self discontinued Zoloft months ago, reports mood is improved  Patient reports she is taking BuSpar 5 mg twice daily  · Continue BuSpar 5 mg twice daily    Asthma  Assessment & Plan  Patient reports she does not use her albuterol inhaler  Reports some shortness of breath overnight consistent with baseline asthma  · DuoNeb treatment TID  · Tessalon perles prn cough   · Monitor O2 sats        VTE Pharmacologic Prophylaxis: VTE Score: 3 Moderate Risk (Score 3-4) - Pharmacological DVT Prophylaxis Ordered: enoxaparin (Lovenox)     Patient Centered Rounds: I have performed bedside rounds with nursing staff today  Discussions with Specialists or Other Care Team Provider: Surgery    Time Spent for Care: 20 minutes  More than 50% of total time spent on counseling and coordination of care as described above  Current Length of Stay: 2 day(s)  Current Patient Status: Inpatient   Certification Statement: The patient will continue to require additional inpatient hospital stay due to abscess and sepsis requiring IV abx  Code Status: Level 1 - Full Code    Subjective:   Patient seen at bedside and is in no acute distress  Patient reports acute event overnight of right upper extremity redness, pain, and swelling after receiving antibiotics through the IV at around 8 PM  Patient reports severe pain after administration of what she believed was cefepime  However, on MAR review, patient received vancomycin  Patient reports severe pain and diffuse erythema and swelling around the site of the IV, resulting in her crying  She states the nurse did not come back for a while before stopping administration  Patient reports that the redness is improving this morning  Complains of a sore throat since yesterday and mild cough  She states her neck is tender to touch as well as pain with swallowing  Has been taking throat lozenges which she said have helped  Denies fevers, chills, congestion, or shortness of breath  Wheezing has improved with DuoNeb treatments  Denies chest pain, palpitations, nausea  She reports no pain to her wound area and is taking no medication for the pain  She slept much better last night with 9 mg melatonin given  Tolerating PO without difficulty  Passing gas but no BM for 3-4 days however has baseline constipation  Urinating without difficulty, out of bed with nurse  Denies vomiting, diarrhea, dysuria, numbness, tingling, or leg pain         Objective     Objective:   Vitals:   Temp (24hrs), Av 1 °F (36 7 °C), Min:97 6 °F (36 4 °C), Max:98 6 °F (37 °C)    Temp:  [97 6 °F (36 4 °C)-98 6 °F (37 °C)] 98 4 °F (36 9 °C)  HR:  [64-78] 72  Resp:  [18] 18  BP: (101-129)/(49-63) 101/49  SpO2:  [95 %-99 %] 97 %  Body mass index is 36 81 kg/m²  Input and Output Summary (last 24 hours): Intake/Output Summary (Last 24 hours) at 3/3/2023 0717  Last data filed at 3/2/2023 2042  Gross per 24 hour   Intake 1695 ml   Output --   Net 1695 ml       Physical Exam:   Physical Exam  Constitutional:       General: She is not in acute distress  Appearance: She is not toxic-appearing  HENT:      Mouth/Throat:      Pharynx: No oropharyngeal exudate or posterior oropharyngeal erythema  Cardiovascular:      Rate and Rhythm: Normal rate and regular rhythm  Heart sounds: No murmur heard  No friction rub  No gallop  Pulmonary:      Effort: Pulmonary effort is normal  No respiratory distress  Breath sounds: Wheezing (minimal at bases b/l) present  No rhonchi or rales  Abdominal:      General: Bowel sounds are normal  There is no distension  Palpations: Abdomen is soft  Tenderness: There is no abdominal tenderness  There is no guarding  Musculoskeletal:         General: No swelling  Cervical back: Normal range of motion  Tenderness present  Lymphadenopathy:      Cervical: Cervical adenopathy (left sided) present  Skin:     General: Skin is warm and dry  Findings: No rash  Comments: Packing removed from perianal wound of left buttock  Small amount of serosanguinous drainage  Very minimally tender  Neurological:      Mental Status: She is alert     Psychiatric:         Mood and Affect: Mood normal          Behavior: Behavior normal            Additional Data:     Labs:  Results from last 7 days   Lab Units 03/03/23  0641   WBC Thousand/uL 9 59   HEMOGLOBIN g/dL 12 0   HEMATOCRIT % 36 6   PLATELETS Thousands/uL 220   NEUTROS PCT % 68   LYMPHS PCT % 22   MONOS PCT % 8   EOS PCT % 2     Results from last 7 days   Lab Units 03/02/23  0538   POTASSIUM mmol/L 3 7   CHLORIDE mmol/L 108   CO2 mmol/L 24   BUN mg/dL 11   CREATININE mg/dL 0 68   CALCIUM mg/dL 8 1*   ALK PHOS U/L 50   ALT U/L 7   AST U/L 13     Results from last 7 days   Lab Units 03/01/23  1218   INR  1 22*     Results from last 7 days   Lab Units 03/03/23  0708 03/02/23  1554 03/02/23  1113 03/02/23  0722   POC GLUCOSE mg/dl 82 95 118 114     Results from last 7 days   Lab Units 03/01/23  1218   HEMOGLOBIN A1C % 4 8       * I Have Reviewed All Lab Data Listed Above  * Additional Pertinent Lab Tests Reviewed:  All Labs Within Last 24 Hours Reviewed    Imaging:  Imaging Reports Reviewed Today Include: N/A    Recent Cultures (last 7 days):     Results from last 7 days   Lab Units 03/01/23  1747 03/01/23  1219 03/01/23  1218   BLOOD CULTURE   --  No Growth at 24 hrs   --    GRAM STAIN RESULT  1+ Polys*  3+ Gram negative rods*  2+ Gram positive rods*  1+ Gram positive cocci in pairs* 1+ Polys*  4+ Gram negative rods*  3+ Gram positive cocci in pairs* Gram positive cocci in clusters*   WOUND CULTURE   --  Culture too young- will reincubate  --        Last 24 Hours Medication List:   Current Facility-Administered Medications   Medication Dose Route Frequency Provider Last Rate   • acetaminophen  650 mg Oral Q6H PRN Annemarie Beck MD     • benzonatate  100 mg Oral TID PRN Kahlil Tamez MD     • busPIRone  10 mg Oral HS Diana Almaraz MD     • cefepime  2,000 mg Intravenous Q12H Annemarie Beck MD 2,000 mg (03/03/23 0036)   • enoxaparin  40 mg Subcutaneous Q24H [address] Gearbrandy Aden MD     • ipratropium-albuterol  3 mL Nebulization TID Karla Luong DO     • lactated ringers  100 mL/hr Intravenous Continuous Annemarie Beck  mL/hr (03/02/23 2042)   • melatonin  9 mg Oral HS Kahlil Tamez MD     • nicotine  1 patch Transdermal Daily Kahlil Tamez MD     • vancomycin  1,250 mg Intravenous Q12H Diana Almaraz MD 1,250 mg (03/02/23 2044)          ** Please Note: Dictation voice to text software may have been used in the creation of this document   **    Raya Coe  03/03/23  7:17 AM

## 2023-03-03 NOTE — ASSESSMENT & PLAN NOTE
Acute, improving    Patient reports acute event overnight of severe arm redness, swelling, and pain after administration of an antibiotic at 8 PM   Patient initially thought this was due to cefepime, however on MAR review, patient received vancomycin at this time  Patient reports she was tearful, and eventually symptoms resolved after discontinuation of the Abx  Patient reports redness is improving this morning, some residual erythema still seen  Patient has been receiving Vancomycin for 2 days prior  Unclear if due reaction vs seepage from IV irritating the skin causing localized skin erythema/necrosis   Mild erythema noted this morning   - Will monitor closely with Vancomycin administration this morning

## 2023-03-03 NOTE — ASSESSMENT & PLAN NOTE
Patient reports sore throat 1 day post intubation for I&D  Ddx includes trauma from intubation vs viral origin     · Throat lozenges prn  · Encourage continued fluids  · Continue to monitor

## 2023-03-03 NOTE — PLAN OF CARE
Problem: SKIN/TISSUE INTEGRITY - ADULT  Goal: Skin Integrity remains intact(Skin Breakdown Prevention)  Description: Assess:  -Perform Juan J assessment every  -Clean and moisturize skin every   -Inspect skin when repositioning, toileting, and assisting with ADLS  -Assess under medical devices such as  every   -Assess extremities for adequate circulation and sensation     Bed Management:  -Have minimal linens on bed & keep smooth, unwrinkled  -Change linens as needed when moist or perspiring  -Avoid sitting or lying in one position for more than  hours while in bed  -Keep HOB at degrees     Toileting:  -Offer bedside commode  -Assess for incontinence every   -Use incontinent care products after each incontinent episode such as     Activity:  -Mobilize patient  times a day  -Encourage activity and walks on unit  -Encourage or provide ROM exercises   -Turn and reposition patient every  Hours  -Use appropriate equipment to lift or move patient in bed  -Instruct/ Assist with weight shifting every  when out of bed in chair  -Consider limitation of chair time  hour intervals    Skin Care:  -Avoid use of baby powder, tape, friction and shearing, hot water or constrictive clothing  -Relieve pressure over bony prominences using   -Do not massage red bony areas    Next Steps:  -Teach patient strategies to minimize risks such as    -Consider consults to  interdisciplinary teams such as   Outcome: Progressing  Goal: Incision(s), wounds(s) or drain site(s) healing without S/S of infection  Description: INTERVENTIONS  - Assess and document dressing, incision, wound bed, drain sites and surrounding tissue  - Provide patient and family education  - Perform skin care/dressing changes every   Outcome: Progressing  Goal: Pressure injury heals and does not worsen  Description: Interventions:  - Implement low air loss mattress or specialty surface (Criteria met)  - Apply silicone foam dressing  - Instruct/assist with weight shifting every  minutes when in chair   - Limit chair time to  hour intervals  - Use special pressure reducing interventions such as  when in chair   - Apply fecal or urinary incontinence containment device   - Perform passive or active ROM every   - Turn and reposition patient & offload bony prominences every  hours   - Utilize friction reducing device or surface for transfers   - Consider consults to  interdisciplinary teams such as   - Use incontinent care products after each incontinent episode such as   - Consider nutrition services referral as needed  Outcome: Progressing     Problem: PAIN - ADULT  Goal: Verbalizes/displays adequate comfort level or baseline comfort level  Description: Interventions:  - Encourage patient to monitor pain and request assistance  - Assess pain using appropriate pain scale  - Administer analgesics based on type and severity of pain and evaluate response  - Implement non-pharmacological measures as appropriate and evaluate response  - Consider cultural and social influences on pain and pain management  - Notify physician/advanced practitioner if interventions unsuccessful or patient reports new pain  Outcome: Progressing     Problem: INFECTION - ADULT  Goal: Absence or prevention of progression during hospitalization  Description: INTERVENTIONS:  - Assess and monitor for signs and symptoms of infection  - Monitor lab/diagnostic results  - Monitor all insertion sites, i e  indwelling lines, tubes, and drains  - Monitor endotracheal if appropriate and nasal secretions for changes in amount and color  - Burk[place] appropriate cooling/warming therapies per order  - Administer medications as ordered  - Instruct and encourage patient and family to use good hand hygiene technique  - Identify and instruct in appropriate isolation precautions for identified infection/condition  Outcome: Progressing  Goal: Absence of fever/infection during neutropenic period  Description: INTERVENTIONS:  - Monitor WBC    Outcome: Progressing     Problem: RESPIRATORY - ADULT  Goal: Achieves optimal ventilation and oxygenation  Description: INTERVENTIONS:  - Assess for changes in respiratory status  - Assess for changes in mentation and behavior  - Position to facilitate oxygenation and minimize respiratory effort  - Oxygen administered by appropriate delivery if ordered  - Initiate smoking cessation education as indicated  - Encourage broncho-pulmonary hygiene including cough, deep breathe, Incentive Spirometry  - Assess the need for suctioning and aspirate as needed  - Assess and instruct to report SOB or any respiratory difficulty  - Respiratory Therapy support as indicated  Outcome: Progressing

## 2023-03-03 NOTE — PLAN OF CARE
Problem: SKIN/TISSUE INTEGRITY - ADULT  Goal: Skin Integrity remains intact(Skin Breakdown Prevention)  Description: Assess:  -Perform Juan J assessment every shift  -Clean and moisturize skin every shift  -Inspect skin when repositioning, toileting, and assisting with ADLS  -Assess under medical devices such as iv every shift  -Assess extremities for adequate circulation and sensation     Bed Management:  -Have minimal linens on bed & keep smooth, unwrinkled  -Change linens as needed when moist or perspiring  -Avoid sitting or lying in one position for more than 2 hours while in bed  -Keep HOB at 30 degrees     Toileting:  -Offer bedside commode  -Assess for incontinence every 2 hours  -Use incontinent care products after each incontinent episode such as soap and water    Activity:  -Mobilize patient 3 times a day  -Encourage activity and walks on unit  -Encourage or provide ROM exercises   -Turn and reposition patient every 2 Hours  -Use appropriate equipment to lift or move patient in bed  -Instruct/ Assist with weight shifting every 2 hours when out of bed in chair  -Consider limitation of chair time 2 hour intervals    Skin Care:  -Avoid use of baby powder, tape, friction and shearing, hot water or constrictive clothing  -Relieve pressure over bony prominences using allevyn  -Do not massage red bony areas    Next Steps:  -Teach patient strategies to minimize risks such as fall risk   -Consider consults to  interdisciplinary teams such as PT/OT  Outcome: Progressing  Goal: Incision(s), wounds(s) or drain site(s) healing without S/S of infection  Description: INTERVENTIONS  - Assess and document dressing, incision, wound bed, drain sites and surrounding tissue  - Provide patient and family education  - Perform skin care/dressing changes every shift  Outcome: Progressing  Goal: Pressure injury heals and does not worsen  Description: Interventions:  - Implement low air loss mattress or specialty surface (Criteria met)  - Apply silicone foam dressing  - Instruct/assist with weight shifting every 120 minutes when in chair   - Limit chair time to 2 hour intervals  - Use special pressure reducing interventions such as waffle cushion when in chair   - Apply fecal or urinary incontinence containment device   - Perform passive or active ROM every 6 hours  - Turn and reposition patient & offload bony prominences every 2 hours   - Utilize friction reducing device or surface for transfers   - Consider consults to  interdisciplinary teams such as PT/OT  - Use incontinent care products after each incontinent episode such as soap and water  - Consider nutrition services referral as needed  Outcome: Progressing     Problem: PAIN - ADULT  Goal: Verbalizes/displays adequate comfort level or baseline comfort level  Description: Interventions:  - Encourage patient to monitor pain and request assistance  - Assess pain using appropriate pain scale  - Administer analgesics based on type and severity of pain and evaluate response  - Implement non-pharmacological measures as appropriate and evaluate response  - Consider cultural and social influences on pain and pain management  - Notify physician/advanced practitioner if interventions unsuccessful or patient reports new pain  Outcome: Progressing     Problem: INFECTION - ADULT  Goal: Absence or prevention of progression during hospitalization  Description: INTERVENTIONS:  - Assess and monitor for signs and symptoms of infection  - Monitor lab/diagnostic results  - Monitor all insertion sites, i e  indwelling lines, tubes, and drains  - Monitor endotracheal if appropriate and nasal secretions for changes in amount and color  - [place] appropriate cooling/warming therapies per order  - Administer medications as ordered  - Instruct and encourage patient and family to use good hand hygiene technique  - Identify and instruct in appropriate isolation precautions for identified infection/condition  Outcome: Progressing  Goal: Absence of fever/infection during neutropenic period  Description: INTERVENTIONS:  - Monitor WBC    Outcome: Progressing     Problem: RESPIRATORY - ADULT  Goal: Achieves optimal ventilation and oxygenation  Description: INTERVENTIONS:  - Assess for changes in respiratory status  - Assess for changes in mentation and behavior  - Position to facilitate oxygenation and minimize respiratory effort  - Oxygen administered by appropriate delivery if ordered  - Initiate smoking cessation education as indicated  - Encourage broncho-pulmonary hygiene including cough, deep breathe, Incentive Spirometry  - Assess the need for suctioning and aspirate as needed  - Assess and instruct to report SOB or any respiratory difficulty  - Respiratory Therapy support as indicated  Outcome: Progressing

## 2023-03-03 NOTE — DISCHARGE SUMMARY
Discharge Summary - [facility]   Patient Information: Sergo Dahl 44 y o  female   MRN: [id number]  Unit/Bed#: [address]    Encounter: [id number]       Admitting Physician: Dale Maxwell DO  Discharging Physician/Practitioner: No att  providers found   PCP: Breanna Kaur MD    Admission Date: 03/01/23  Discharge Date: 03/06/23      Reason for Admission:    Cellulitis and abscess of buttock [L02 31, L03 317]  Abscess [L02 91]  Left buttock abscess [L02 31]     Discharge Diagnoses:      Principal Problem:    Left buttock abscess  Active Problems:    Possible vancomycin-induced erythroderma    Asthma    Chronic obstructive pulmonary disease ([facility] [address] )    Anxiety and depression    Anxiety    Smoking    Gastroesophageal reflux disease    Sore throat  Resolved Problems:    [facility]    Consultations During Hospital Stay:    · Surgery     Procedures Performed:    · Left Buttock abscess I&D     Significant Findings / Test Results:     3/4: K 3 5, WBC 9 64  3/3: WBC 9 59  3/2: WBC 19 52  3/1: WBC 24 95, LA 0 9    Wound Cx: 1 + polys abn, 4 + GNR abn, 3 + GPC in pairs  Tissue Cx: 2+ Growth of - Actinomyces turicensis, 2+ GP , 3+ GNR, 1+ GPC in pairs   Anaerobic Cx: Culture results to follow  Blood Cx: Staph coag neg, GP in clusters - contaminant, BCx 2 NG 78H     3/1 CT abdomen/pelvis: Extensive cellulitis of the left buttock region extending from the anal verge with droplets suggesting gas-forming organisms  Small collection measuring 2 cm may be evident  No evidence of bowel dilatation or inflammatory change  Multiple gallstones  3/1 CXR: No acute cardiopulmonary disease  Incidental Findings: None     Test Results Pending at Discharge (will require follow up): · Anaerobic culture     Outpatient Tests Requested:    · CBC   · CMP     Outpatient follow-up Requested:    · [facility]  · Surgery     Complications:       Things to address at first visit after hospitalization    · Is patient's pain well controlled? · Did she finish her antibiotic course? · Has she has any constipation? · Has she followed up with pulmonology upon discharge? - Provide referral     Hospital Course:     Suresh Tillman is a 44 y o  female patient who originally presented to the clinic on 3/1/2023 due to 3 days of worsening pain over the left buttock with associated fevers, Tmax 103 at home  PCP then referred patient to ER for further evaluation  Patient subsequently admitted for sepsis and left buttock abscess requiring I&D  * Left buttock abscess  Assessment & Plan    POA,  pain over the left buttock, leukocytosis noted on admission  Reports fevers at home (Tmax 103)  CT showed extensive cellulitis of the left buttock region and extending into the anal verge with droplets of gas  Patient much improved since I&D per surgery, reported no pain  Leukocytosis resolved (WBC 9 64 today)  Blood cx 1 out of 2 growing gram pos, possibly contaminant  Wound cx growing 3+ gram negative, preliminary  Treated with Cefepime and Vancomycin  · Complete 8 day course of Doxycycline and Augmentin  · CMP and CBC in one week   · Follow up with surgery for wound check     Sepsis (HCC)-resolved as of 3/4/2023  Assessment & Plan    Resolved    Possible vancomycin-induced erythroderma  Assessment & Plan    Acute, improving    Patient reported acute event overnight of severe arm redness, swelling, and pain after administration of an antibiotic  Patient received vancomycin at that time  Patient reported improvement after antibiotic discontinuation  Gastroesophageal reflux disease  Assessment & Plan    Pt complaining if epigastric pain and not taking Pepcid 20 mg  Lipase 7      · Consider Pepcid  · Continue management for constipation     Smoking  Assessment & Plan    Patient reported smoking half pack per day       · Nicotine patch in place  · Smoking cessation education     Anxiety and depression  Assessment & Plan    Chronic    Anxiety controlled with Buspar during IP stay  · Continue BuSpar 10 mg, Po daily, HS at home    Chronic obstructive pulmonary disease [facility])  Assessment & Plan    Patient was given diagnosis of COPD during RT evaluation  Treated with Duonebs, mucinex and Tessalon perles  · Follow up pulmonology on discharge    Asthma  Assessment & Plan    Patient reported she does not use her albuterol inhaler  Reports some shortness of breath overnight consistent with baseline asthma  Treated with DuoNebs, Tessalon perles and Mucinex  Condition at Discharge: Stable      Discharge Day Visit / Exam:      Vitals: Blood Pressure: 144/68 (03/04/23 0725)  Pulse: 65 (03/04/23 0725)  Temperature: 98 °F (36 7 °C) (03/04/23 0725)  Temp Source: Oral (03/04/23 0725)  Respirations: 18 (03/04/23 0725)  Height: 5' 7" (170 2 cm) (03/01/23 1518)  Weight - Scale: 107 kg (235 lb) (03/01/23 1518)  SpO2: 97 % (03/04/23 1342)    Exam:     Physical Exam  Constitutional:       General: She is not in acute distress  Appearance: Normal appearance  She is obese  She is not ill-appearing or toxic-appearing  HENT:      Head: Normocephalic and atraumatic  Right Ear: External ear normal       Left Ear: External ear normal       Nose: Nose normal       Mouth/Throat:      Mouth: Mucous membranes are moist    Eyes:      General: No scleral icterus  Conjunctiva/sclera: Conjunctivae normal    Cardiovascular:      Rate and Rhythm: Normal rate and regular rhythm  Pulses: Normal pulses  Heart sounds: Normal heart sounds  Pulmonary:      Effort: Pulmonary effort is normal  No respiratory distress  Breath sounds: Normal breath sounds  No wheezing  Abdominal:      General: Bowel sounds are normal  There is no distension  Palpations: Abdomen is soft  Tenderness: There is no abdominal tenderness        Comments: Slight epigastric tenderness   Musculoskeletal:         General: Normal range of motion  Cervical back: Normal range of motion  Right lower leg: No edema  Left lower leg: No edema  Skin:     General: Skin is warm and dry  Findings: No lesion or rash  Neurological:      Mental Status: She is alert and oriented to person, place, and time  Motor: No weakness  Psychiatric:         Mood and Affect: Mood normal          Behavior: Behavior normal          Thought Content: Thought content normal          Judgment: Judgment normal      Discharge instructions/Information to patient and family:    See after visit summary for information provided to patient and family  Discharge Medications:     Medication List      START taking these medications    • amoxicillin-clavulanate 875-125 mg per tablet; Commonly known as:   AUGMENTIN; Take 1 tablet by mouth every 12 (twelve) hours for 15 doses  • doxycycline hyclate 100 mg capsule; Commonly known as: VIBRAMYCIN; Take   1 capsule (100 mg total) by mouth every 12 (twelve) hours for 15 doses  • polyethylene glycol 17 g packet; Commonly known as: Edgar Gaines; Take 17 g   by mouth daily as needed (Constipation)     CONTINUE taking these medications    • busPIRone 5 mg tablet; Commonly known as: BUSPAR; TAKE 1 TABLET BY MOUTH   TWICE A DAY     STOP taking these medications    • acetaminophen-codeine 300-30 MG per tablet; Commonly known as: TYLENOL   with CODEINE #3  • albuterol (2 5 mg/3 mL) 0 083 % nebulizer solution  • famotidine 20 mg tablet; Commonly known as: PEPCID  • ibuprofen 600 mg tablet; Commonly known as: MOTRIN  • ondansetron 4 mg tablet; Commonly known as: ZOFRAN  • sertraline 100 mg tablet; Commonly known as: ZOLOFT     Disposition:     Home     Discharge Statement:    I spent 45 minutes discharging the patient  This time was spent on the day of discharge  I had direct contact with the patient on the day of discharge   Greater than 50% of the total time was spent examining patient, answering all patient questions, arranging and discussing plan of care with patient as well as directly providing post-discharge instructions  Additional time then spent on discharge activities       ** Please Note: This note has been constructed using a voice recognition system **     Melchor Richter MD

## 2023-03-03 NOTE — PROGRESS NOTES
Lashaun Simons is a 44 y o  female who is currently ordered Vancomycin IV with management by the Pharmacy Consult service  Relevant clinical data and objective / subjective history reviewed  Vancomycin Assessment:  Indication and Goal AUC/Trough: Soft tissue (goal -600, trough >10), -600, trough >10  Clinical Status: stable  Micro:     Renal Function:  SCr: 0 72 mg/dL  CrCl: 132 7 mL/min  Renal replacement: Not on dialysis  Days of Therapy: 3  Current Dose: 1250 mg IV q12h  Vancomycin Plan:  New Dosin mg IV q12h  Estimated AUC: 467 mcg*hr/mL  Estimated Trough: 13 4 mcg/mL  Next Level: 3/10/23 at 0600  Renal Function Monitoring: Daily BMP and [place] will continue to follow closely for s/sx of nephrotoxicity, infusion reactions and appropriateness of therapy  BMP and CBC will be ordered per protocol  We will continue to follow the patient’s culture results and clinical progress daily      Katarzyna Sparks, Pharmacist

## 2023-03-04 VITALS
DIASTOLIC BLOOD PRESSURE: 68 MMHG | OXYGEN SATURATION: 97 % | HEIGHT: 67 IN | SYSTOLIC BLOOD PRESSURE: 144 MMHG | TEMPERATURE: 98 F | BODY MASS INDEX: 36.88 KG/M2 | HEART RATE: 65 BPM | RESPIRATION RATE: 18 BRPM | WEIGHT: 235 LBS

## 2023-03-04 PROBLEM — A41.9 SEPSIS (HCC): Status: RESOLVED | Noted: 2023-03-01 | Resolved: 2023-03-04

## 2023-03-04 LAB
ALBUMIN SERPL BCP-MCNC: 3.2 G/DL (ref 3.5–5)
ALP SERPL-CCNC: 46 U/L (ref 34–104)
ALT SERPL W P-5'-P-CCNC: 13 U/L (ref 7–52)
ANION GAP SERPL CALCULATED.3IONS-SCNC: 7 MMOL/L (ref 4–13)
AST SERPL W P-5'-P-CCNC: 15 U/L (ref 13–39)
ATRIAL RATE: 91 BPM
BACTERIA BLD CULT: ABNORMAL
BACTERIA TISS AEROBE CULT: ABNORMAL
BACTERIA WND AEROBE CULT: ABNORMAL
BASOPHILS # BLD AUTO: 0.04 THOUSANDS/ÂΜL (ref 0–0.1)
BASOPHILS NFR BLD AUTO: 0 % (ref 0–1)
BILIRUB SERPL-MCNC: 0.29 MG/DL (ref 0.2–1)
BUN SERPL-MCNC: 9 MG/DL (ref 5–25)
CALCIUM ALBUM COR SERPL-MCNC: 8.9 MG/DL (ref 8.3–10.1)
CALCIUM SERPL-MCNC: 8.3 MG/DL (ref 8.4–10.2)
CHLORIDE SERPL-SCNC: 110 MMOL/L (ref 96–108)
CO2 SERPL-SCNC: 24 MMOL/L (ref 21–32)
CREAT SERPL-MCNC: 0.68 MG/DL (ref 0.6–1.3)
EOSINOPHIL # BLD AUTO: 0.23 THOUSAND/ÂΜL (ref 0–0.61)
EOSINOPHIL NFR BLD AUTO: 2 % (ref 0–6)
ERYTHROCYTE [DISTWIDTH] IN BLOOD BY AUTOMATED COUNT: 13.5 % (ref 11.6–15.1)
GFR SERPL CREATININE-BSD FRML MDRD: 111 ML/MIN/1.73SQ M
GLUCOSE SERPL-MCNC: 136 MG/DL (ref 65–140)
GLUCOSE SERPL-MCNC: 88 MG/DL (ref 65–140)
GLUCOSE SERPL-MCNC: 98 MG/DL (ref 65–140)
GRAM STN SPEC: ABNORMAL
HCT VFR BLD AUTO: 34.4 % (ref 34.8–46.1)
HGB BLD-MCNC: 11.8 G/DL (ref 11.5–15.4)
IMM GRANULOCYTES # BLD AUTO: 0.04 THOUSAND/UL (ref 0–0.2)
IMM GRANULOCYTES NFR BLD AUTO: 0 % (ref 0–2)
LIPASE SERPL-CCNC: 7 U/L (ref 11–82)
LYMPHOCYTES # BLD AUTO: 1.55 THOUSANDS/ÂΜL (ref 0.6–4.47)
LYMPHOCYTES NFR BLD AUTO: 16 % (ref 14–44)
MCH RBC QN AUTO: 33.1 PG (ref 26.8–34.3)
MCHC RBC AUTO-ENTMCNC: 34.3 G/DL (ref 31.4–37.4)
MCV RBC AUTO: 96 FL (ref 82–98)
MONOCYTES # BLD AUTO: 1.06 THOUSAND/ÂΜL (ref 0.17–1.22)
MONOCYTES NFR BLD AUTO: 11 % (ref 4–12)
NEUTROPHILS # BLD AUTO: 6.72 THOUSANDS/ÂΜL (ref 1.85–7.62)
NEUTS SEG NFR BLD AUTO: 71 % (ref 43–75)
NRBC BLD AUTO-RTO: 0 /100 WBCS
P AXIS: 85 DEGREES
PLATELET # BLD AUTO: 230 THOUSANDS/UL (ref 149–390)
PMV BLD AUTO: 10.3 FL (ref 8.9–12.7)
POTASSIUM SERPL-SCNC: 3.5 MMOL/L (ref 3.5–5.3)
PR INTERVAL: 128 MS
PROT SERPL-MCNC: 6.1 G/DL (ref 6.4–8.4)
QRS AXIS: 85 DEGREES
QRSD INTERVAL: 98 MS
QT INTERVAL: 356 MS
QTC INTERVAL: 437 MS
RBC # BLD AUTO: 3.57 MILLION/UL (ref 3.81–5.12)
S AUREUS+CONS DNA BLD POS NAA+NON-PROBE: DETECTED
SODIUM SERPL-SCNC: 141 MMOL/L (ref 135–147)
T WAVE AXIS: 58 DEGREES
VENTRICULAR RATE: 91 BPM
WBC # BLD AUTO: 9.64 THOUSAND/UL (ref 4.31–10.16)

## 2023-03-04 RX ORDER — POLYETHYLENE GLYCOL 3350 17 G/17G
17 POWDER, FOR SOLUTION ORAL DAILY PRN
Qty: 10 EACH | Refills: 0 | Status: SHIPPED | OUTPATIENT
Start: 2023-03-04

## 2023-03-04 RX ORDER — DOXYCYCLINE HYCLATE 100 MG/1
100 CAPSULE ORAL EVERY 12 HOURS SCHEDULED
Qty: 15 CAPSULE | Refills: 0 | Status: SHIPPED | OUTPATIENT
Start: 2023-03-04 | End: 2023-03-11

## 2023-03-04 RX ORDER — AMOXICILLIN AND CLAVULANATE POTASSIUM 875; 125 MG/1; MG/1
1 TABLET, FILM COATED ORAL EVERY 12 HOURS SCHEDULED
Qty: 15 TABLET | Refills: 0 | Status: SHIPPED | OUTPATIENT
Start: 2023-03-04 | End: 2023-03-11

## 2023-03-04 RX ORDER — AMOXICILLIN AND CLAVULANATE POTASSIUM 875; 125 MG/1; MG/1
1 TABLET, FILM COATED ORAL EVERY 12 HOURS SCHEDULED
Status: DISCONTINUED | OUTPATIENT
Start: 2023-03-04 | End: 2023-03-04 | Stop reason: HOSPADM

## 2023-03-04 RX ORDER — SIMETHICONE 80 MG
80 TABLET,CHEWABLE ORAL EVERY 6 HOURS PRN
Status: DISCONTINUED | OUTPATIENT
Start: 2023-03-04 | End: 2023-03-04 | Stop reason: HOSPADM

## 2023-03-04 RX ORDER — DOCUSATE SODIUM 100 MG/1
100 CAPSULE, LIQUID FILLED ORAL 2 TIMES DAILY
Status: DISCONTINUED | OUTPATIENT
Start: 2023-03-04 | End: 2023-03-04

## 2023-03-04 RX ORDER — DOXYCYCLINE HYCLATE 100 MG/1
100 CAPSULE ORAL EVERY 12 HOURS SCHEDULED
Status: DISCONTINUED | OUTPATIENT
Start: 2023-03-04 | End: 2023-03-04 | Stop reason: HOSPADM

## 2023-03-04 RX ORDER — AMOXICILLIN 250 MG
1 CAPSULE ORAL
Status: DISCONTINUED | OUTPATIENT
Start: 2023-03-04 | End: 2023-03-04 | Stop reason: HOSPADM

## 2023-03-04 RX ORDER — POLYETHYLENE GLYCOL 3350 17 G/17G
17 POWDER, FOR SOLUTION ORAL DAILY
Status: DISCONTINUED | OUTPATIENT
Start: 2023-03-04 | End: 2023-03-04 | Stop reason: HOSPADM

## 2023-03-04 RX ORDER — POTASSIUM CHLORIDE 20 MEQ/1
40 TABLET, EXTENDED RELEASE ORAL ONCE
Status: COMPLETED | OUTPATIENT
Start: 2023-03-04 | End: 2023-03-04

## 2023-03-04 RX ADMIN — POTASSIUM CHLORIDE 40 MEQ: 1500 TABLET, EXTENDED RELEASE ORAL at 08:09

## 2023-03-04 RX ADMIN — VANCOMYCIN HYDROCHLORIDE 1500 MG: 10 INJECTION, POWDER, LYOPHILIZED, FOR SOLUTION INTRAVENOUS at 08:09

## 2023-03-04 RX ADMIN — IPRATROPIUM BROMIDE AND ALBUTEROL SULFATE 3 ML: 2.5; .5 SOLUTION RESPIRATORY (INHALATION) at 07:57

## 2023-03-04 RX ADMIN — DOCUSATE SODIUM 100 MG: 100 CAPSULE, LIQUID FILLED ORAL at 04:50

## 2023-03-04 RX ADMIN — AMOXICILLIN AND CLAVULANATE POTASSIUM 1 TABLET: 875; 125 TABLET ORAL at 13:09

## 2023-03-04 RX ADMIN — ENOXAPARIN SODIUM 40 MG: 40 INJECTION SUBCUTANEOUS at 08:09

## 2023-03-04 RX ADMIN — DOXYCYCLINE 100 MG: 100 CAPSULE ORAL at 13:09

## 2023-03-04 RX ADMIN — SIMETHICONE 80 MG: 80 TABLET, CHEWABLE ORAL at 04:50

## 2023-03-04 RX ADMIN — POLYETHYLENE GLYCOL 3350 17 G: 17 POWDER, FOR SOLUTION ORAL at 08:09

## 2023-03-04 RX ADMIN — IPRATROPIUM BROMIDE AND ALBUTEROL SULFATE 3 ML: 2.5; .5 SOLUTION RESPIRATORY (INHALATION) at 13:42

## 2023-03-04 NOTE — PLAN OF CARE
Problem: SKIN/TISSUE INTEGRITY - ADULT  Goal: Incision(s), wounds(s) or drain site(s) healing without S/S of infection  Description: INTERVENTIONS  - Assess and document dressing, incision, wound bed, drain sites and surrounding tissue  - Provide patient and family education  - Perform skin care/dressing changes daily and as needed  Outcome: Progressing       Outcome: Progressing     Problem: PAIN - ADULT  Goal: Verbalizes/displays adequate comfort level or baseline comfort level  Description: Interventions:  - Encourage patient to monitor pain and request assistance  - Assess pain using appropriate pain scale  - Administer analgesics based on type and severity of pain and evaluate response  - Implement non-pharmacological measures as appropriate and evaluate response  - Consider cultural and social influences on pain and pain management  - Notify physician/advanced practitioner if interventions unsuccessful or patient reports new pain  Outcome: Progressing

## 2023-03-04 NOTE — QUICK NOTE
Nurse notified that Pt is having a 10/10 abdominal pain and is very gassy  I went to check on her and did abdominal exam  Pt pointed at epigastric region for pain  Pt's abdomen was soft to touch and mild tenderness around midline epigastric region, no guarding  Pt had negative dudley sign, and Pt was burping and passing gas  Pt has not been able to have good BM since admission, 3 days ago  Pt was given Miralax previous morning but had very small BM  Pt was given colace and simethicone for now for constipation and gassiness  Pt is also continued on Miralax daily in the morning and will reassess in the morning  Will add lipase to morning lab along with CMP to R/O pancreatitis  If Pt continue to have abdominal pain will order abdominal Xray and possibly enema

## 2023-03-04 NOTE — PLAN OF CARE
Problem: SKIN/TISSUE INTEGRITY - ADULT  Goal: Skin Integrity remains intact(Skin Breakdown Prevention)  Description: Assess:  -Perform Juan J assessment every shift  -Clean and moisturize skin every shift  -Inspect skin when repositioning, toileting, and assisting with ADLS  -Assess under medical devices such as iv every shift  -Assess extremities for adequate circulation and sensation     Bed Management:  -Have minimal linens on bed & keep smooth, unwrinkled  -Change linens as needed when moist or perspiring  -Avoid sitting or lying in one position for more than 2 hours while in bed  -Keep HOB at 30 degrees     Toileting:  -Offer bedside commode  -Assess for incontinence every shift  -Use incontinent care products after each incontinent episode such as soap and water    Activity:  -Mobilize patient 3 times a day  -Encourage activity and walks on unit  -Encourage or provide ROM exercises   -Turn and reposition patient every 2 Hours  -Use appropriate equipment to lift or move patient in bed  -Instruct/ Assist with weight shifting every 2 hours when out of bed in chair  -Consider limitation of chair time 2 hour intervals    Skin Care:  -Avoid use of baby powder, tape, friction and shearing, hot water or constrictive clothing  -Relieve pressure over bony prominences using allevyn  -Do not massage red bony areas    Next Steps:  -Teach patient strategies to minimize risks such as fall risk   -Consider consults to  interdisciplinary teams such as PT/OT  Outcome: Progressing  Goal: Incision(s), wounds(s) or drain site(s) healing without S/S of infection  Description: INTERVENTIONS  - Assess and document dressing, incision, wound bed, drain sites and surrounding tissue  - Provide patient and family education  - Perform skin care/dressing changes every day  Outcome: Progressing  Goal: Pressure injury heals and does not worsen  Description: Interventions:  - Implement low air loss mattress or specialty surface (Criteria met)  - Apply silicone foam dressing  - Instruct/assist with weight shifting every 120 minutes when in chair   - Limit chair time to 2 hour intervals  - Use special pressure reducing interventions such as waffel cushion when in chair   - Apply fecal or urinary incontinence containment device   - Perform passive or active ROM every shift  - Turn and reposition patient & offload bony prominences every 2 hours   - Utilize friction reducing device or surface for transfers   - Consider consults to  interdisciplinary teams such as PT/OT  - Use incontinent care products after each incontinent episode such as soap and water  - Consider nutrition services referral as needed  Outcome: Progressing     Problem: PAIN - ADULT  Goal: Verbalizes/displays adequate comfort level or baseline comfort level  Description: Interventions:  - Encourage patient to monitor pain and request assistance  - Assess pain using appropriate pain scale  - Administer analgesics based on type and severity of pain and evaluate response  - Implement non-pharmacological measures as appropriate and evaluate response  - Consider cultural and social influences on pain and pain management  - Notify physician/advanced practitioner if interventions unsuccessful or patient reports new pain  Outcome: Progressing     Problem: INFECTION - ADULT  Goal: Absence or prevention of progression during hospitalization  Description: INTERVENTIONS:  - Assess and monitor for signs and symptoms of infection  - Monitor lab/diagnostic results  - Monitor all insertion sites, i e  indwelling lines, tubes, and drains  - Monitor endotracheal if appropriate and nasal secretions for changes in amount and color  - [place] appropriate cooling/warming therapies per order  - Administer medications as ordered  - Instruct and encourage patient and family to use good hand hygiene technique  - Identify and instruct in appropriate isolation precautions for identified infection/condition  Outcome: Progressing  Goal: Absence of fever/infection during neutropenic period  Description: INTERVENTIONS:  - Monitor WBC    Outcome: Progressing     Problem: RESPIRATORY - ADULT  Goal: Achieves optimal ventilation and oxygenation  Description: INTERVENTIONS:  - Assess for changes in respiratory status  - Assess for changes in mentation and behavior  - Position to facilitate oxygenation and minimize respiratory effort  - Oxygen administered by appropriate delivery if ordered  - Initiate smoking cessation education as indicated  - Encourage broncho-pulmonary hygiene including cough, deep breathe, Incentive Spirometry  - Assess the need for suctioning and aspirate as needed  - Assess and instruct to report SOB or any respiratory difficulty  - Respiratory Therapy support as indicated  Outcome: Progressing

## 2023-03-04 NOTE — PROGRESS NOTES
Vancomycin IV Pharmacy-to-Dose Consultation     Vancomycin has been discontinued  Pharmacy will sign off  Please contact or re-consult with questions      [address] , [address], Pharmacist

## 2023-03-05 LAB
BACTERIA SPEC ANAEROBE CULT: ABNORMAL
BACTERIA SPEC ANAEROBE CULT: ABNORMAL

## 2023-03-06 ENCOUNTER — TRANSITIONAL CARE MANAGEMENT (OUTPATIENT)
Dept: FAMILY MEDICINE CLINIC | Facility: CLINIC | Age: 38
End: 2023-03-06

## 2023-03-06 LAB — BACTERIA BLD CULT: NORMAL

## 2023-03-07 NOTE — UTILIZATION REVIEW
NOTIFICATION OF ADMISSION DISCHARGE   This is a Notification of Discharge from [address]  Please be advised that this patient has been discharge from our facility  Below you will find the admission and discharge date and time including the patient’s disposition  UTILIZATION REVIEW CONTACT:  Verona Salas  Utilization   Network Utilization Review Department  Phone: [phone] x carefully listen to the prompts  All voicemails are confidential   Email: [email or web address]     ADMISSION INFORMATION  PRESENTATION DATE: 3/1/2023 11:40 AM  OBERVATION ADMISSION DATE:   INPATIENT ADMISSION DATE: 3/1/23  1:10 PM   DISCHARGE DATE: 3/4/2023  2:59 PM   DISPOSITION:Home/Self Care    IMPORTANT INFORMATION:  Send all requests for admission clinical reviews, approved or denied determinations and any other requests to dedicated fax number below belonging to the campus where the patient is receiving treatment   List of dedicated fax numbers:  [address] DENIALS (Administrative/Medical Necessity) [phone]   [address] (Maternity/NICU/Pediatrics) [phone]   [facility] [phone]   [facility] 87 [phone]   [facility] [phone]   220 [facility] [phone] 90 [facility] [phone]   [address] [place], [place] [address] [address] [phone]   [facility]  [phone]   [facility] [facility] [phone]   [facility] [address] [facility] [phone]

## 2023-03-09 ENCOUNTER — OFFICE VISIT (OUTPATIENT)
Dept: SURGERY | Facility: CLINIC | Age: 38
End: 2023-03-09

## 2023-03-09 VITALS — BODY MASS INDEX: 36.29 KG/M2 | WEIGHT: 231.2 LBS | TEMPERATURE: 97.9 F | HEIGHT: 67 IN

## 2023-03-09 DIAGNOSIS — K61.1 PERIRECTAL ABSCESS: ICD-10-CM

## 2023-03-09 NOTE — PROGRESS NOTES
[facility] History and Physical Note:    Assessment:  Status post incision and drainage of perirectal abscess  Recovering nicely  Plan:  Patient counseled  Follow-up 2 weeks for wound check  Chief Complaint:  "I am here for follow-up"    HPI  Patient is a 39-year-old woman status post incision and drainage of a perirectal abscess last week  Patient is keeping the area clean in the shower with soap and water and covering incision with gauze daily  She reports feeling remarkable improvement immediately after surgery with decreasing pain since discharge    PMH:  Past Medical History:   Diagnosis Date   • Asthma    • COPD (chronic obstructive pulmonary disease) ([facility] [address] )    • Macular degeneration     right eye   • Periodontitis    • Psychiatric disorder     depression, anxiety   • [facility]eps[facility] 3/1/2023       PSH:  Past Surgical History:   Procedure Laterality Date   •  SECTION     • SD I&D ISCHIORECTAL&/PERIRECTAL ABSCESS SPX N/A 3/1/2023    Procedure: INCISION AND DRAINAGE (I&D) PERIRECTAL ABSCESS;  Surgeon: Mike Golden MD;  Location: [address] [place] [address];  Service: General   • TONSILECTOMY AND ADNOIDECTOMY     • TUBAL LIGATION         Home Meds:  Current Outpatient Medications on File Prior to Visit   Medication Sig Dispense Refill   • amoxicillin-clavulanate (AUGMENTIN) 875-125 mg per tablet Take 1 tablet by mouth every 12 (twelve) hours for 15 doses 15 tablet 0   • busPIRone (BUSPAR) 5 mg tablet TAKE 1 TABLET BY MOUTH TWICE A DAY 60 tablet 0   • doxycycline hyclate (VIBRAMYCIN) 100 mg capsule Take 1 capsule (100 mg total) by mouth every 12 (twelve) hours for 15 doses 15 capsule 0   • polyethylene glycol (MIRALAX) 17 g packet Take 17 g by mouth daily as needed (Constipation) 10 each 0     No current facility-administered medications on file prior to visit         Allergies:  No Active Allergies    Social Hx:  Social History     Socioeconomic History   • Marital status: Legally      Spouse name: Not on file   • Number of children: 2   • Years of education: Not on file   • Highest education level: Some college, no degree   Occupational History   • Not on file   Tobacco Use   • Smoking status: Every Day     Packs/day: 0 50     Years: 25 00     Pack years: 12 50     Types: Cigarettes   • Smokeless tobacco: Never   Vaping Use   • Vaping Use: Never used   Substance and Sexual Activity   • Alcohol use: No     Comment: socially /  never per [employer]   • Drug use: No   • Sexual activity: Yes     Partners: Male     Birth control/protection: None   Other Topics Concern   • Not on file   Social History Narrative   • Not on file     Social Determinants of Health     Financial Resource Strain: Low Risk    • Difficulty of Paying Living Expenses: Not very hard   Food Insecurity: No Food Insecurity   • Worried About Running Out of Food in the Last Year: Never true   • Ran Out of Food in the Last Year: Never true   Transportation Needs: No Transportation Needs   • Lack of Transportation (Medical): No   • Lack of Transportation (Non-Medical): No   Physical Activity: Not on file   Stress: Stress Concern Present   • Feeling of Stress : To some extent   Social Connections: Not on file   Intimate Partner Violence: Not on file   Housing Stability: Not on file        Family Hx:    Family History   Problem Relation Age of Onset   • Hypertension Mother    • Hyperlipidemia Mother    • Arthritis Mother    • Diabetes Mother    • Asthma Mother    • Thyroid disease Mother    • Diabetes Father    • Hypertension Father    • Thyroid disease Sister    • Diabetes Sister    • Thyroid disease Daughter    • Hyperlipidemia Daughter    • Asthma Daughter          Review of Systems   Constitutional: Negative for chills and fever  Respiratory: Negative  Cardiovascular: Negative  Gastrointestinal: Negative  Genitourinary: Negative  All other systems reviewed and are negative        LMP 02/26/2023     Physical Exam  Vitals reviewed  Constitutional:       General: She is not in acute distress  Appearance: Normal appearance  Cardiovascular:      Rate and Rhythm: Normal rate  Pulmonary:      Effort: Pulmonary effort is normal    Abdominal:      General: There is no distension  Neurological:      Mental Status: She is alert  Perianal incision is open and draining nicely  Minimal erythema and induration      Pertinent labs reviewed    Pertinent images and available reads personally reviewed    Pertinent notes reviewed       Julissa Rodriguez MD [address] [facility]  [phone]

## 2023-03-10 ENCOUNTER — OFFICE VISIT (OUTPATIENT)
Dept: FAMILY MEDICINE CLINIC | Facility: CLINIC | Age: 38
End: 2023-03-10

## 2023-03-10 VITALS
BODY MASS INDEX: 35.87 KG/M2 | DIASTOLIC BLOOD PRESSURE: 64 MMHG | SYSTOLIC BLOOD PRESSURE: 124 MMHG | RESPIRATION RATE: 18 BRPM | HEART RATE: 70 BPM | OXYGEN SATURATION: 98 % | WEIGHT: 229 LBS

## 2023-03-10 DIAGNOSIS — R00.2 PALPITATION: ICD-10-CM

## 2023-03-10 DIAGNOSIS — Z76.89 ENCOUNTER FOR SUPPORT AND COORDINATION OF TRANSITION OF CARE: Primary | ICD-10-CM

## 2023-03-10 DIAGNOSIS — Z71.6 ENCOUNTER FOR SMOKING CESSATION COUNSELING: ICD-10-CM

## 2023-03-10 DIAGNOSIS — J44.9 CHRONIC OBSTRUCTIVE PULMONARY DISEASE, UNSPECIFIED COPD TYPE (HCC): ICD-10-CM

## 2023-03-10 PROBLEM — J02.9 SORE THROAT: Status: RESOLVED | Noted: 2023-03-03 | Resolved: 2023-03-10

## 2023-03-10 RX ORDER — ALBUTEROL SULFATE 90 UG/1
2 AEROSOL, METERED RESPIRATORY (INHALATION) EVERY 6 HOURS PRN
Qty: 18 G | Refills: 1 | Status: SHIPPED | OUTPATIENT
Start: 2023-03-10

## 2023-03-10 RX ORDER — NICOTINE 21 MG/24HR
1 PATCH, TRANSDERMAL 24 HOURS TRANSDERMAL EVERY 24 HOURS
Qty: 28 PATCH | Refills: 1 | Status: SHIPPED | OUTPATIENT
Start: 2023-03-10

## 2023-03-10 NOTE — ASSESSMENT & PLAN NOTE
Noted palpitation, left sided chest discomfort, and shortness of breath for the past couple weeks  She stated it was more noticeable after being discharged from the hospital     EKG done on 3/1/2023 indicated possible biatrial enlargement  Due to the presenting symptoms, we ordered Echo
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
## 2023-03-10 NOTE — ASSESSMENT & PLAN NOTE
Noted in the hospital that patient might have COPD upon RT evaluation  Note that patient also history of asthma  Patient was recommended to follow-up with pulmonology for further testing  We started on albuterol inhaler today

## 2023-04-03 ENCOUNTER — CLINICAL SUPPORT (OUTPATIENT)
Dept: FAMILY MEDICINE CLINIC | Facility: CLINIC | Age: 38
End: 2023-04-03

## 2023-04-03 DIAGNOSIS — Z11.1 SCREENING FOR TUBERCULOSIS: Primary | ICD-10-CM

## 2023-04-05 ENCOUNTER — OFFICE VISIT (OUTPATIENT)
Dept: FAMILY MEDICINE CLINIC | Facility: CLINIC | Age: 38
End: 2023-04-05

## 2023-04-05 VITALS
OXYGEN SATURATION: 98 % | HEIGHT: 67 IN | TEMPERATURE: 98.6 F | RESPIRATION RATE: 18 BRPM | HEART RATE: 78 BPM | SYSTOLIC BLOOD PRESSURE: 124 MMHG | BODY MASS INDEX: 35.88 KG/M2 | WEIGHT: 228.6 LBS | DIASTOLIC BLOOD PRESSURE: 80 MMHG

## 2023-04-05 DIAGNOSIS — F41.9 ANXIETY: Primary | ICD-10-CM

## 2023-04-05 PROBLEM — S86.112D GASTROCNEMIUS TEAR, LEFT, SUBSEQUENT ENCOUNTER: Status: RESOLVED | Noted: 2018-07-03 | Resolved: 2023-04-05

## 2023-04-05 PROBLEM — L02.31 LEFT BUTTOCK ABSCESS: Status: RESOLVED | Noted: 2023-03-01 | Resolved: 2023-04-05

## 2023-04-05 PROBLEM — M25.462 EFFUSION OF LEFT KNEE: Status: RESOLVED | Noted: 2018-07-03 | Resolved: 2023-04-05

## 2023-04-05 PROBLEM — R00.2 PALPITATION: Status: RESOLVED | Noted: 2023-03-10 | Resolved: 2023-04-05

## 2023-04-05 LAB
INDURATION: 0 MM
TB SKIN TEST: NEGATIVE

## 2023-04-05 RX ORDER — HYDROXYZINE HYDROCHLORIDE 25 MG/1
25 TABLET, FILM COATED ORAL EVERY 6 HOURS PRN
Qty: 60 TABLET | Refills: 2 | Status: SHIPPED | OUTPATIENT
Start: 2023-04-05

## 2023-04-05 RX ORDER — BUSPIRONE HYDROCHLORIDE 5 MG/1
5 TABLET ORAL 2 TIMES DAILY
Qty: 60 TABLET | Refills: 2 | Status: SHIPPED | OUTPATIENT
Start: 2023-04-05

## 2023-04-05 NOTE — PROGRESS NOTES
[address]    NAME: Wilma Weiss  AGE: 44 y o  SEX: female  : 1985     DATE: 2023     Assessment and Plan:     Problem List Items Addressed This Visit    None  Visit Diagnoses     Anxiety    -  Primary    Relevant Medications    busPIRone (BUSPAR) 5 mg tablet    hydrOXYzine HCL (ATARAX) 25 mg tablet          Immunizations and preventive care screenings were discussed with patient today  Appropriate education was printed on patient's after visit summary  Counseling:  Alcohol/drug use: discussed moderation in alcohol intake, the recommendations for healthy alcohol use, and avoidance of illicit drug use  Dental Health: discussed importance of regular tooth brushing, flossing, and dental visits  Injury prevention: discussed safety/seat belts, safety helmets, smoke detectors, carbon dioxide detectors, and smoking near bedding or upholstery  Sexual health: discussed sexually transmitted diseases, partner selection, use of condoms, avoidance of unintended pregnancy, and contraceptive alternatives  Exercise: the importance of regular exercise/physical activity was discussed  Recommend exercise 3-5 times per week for at least 30 minutes  BMI Counseling: Body mass index is 35 8 kg/m²  The BMI is above normal  Nutrition recommendations include encouraging healthy choices of fruits and vegetables and consuming healthier snacks  Exercise recommendations include exercising 3-5 times per week  Rationale for BMI follow-up plan is due to patient being overweight or obese  No follow-ups on file  Chief Complaint:     Chief Complaint   Patient presents with   • Physical Exam     Physical ,doing okay,needs TB test read it is negative ,needs work form done,no new food or drug allergies         History of Present Illness:     Adult Annual Physical   Patient here for a comprehensive physical exam  The patient reports problems - unable to get eye injections due to cost     Diet and Physical Activity  Diet/Nutrition: poor diet, limited junk food and limited fruits/vegetables  Exercise: walking  Depression Screening  PHQ-2/9 Depression Screening         General Health  Sleep: gets 4-6 hours of sleep on average  Hearing: normal - bilateral   Vision: vision problems: macular degeneration      Dental: regular dental visits, brushes teeth twice daily and flosses teeth occasionally  /GYN Health  Last menstrual period: 4/1/23  Contraceptive method: tubal in 2013  History of STDs?: no      Review of Systems:     Review of Systems   Constitutional: Negative for chills, diaphoresis, fatigue and fever  HENT: Positive for dental problem and sinus pressure  Negative for ear pain, facial swelling, mouth sores, postnasal drip, sinus pain, sore throat and voice change  Eyes: Positive for photophobia  Negative for discharge, redness and itching  Respiratory: Positive for cough, shortness of breath and wheezing  Cardiovascular: Negative for chest pain, palpitations and leg swelling  Gastrointestinal: Negative for constipation, diarrhea, nausea and vomiting  Endocrine: Negative  Genitourinary: Negative for difficulty urinating, dyspareunia, dysuria, enuresis, hematuria, menstrual problem, vaginal bleeding and vaginal discharge  Musculoskeletal: Positive for back pain  Negative for neck pain  Skin: Negative  Allergic/Immunologic: Negative  Neurological: Negative  Hematological: Negative  Psychiatric/Behavioral: Negative         Past Medical History:     Past Medical History:   Diagnosis Date   • Asthma    • COPD (chronic obstructive pulmonary disease) ([facility] [address] )    • Effusion of left knee 7/3/2018   • Gastrocnemius tear, left, subsequent encounter 7/3/2018   • Left buttock abscess 3/1/2023   • Macular degeneration     right eye   • Palpitation 3/10/2023   • Periodontitis    • Psychiatric disorder     depression, anxiety   • Sepsis ([facility] [address] ) 3/1/2023      Past Surgical History:     Past Surgical History:   Procedure Laterality Date   •  SECTION     • RI I&D ISCHIORECTAL&/PERIRECTAL ABSCESS SPX N/A 3/1/2023    Procedure: INCISION AND DRAINAGE (I&D) PERIRECTAL ABSCESS;  Surgeon: Caden Wong MD;  Location: [facility];  Service: General   • TONSILECTOMY AND ADNOIDECTOMY     • TUBAL LIGATION        Social History:     Social History     Socioeconomic History   • Marital status: Legally      Spouse name: None   • Number of children: 2   • Years of education: None   • Highest education level: Some college, no degree   Occupational History   • None   Tobacco Use   • Smoking status: Every Day     Packs/day: 0 50     Years: 25 00     Pack years: 12 50     Types: Cigarettes   • Smokeless tobacco: Never   Vaping Use   • Vaping Use: Never used   Substance and Sexual Activity   • Alcohol use: No     Comment: socially /  never per [employer]   • Drug use: No   • Sexual activity: Yes     Partners: Male     Birth control/protection: None   Other Topics Concern   • None   Social History Narrative   • None     Social Determinants of Health     Financial Resource Strain: Low Risk    • Difficulty of Paying Living Expenses: Not very hard   Food Insecurity: No Food Insecurity   • Worried About Running Out of Food in the Last Year: Never true   • Ran Out of Food in the Last Year: Never true   Transportation Needs: No Transportation Needs   • Lack of Transportation (Medical): No   • Lack of Transportation (Non-Medical): No   Physical Activity: Not on file   Stress: Stress Concern Present   • Feeling of Stress :  To some extent   Social Connections: Not on file   Intimate Partner Violence: Not on file   Housing Stability: Not on file      Family History:     Family History   Problem Relation Age of Onset   • Hypertension Mother    • Hyperlipidemia Mother    • Arthritis Mother    • Diabetes Mother    • Asthma Mother    • Thyroid "disease Mother    • Diabetes Father    • Hypertension Father    • Thyroid disease Sister    • Diabetes Sister    • Thyroid disease Daughter    • Hyperlipidemia Daughter    • Asthma Daughter       Current Medications:     Current Outpatient Medications   Medication Sig Dispense Refill   • albuterol (Ventolin HFA) 90 mcg/act inhaler Inhale 2 puffs every 6 (six) hours as needed for wheezing 18 g 1   • busPIRone (BUSPAR) 5 mg tablet Take 1 tablet (5 mg total) by mouth 2 (two) times a day 60 tablet 2   • hydrOXYzine HCL (ATARAX) 25 mg tablet Take 1 tablet (25 mg total) by mouth every 6 (six) hours as needed for anxiety 60 tablet 2   • polyethylene glycol (MIRALAX) 17 g packet Take 17 g by mouth daily as needed (Constipation) 10 each 0     No current facility-administered medications for this visit  Allergies:     No Known Allergies   Physical Exam:     /80 (BP Location: Left arm, Patient Position: Sitting, Cuff Size: Standard)   Pulse 78   Temp 98 6 °F (37 °C) (Tympanic Core)   Resp 18   Ht 5' 7\" (1 702 m)   Wt 104 kg (228 lb 9 6 oz)   LMP 04/01/2023   SpO2 98%   BMI 35 80 kg/m²     Physical Exam  Vitals and nursing note reviewed  Constitutional:       General: She is not in acute distress  Appearance: She is well-developed  HENT:      Head: Normocephalic and atraumatic  Right Ear: External ear normal       Left Ear: External ear normal       Nose: Nose normal       Mouth/Throat:      Mouth: Mucous membranes are moist       Pharynx: Oropharynx is clear  Comments: Missing teeth  Eyes:      Conjunctiva/sclera: Conjunctivae normal    Cardiovascular:      Rate and Rhythm: Normal rate and regular rhythm  Heart sounds: No murmur heard  Pulmonary:      Effort: Pulmonary effort is normal  No respiratory distress  Breath sounds: Normal breath sounds  Abdominal:      Palpations: Abdomen is soft  Tenderness: There is no abdominal tenderness     Musculoskeletal:         General: " No swelling  Cervical back: Neck supple  Skin:     General: Skin is warm and dry  Capillary Refill: Capillary refill takes less than 2 seconds  Neurological:      General: No focal deficit present  Mental Status: She is alert and oriented to person, place, and time  Mental status is at baseline  Psychiatric:         Mood and Affect: Mood is anxious            Tashia Garcia MD   [address]

## 2023-05-01 ENCOUNTER — OFFICE VISIT (OUTPATIENT)
Dept: URGENT CARE | Facility: CLINIC | Age: 38
End: 2023-05-01

## 2023-05-01 VITALS
TEMPERATURE: 97 F | HEART RATE: 62 BPM | RESPIRATION RATE: 18 BRPM | WEIGHT: 230 LBS | OXYGEN SATURATION: 99 % | HEIGHT: 67 IN | BODY MASS INDEX: 36.1 KG/M2

## 2023-05-01 DIAGNOSIS — H10.31 ACUTE BACTERIAL CONJUNCTIVITIS OF RIGHT EYE: Primary | ICD-10-CM

## 2023-05-01 RX ORDER — OFLOXACIN 3 MG/ML
1 SOLUTION/ DROPS OPHTHALMIC 4 TIMES DAILY
Qty: 5 ML | Refills: 0 | Status: SHIPPED | OUTPATIENT
Start: 2023-05-01

## 2023-05-01 NOTE — LETTER
May 1, 2023     Patient: Deysi Sanz  YOB: 1985  Date of Visit: 5/1/2023      To Whom it May Concern:    Violetta Raymond is under my professional care  Wilma was seen in my office on 5/1/2023  Wilma may return to work on 5/3/23  If you have any questions or concerns, please don't hesitate to call           Sincerely,          Shanika Malcolm PA-C        CC: No Recipients

## 2023-05-01 NOTE — PROGRESS NOTES
[employer]        NAME: Beverly Orellana is a 44 y o  female  : 1985    MRN: [id number]  DATE: May 1, 2023  TIME: 1:50 PM    Assessment and Plan   Acute bacterial conjunctivitis of right eye [H10 31]  1  Acute bacterial conjunctivitis of right eye  ofloxacin (OCUFLOX) 0 3 % ophthalmic solution            Patient Instructions   Patient Instructions     Conjunctivitis   WHAT YOU NEED TO KNOW:   Conjunctivitis, or pink eye, is inflammation of your conjunctiva  The conjunctiva is a thin tissue that covers the front of your eye and the back of your eyelids  The conjunctiva helps protect your eye and keep it moist  Conjunctivitis may be caused by bacteria, allergies, or a virus  If your conjunctivitis is caused by bacteria, it may get better on its own in about 7 days  Viral conjunctivitis can last up to 3 weeks  DISCHARGE INSTRUCTIONS:   Return to the emergency department if:    You have worsening eye pain   The swelling in your eye gets worse, even after treatment   Your vision suddenly becomes worse or you cannot see at all  Call your doctor if:   Royce Tiesha develop a fever and ear pain   You have tiny bumps or spots of blood on your eye   You have questions or concerns about your condition or care  Manage your symptoms:    Apply a cool compress  Wet a washcloth with cold water and place it on your eye  This will help decrease itching and irritation   Do not wear contact lenses  They can irritate your eye  Throw away the pair you are using and ask when you can wear them again  Use a new pair of lenses when your provider says it is okay   Avoid irritants  Stay away from smoke filled areas  Shield your eyes from wind and sun   Flush your eye  You may need to flush your eye with saline to help decrease your symptoms  Ask for more information on how to flush your eye  Medicines:  Treatment depends on what is causing your conjunctivitis   You may be given any of the following:   Allergy medicine  helps decrease itchy, red, swollen eyes caused by allergies  It may be given as a pill, eye drops, or nasal spray   Antibiotics  may be needed if your conjunctivitis is caused by bacteria  This medicine may be given as a pill, eye drops, or eye ointment   Take your medicine as directed  Contact your healthcare provider if you think your medicine is not helping or if you have side effects  Tell your provider if you are allergic to any medicine  Keep a list of the medicines, vitamins, and herbs you take  Include the amounts, and when and why you take them  Bring the list or the pill bottles to follow-up visits  Carry your medicine list with you in case of an emergency  Prevent the spread of conjunctivitis:   [facility] your hands with soap and water often  Wash your hands before and after you touch your eyes  Also wash your hands before you prepare or eat food and after you use the bathroom or change a diaper   Avoid allergens  Try to avoid the things that cause your allergies, such as pets, dust, or grass   Avoid contact with others  Do not share towels or washcloths  Try to stay away from others as much as possible  Ask when you can return to work or school   Throw away eye makeup  The bacteria that caused your conjunctivitis can stay in eye makeup  Throw away your current mascara and other eye makeup  Never share mascara or other eye makeup with anyone  Follow up with your doctor as directed:  Write down your questions so you remember to ask them during your visits  © Copyright [facility] 2022 Information is for End User's use only and may not be sold, redistributed or otherwise used for commercial purposes  The above information is an  only  It is not intended as medical advice for individual conditions or treatments   Talk to your doctor, nurse or pharmacist before following any medical regimen to see if it is safe and effective for you  Follow up with PCP in 3-5 days  Proceed to  ER if symptoms worsen  Chief Complaint     Chief Complaint   Patient presents with    comjunctivitis     Red ,exudate possible foreign body         History of Present Illness       The patient is a 27-year-old female presenting today for right red, pruritic, purulent eye x1 day  Patient reports she woke up this morning with crustiness discharge out of her right eye  She reports generalized pruritus in this eye  She works at a   She has not tried anything  She denied any exacerbating factors  She denies any fevers, chills, night sweats, shortness of breath, chest pain, changes in vision, pain with extraocular movements  Review of Systems   Review of Systems   Eyes: Positive for discharge, redness and itching  Negative for photophobia, pain and visual disturbance           Current Medications       Current Outpatient Medications:     busPIRone (BUSPAR) 5 mg tablet, Take 1 tablet (5 mg total) by mouth 2 (two) times a day, Disp: 60 tablet, Rfl: 2    hydrOXYzine HCL (ATARAX) 25 mg tablet, Take 1 tablet (25 mg total) by mouth every 6 (six) hours as needed for anxiety, Disp: 60 tablet, Rfl: 2    ofloxacin (OCUFLOX) 0 3 % ophthalmic solution, Administer 1 drop to the right eye 4 (four) times a day, Disp: 5 mL, Rfl: 0    polyethylene glycol (MIRALAX) 17 g packet, Take 17 g by mouth daily as needed (Constipation), Disp: 10 each, Rfl: 0    albuterol (Ventolin HFA) 90 mcg/act inhaler, Inhale 2 puffs every 6 (six) hours as needed for wheezing (Patient not taking: Reported on 5/1/2023), Disp: 18 g, Rfl: 1    Current Allergies     Allergies as of 05/01/2023    (No Known Allergies)            The following portions of the patient's history were reviewed and updated as appropriate: allergies, current medications, past family history, past medical history, past social history, past surgical history and problem list      Past Medical "History:   Diagnosis Date    Asthma     COPD (chronic obstructive pulmonary disease) (HCC)     Effusion of left knee 7/3/2018    Gastrocnemius tear, left, subsequent encounter 7/3/2018    Left buttock abscess 3/1/2023    Macular degeneration     right eye    Palpitation 3/10/2023    Periodontitis     Psychiatric disorder     depression, anxiety    Sepsis ([address] ) 3/1/2023       Past Surgical History:   Procedure Laterality Date     SECTION      NV I&D ISCHIORECTAL&/PERIRECTAL ABSCESS SPX N/A 3/1/2023    Procedure: INCISION AND DRAINAGE (I&D) PERIRECTAL ABSCESS;  Surgeon: Slim Ruano MD;  Location: [address] [place] [address];  Service: General    TONSILECTOMY AND ADNOIDECTOMY      TUBAL LIGATION         Family History   Problem Relation Age of Onset    Hypertension Mother    Albertoannita Cordon Hyperlipidemia Mother    Albertoannita Cordon Arthritis Mother     Diabetes Mother     Asthma Mother     Thyroid disease Mother     Diabetes Father     Hypertension Father     Thyroid disease Sister     Diabetes Sister     Thyroid disease Daughter     Hyperlipidemia Daughter     Asthma Daughter          Medications have been verified  Objective   Pulse 62   Temp (!) 97 °F (36 1 °C)   Resp 18   Ht 5' 7\" (1 702 m)   Wt 104 kg (230 lb)   LMP 2023   SpO2 99%   BMI 36 02 kg/m²        Physical Exam     Physical Exam  Constitutional:       General: She is not in acute distress  Appearance: Normal appearance  She is normal weight  She is not ill-appearing, toxic-appearing or diaphoretic  Eyes:      General: No scleral icterus  Right eye: No discharge  Left eye: No discharge  Extraocular Movements: Extraocular movements intact  Conjunctiva/sclera: Conjunctivae normal       Comments: Erythema of right eye with slight swelling of the lids   Cardiovascular:      Rate and Rhythm: Normal rate  Pulmonary:      Effort: Pulmonary effort is normal    Neurological:      Mental Status: She is alert   "

## 2023-05-01 NOTE — PATIENT INSTRUCTIONS
Conjunctivitis   WHAT YOU NEED TO KNOW:   Conjunctivitis, or pink eye, is inflammation of your conjunctiva  The conjunctiva is a thin tissue that covers the front of your eye and the back of your eyelids  The conjunctiva helps protect your eye and keep it moist  Conjunctivitis may be caused by bacteria, allergies, or a virus  If your conjunctivitis is caused by bacteria, it may get better on its own in about 7 days  Viral conjunctivitis can last up to 3 weeks  DISCHARGE INSTRUCTIONS:   Return to the emergency department if:   You have worsening eye pain  The swelling in your eye gets worse, even after treatment  Your vision suddenly becomes worse or you cannot see at all  Call your doctor if:   You develop a fever and ear pain  You have tiny bumps or spots of blood on your eye  You have questions or concerns about your condition or care  Manage your symptoms:   Apply a cool compress  Wet a washcloth with cold water and place it on your eye  This will help decrease itching and irritation  Do not wear contact lenses  They can irritate your eye  Throw away the pair you are using and ask when you can wear them again  Use a new pair of lenses when your provider says it is okay  Avoid irritants  Stay away from smoke filled areas  Shield your eyes from wind and sun  Flush your eye  You may need to flush your eye with saline to help decrease your symptoms  Ask for more information on how to flush your eye  Medicines:  Treatment depends on what is causing your conjunctivitis  You may be given any of the following: Allergy medicine  helps decrease itchy, red, swollen eyes caused by allergies  It may be given as a pill, eye drops, or nasal spray  Antibiotics  may be needed if your conjunctivitis is caused by bacteria  This medicine may be given as a pill, eye drops, or eye ointment  Take your medicine as directed    Contact your healthcare provider if you think your medicine is not helping or if you have side effects  Tell your provider if you are allergic to any medicine  Keep a list of the medicines, vitamins, and herbs you take  Include the amounts, and when and why you take them  Bring the list or the pill bottles to follow-up visits  Carry your medicine list with you in case of an emergency  Prevent the spread of conjunctivitis:   Wash your hands with soap and water often  Wash your hands before and after you touch your eyes  Also wash your hands before you prepare or eat food and after you use the bathroom or change a diaper  Avoid allergens  Try to avoid the things that cause your allergies, such as pets, dust, or grass  Avoid contact with others  Do not share towels or washcloths  Try to stay away from others as much as possible  Ask when you can return to work or school  Throw away eye makeup  The bacteria that caused your conjunctivitis can stay in eye makeup  Throw away your current mascara and other eye makeup  Never share mascara or other eye makeup with anyone  Follow up with your doctor as directed:  Write down your questions so you remember to ask them during your visits  © Copyright [employer] 2022 Information is for End User's use only and may not be sold, redistributed or otherwise used for commercial purposes  The above information is an  only  It is not intended as medical advice for individual conditions or treatments  Talk to your doctor, nurse or pharmacist before following any medical regimen to see if it is safe and effective for you

## 2023-07-06 DIAGNOSIS — F41.9 ANXIETY: ICD-10-CM

## 2023-07-06 RX ORDER — BUSPIRONE HYDROCHLORIDE 5 MG/1
5 TABLET ORAL 2 TIMES DAILY
Qty: 60 TABLET | Refills: 3 | Status: SHIPPED | OUTPATIENT
Start: 2023-07-06 | End: 2023-08-05

## 2023-08-31 ENCOUNTER — APPOINTMENT (EMERGENCY)
Dept: RADIOLOGY | Facility: HOSPITAL | Age: 38
End: 2023-08-31
Payer: COMMERCIAL

## 2023-08-31 ENCOUNTER — HOSPITAL ENCOUNTER (EMERGENCY)
Facility: HOSPITAL | Age: 38
Discharge: HOME | End: 2023-08-31
Attending: EMERGENCY MEDICINE
Payer: COMMERCIAL

## 2023-08-31 VITALS
HEART RATE: 72 BPM | DIASTOLIC BLOOD PRESSURE: 99 MMHG | HEIGHT: 67 IN | BODY MASS INDEX: 36.1 KG/M2 | OXYGEN SATURATION: 99 % | WEIGHT: 230 LBS | RESPIRATION RATE: 20 BRPM | SYSTOLIC BLOOD PRESSURE: 163 MMHG | TEMPERATURE: 99.3 F

## 2023-08-31 DIAGNOSIS — S63.501A SPRAIN OF RIGHT WRIST, INITIAL ENCOUNTER: Primary | ICD-10-CM

## 2023-08-31 DIAGNOSIS — S63.91XA SPRAIN OF RIGHT HAND, INITIAL ENCOUNTER: ICD-10-CM

## 2023-08-31 DIAGNOSIS — M54.50 BACK PAIN, LUMBOSACRAL: ICD-10-CM

## 2023-08-31 PROCEDURE — 99284 EMERGENCY DEPT VISIT MOD MDM: CPT

## 2023-08-31 PROCEDURE — 72220 X-RAY EXAM SACRUM TAILBONE: CPT

## 2023-08-31 PROCEDURE — 73110 X-RAY EXAM OF WRIST: CPT

## 2023-08-31 PROCEDURE — 99284 EMERGENCY DEPT VISIT MOD MDM: CPT | Performed by: EMERGENCY MEDICINE

## 2023-08-31 PROCEDURE — 72100 X-RAY EXAM L-S SPINE 2/3 VWS: CPT

## 2023-08-31 PROCEDURE — 73130 X-RAY EXAM OF HAND: CPT

## 2023-08-31 RX ORDER — LIDOCAINE 50 MG/G
1 PATCH TOPICAL ONCE
Status: DISCONTINUED | OUTPATIENT
Start: 2023-08-31 | End: 2023-08-31 | Stop reason: HOSPADM

## 2023-08-31 RX ORDER — TRAMADOL HYDROCHLORIDE 50 MG/1
50 TABLET ORAL EVERY 6 HOURS PRN
Qty: 6 TABLET | Refills: 0 | Status: SHIPPED | OUTPATIENT
Start: 2023-08-31 | End: 2023-09-10

## 2023-08-31 RX ORDER — LIDOCAINE 50 MG/G
1 PATCH TOPICAL DAILY
Qty: 15 PATCH | Refills: 0 | Status: SHIPPED | OUTPATIENT
Start: 2023-08-31

## 2023-08-31 RX ORDER — CYCLOBENZAPRINE HCL 10 MG
10 TABLET ORAL ONCE
Status: COMPLETED | OUTPATIENT
Start: 2023-08-31 | End: 2023-08-31

## 2023-08-31 RX ORDER — NAPROXEN 500 MG/1
500 TABLET ORAL 2 TIMES DAILY WITH MEALS
Qty: 30 TABLET | Refills: 0 | Status: SHIPPED | OUTPATIENT
Start: 2023-08-31

## 2023-08-31 RX ADMIN — CYCLOBENZAPRINE 10 MG: 10 TABLET, FILM COATED ORAL at 19:11

## 2023-08-31 RX ADMIN — LIDOCAINE 5% 1 PATCH: 700 PATCH TOPICAL at 19:11

## 2023-08-31 NOTE — Clinical Note
Wilma Garcia was seen and treated in our emergency department on 8/31/2023. limit use of right hand until cleared by a physician    Diagnosis:     Wilma  . She may return on this date: If you have any questions or concerns, please don't hesitate to call.       Damon Mcmanus, DO    ______________________________           _______________          _______________  Hospital Representative                              Date                                Time

## 2023-08-31 NOTE — ED PROVIDER NOTES
History  Chief Complaint   Patient presents with   • Hand Pain     Patient injured right hand and pinky finger right hand while moving bed     Patient is a 26-year-old right-hand-dominant female who presents emergency department with complaint of pain to her right hand and wrist after she accidentally hit it against the frame of the bed. She also complains of low back pain which began after she fell onto the same bed frame. Patient is moving into a new home today. Patient has taken a total of 800 mg of ibuprofen prior to coming to the ED. History provided by:  Patient   used: No    Hand Pain  Associated symptoms: no abdominal pain, no cough, no diarrhea, no fever, no nausea, no rash, no shortness of breath and no vomiting        Prior to Admission Medications   Prescriptions Last Dose Informant Patient Reported? Taking?    albuterol (Ventolin HFA) 90 mcg/act inhaler   No No   Sig: Inhale 2 puffs every 6 (six) hours as needed for wheezing   Patient not taking: Reported on 5/1/2023   busPIRone (BUSPAR) 5 mg tablet   No No   Sig: Take 1 tablet (5 mg total) by mouth 2 (two) times a day   hydrOXYzine HCL (ATARAX) 25 mg tablet   No No   Sig: Take 1 tablet (25 mg total) by mouth every 6 (six) hours as needed for anxiety   ofloxacin (OCUFLOX) 0.3 % ophthalmic solution   No No   Sig: Administer 1 drop to the right eye 4 (four) times a day   polyethylene glycol (MIRALAX) 17 g packet   No No   Sig: Take 17 g by mouth daily as needed (Constipation)      Facility-Administered Medications: None       Past Medical History:   Diagnosis Date   • Asthma    • COPD (chronic obstructive pulmonary disease) ([facility])    • Effusion of left knee 7/3/2018   • Gastrocnemius tear, left, subsequent encounter 7/3/2018   • Left buttock abscess 3/1/2023   • Macular degeneration     right eye   • Palpitation 3/10/2023   • Periodontitis    • Psychiatric disorder     depression, anxiety   • Sepsis ([address]) 3/1/2023       Past Surgical History:   Procedure Laterality Date   •  SECTION     • [place] I&D ISCHIORECTAL&/PERIRECTAL ABSCESS SPX N/A 3/1/2023    Procedure: INCISION AND DRAINAGE (I&D) PERIRECTAL ABSCESS;  Surgeon: Vani Gomez MD;  Location: [facility];  Service: General   • TONSILECTOMY AND ADNOIDECTOMY     • TUBAL LIGATION         Family History   Problem Relation Age of Onset   • Hypertension Mother    • Hyperlipidemia Mother    • Arthritis Mother    • Diabetes Mother    • Asthma Mother    • Thyroid disease Mother    • Diabetes Father    • Hypertension Father    • Thyroid disease Sister    • Diabetes Sister    • Thyroid disease Daughter    • Hyperlipidemia Daughter    • Asthma Daughter      I have reviewed and agree with the history as documented. E-Cigarette/Vaping   • E-Cigarette Use Never User      E-Cigarette/Vaping Substances   • Nicotine No    • THC No    • CBD No    • Flavoring No    • Other No    • Unknown No      Social History     Tobacco Use   • Smoking status: Every Day     Packs/day: 0.50     Years: 25.00     Total pack years: 12.50     Types: Cigarettes   • Smokeless tobacco: Never   Vaping Use   • Vaping Use: Never used   Substance Use Topics   • Alcohol use: No     Comment: socially /  never per [employer]   • Drug use: No       Review of Systems   Constitutional: Negative for chills and fever. Respiratory: Negative for cough, chest tightness and shortness of breath. Gastrointestinal: Negative for abdominal pain, diarrhea, nausea and vomiting. Genitourinary: Negative for dysuria, frequency, hematuria and urgency. Musculoskeletal: Positive for gait problem. Negative for back pain, neck pain and neck stiffness. Skin: Negative for color change, pallor, rash and wound. All other systems reviewed and are negative. Physical Exam  Physical Exam  Vitals and nursing note reviewed. Constitutional:       General: She is not in acute distress. Appearance: She is well-developed.  She is not diaphoretic. HENT:      Head: Normocephalic and atraumatic. Eyes:      Extraocular Movements: Extraocular movements intact. Conjunctiva/sclera: Conjunctivae normal.      Pupils: Pupils are equal, round, and reactive to light. Pulmonary:      Effort: Pulmonary effort is normal. No respiratory distress. Musculoskeletal:         General: Tenderness and signs of injury present. No swelling. Right elbow: Normal.      Right forearm: Normal.      Right wrist: Tenderness and bony tenderness present. No swelling, deformity, effusion, lacerations or snuff box tenderness. Decreased range of motion. Right hand: Deformity, tenderness and bony tenderness present. No swelling or lacerations. Decreased range of motion. Hands:       Cervical back: Normal range of motion and neck supple. Thoracic back: Normal.      Lumbar back: Tenderness and bony tenderness present. Decreased range of motion. Back:    Skin:     General: Skin is warm and dry. Capillary Refill: Capillary refill takes less than 2 seconds. Coloration: Skin is not pale. Findings: No rash. Neurological:      General: No focal deficit present. Mental Status: She is alert and oriented to person, place, and time. Cranial Nerves: No cranial nerve deficit.    Psychiatric:         Behavior: Behavior normal.         Vital Signs  ED Triage Vitals [08/31/23 1806]   Temperature Pulse Respirations Blood Pressure SpO2   99.3 °F (37.4 °C) 72 20 163/99 99 %      Temp Source Heart Rate Source Patient Position - Orthostatic VS BP Location FiO2 (%)   Tympanic Monitor Sitting Left arm --      Pain Score       --           Vitals:    08/31/23 1806   BP: 163/99   Pulse: 72   Patient Position - Orthostatic VS: Sitting         Visual Acuity      ED Medications  Medications   cyclobenzaprine (FLEXERIL) tablet 10 mg (10 mg Oral Given 8/31/23 1911)       Diagnostic Studies  Results Reviewed     None                 XR wrist 3+ views RIGHT   ED Interpretation by Wayne Robison DO (08/31 1859)   nad      XR hand 3+ views RIGHT   ED Interpretation by Wayne Robison DO (08/31 1859)   nad      XR spine lumbar 2 or 3 views injury   ED Interpretation by Wayne Robison DO (08/31 1859)   nad      XR sacrum and coccyx   ED Interpretation by Wayne Robison DO (08/31 1859)   nad                 Procedures  Orthopedic injury treatment    Date/Time: 8/31/2023 7:00 PM    Performed by: Wayne Robison DO  Authorized by: Wayne Robison DO    Patient Location:  ED  [place] Protocol:  Procedure performed by: (Angie CANTU)  Consent: Verbal consent obtained. Risks and benefits: risks, benefits and alternatives were discussed  Consent given by: patient  Time out: Immediately prior to procedure a "time out" was called to verify the correct patient, procedure, equipment, support staff and site/side marked as required. Timeout called at: 8/31/2023 7:00 PM.  Patient understanding: patient states understanding of the procedure being performed  Patient consent: the patient's understanding of the procedure matches consent given  Required items: required blood products, implants, devices, and special equipment available  Patient identity confirmed: verbally with patient      Injury location:  Wrist  Location details:  Right wrist  Injury type:   Soft tissue  Neurovascular status: Neurovascularly intact    Distal perfusion: normal    Neurological function: normal    Range of motion: reduced    Local anesthesia used?: No    General anesthesia used?: No    Skeletal traction used?: No    Immobilization:  Splint  Supplies used:  Aluminum splint  Neurovascular status: Neurovascularly intact    Distal perfusion: normal    Neurological function: normal    Range of motion: unchanged    Patient tolerance:  Patient tolerated the procedure well with no immediate complications             ED Course                               SBIRT 22yo+ Flowsheet Row Most Recent Value   Initial Alcohol Screen: US AUDIT-C     1. How often do you have a drink containing alcohol? 0 Filed at: 08/31/2023 1808   2. How many drinks containing alcohol do you have on a typical day you are drinking? 0 Filed at: 08/31/2023 1808   3a. Male UNDER 65: How often do you have five or more drinks on one occasion? 0 Filed at: 08/31/2023 1808   3b. FEMALE Any Age, or MALE 65+: How often do you have 4 or more drinks on one occassion? 0 Filed at: 08/31/2023 1808   Audit-C Score 0 Filed at: 08/31/2023 2239   SAMANTHA: How many times in the past year have you. .. Used an illegal drug or used a prescription medication for non-medical reasons? Never Filed at: 08/31/2023 4972                    Medical Decision Making  Patient with complaint of right hand/wrist/back pain after an injury. X-rays ordered and reviewed-negative for acute pathology. Patient will be discharged to home with a prescription for tramadol, naproxen and Lidoderm patches. Lidoderm patch and Flexeril x1 administered in the ED prior to discharge. Patient agrees with discharge to home with outpatient follow-up with orthopedics. Amount and/or Complexity of Data Reviewed  Radiology: ordered and independent interpretation performed. Risk  Prescription drug management.           Disposition  Final diagnoses:   Sprain of right wrist, initial encounter   Sprain of right hand, initial encounter   Back pain, lumbosacral     Time reflects when diagnosis was documented in both MDM as applicable and the Disposition within this note     Time User Action Codes Description Comment    8/31/2023  7:00 PM Kami, [address] Sprain of right wrist, initial encounter     8/31/2023  7:00 PM Kami, [address] Sprain of right hand, initial encounter     8/31/2023  7:00 PM Holden Rivera Add [M54.50] Back pain, lumbosacral       ED Disposition     ED Disposition   Discharge    Condition Stable    Date/Time   u Aug 31, 2023  6:59 PM    Comment   Wilma Weiss discharge to home/self care.                Follow-up Information     Follow up With Specialties Details Why Contact Info Additional [address] Specialists [facility] Orthopedic Surgery Schedule an appointment as soon as possible for a visit in 1 day for follow up [address] , [place] [address] [facility] [address] [facility] [address] Specialists [facility], [address] [facility] [address] [facility] [address], [address] [facility], [address] [facility]          Discharge Medication List as of 8/31/2023  7:06 PM      START taking these medications    Details   lidocaine (Lidoderm) 5 % Apply 1 patch topically over 12 hours daily Remove & Discard patch within 12 hours or as directed by MD, Starting Thu 8/31/2023, Normal      naproxen (Naprosyn) 500 mg tablet Take 1 tablet (500 mg total) by mouth 2 (two) times a day with meals, Starting Thu 8/31/2023, Normal      traMADol (Ultram) 50 mg tablet Take 1 tablet (50 mg total) by mouth every 6 (six) hours as needed for moderate pain for up to 10 days, Starting Thu 8/31/2023, Until Sun 9/10/2023 at 2359, Normal         CONTINUE these medications which have NOT CHANGED    Details   albuterol (Ventolin HFA) 90 mcg/act inhaler Inhale 2 puffs every 6 (six) hours as needed for wheezing, Starting Fri 3/10/2023, Normal      busPIRone (BUSPAR) 5 mg tablet Take 1 tablet (5 mg total) by mouth 2 (two) times a day, Starting Thu 7/6/2023, Until Sat 8/5/2023, Normal      hydrOXYzine HCL (ATARAX) 25 mg tablet Take 1 tablet (25 mg total) by mouth every 6 (six) hours as needed for anxiety, Starting Wed 4/5/2023, Normal      ofloxacin (OCUFLOX) 0.3 % ophthalmic solution Administer 1 drop to the right eye 4 (four) times a day, Starting Mon 5/1/2023, Normal      polyethylene glycol (MIRALAX) 17 g packet Take 17 g by mouth daily as needed (Constipation), Starting Sat 3/4/2023, Normal             No discharge procedures on file.     PDMP Review     None          ED Provider  Electronically Signed by           Mohit Prasad DO  09/01/23 8822

## 2023-08-31 NOTE — ED NOTES
Patient back from x-ray now c/o of 10/10 pain. Will let Dr. Monse Becker know.      Madonna Patricia RN  08/31/23 1741

## 2023-09-01 ENCOUNTER — VBI (OUTPATIENT)
Dept: FAMILY MEDICINE CLINIC | Facility: CLINIC | Age: 38
End: 2023-09-01

## 2023-09-01 NOTE — LETTER
[place] [id number]    Date: 09/12/23  [address] [facility] [address]    Dear Oli Birmingham:                                                                                                                              Thank you for choosing [facility] emergency department for care. Your primary care provider wants to make sure that your ongoing medical care is being addressed. If you require follow up care as a result of your emergency department visit, there are a few things the practice would like you to know. As part of the network's continuing commitment to caring for our patients, we have added more same day appointments and have extended office hours to meet your medical needs. After hours, on-call physicians are available via your primary care provider's main office line. We encourage you to contact our office prior to seeking treatment to discuss your symptoms with the medical staff. Together, we can determine the correct course of action. A majority of non-emergent conditions such as: common cold, flu-like symptoms, fevers, strains/sprains, dislocations, minor burns, cuts and animal bites can be treated at [facility] facilities. Diagnostic testing is available at some sites. Of course, if you are experiencing a life threatening medical emergency call 911 or proceed directly to the nearest emergency room.     Your nearest [facility] facility is conveniently located at:    [facility] [address] [facility], 1974 [address]  [phone]  SKIP THE WAIT  Conveniently offered at most [facility] locations  [address] [place] [address] Ave your spot online at [email or web address] or on the [address] [place] [address] Drive    Sincerely,    [address] [facility]  Dept: [phone]

## 2023-09-01 NOTE — TELEPHONE ENCOUNTER
09/01/23 12:54 PM    Patient contacted post ED visit, first outreach attempt made. Message was left for patient to return a call to the VBI Department at [facility]: Phone [phone]. Thank you.   Lucille Dahl  [facility]

## 2023-09-05 DIAGNOSIS — F41.9 ANXIETY: ICD-10-CM

## 2023-09-05 RX ORDER — HYDROXYZINE HYDROCHLORIDE 25 MG/1
25 TABLET, FILM COATED ORAL EVERY 6 HOURS PRN
Qty: 60 TABLET | Refills: 2 | Status: SHIPPED | OUTPATIENT
Start: 2023-09-05

## 2023-09-08 NOTE — TELEPHONE ENCOUNTER
09/08/23 12:33 PM    Patient contacted post ED visit, second outreach attempt made. Message was left for patient to return a call to the VBI Department at [facility]: Phone [phone]. Thank you.   Lucille Dahl  [facility]

## 2023-09-12 NOTE — TELEPHONE ENCOUNTER
09/12/23 12:41 PM    Patient contacted post ED visit, third outreach attempt made. Message was left for patient to return a call to either the VBI Department at [facility]: Phone [phone] or the PCP office. Thank you.   Lucille Dahl  [facility]

## 2023-09-25 DIAGNOSIS — F41.9 ANXIETY: ICD-10-CM

## 2023-09-25 RX ORDER — HYDROXYZINE HYDROCHLORIDE 25 MG/1
25 TABLET, FILM COATED ORAL EVERY 6 HOURS PRN
Qty: 180 TABLET | Refills: 1 | Status: SHIPPED | OUTPATIENT
Start: 2023-09-25

## 2023-10-02 DIAGNOSIS — F41.9 ANXIETY: ICD-10-CM

## 2023-10-03 RX ORDER — BUSPIRONE HYDROCHLORIDE 5 MG/1
5 TABLET ORAL 2 TIMES DAILY
Qty: 60 TABLET | Refills: 3 | Status: SHIPPED | OUTPATIENT
Start: 2023-10-03 | End: 2023-11-02

## 2023-12-29 DIAGNOSIS — F41.9 ANXIETY: ICD-10-CM

## 2023-12-29 RX ORDER — BUSPIRONE HYDROCHLORIDE 5 MG/1
5 TABLET ORAL 2 TIMES DAILY
Qty: 60 TABLET | Refills: 3 | Status: SHIPPED | OUTPATIENT
Start: 2023-12-29 | End: 2024-04-27

## 2023-12-29 RX ORDER — HYDROXYZINE HYDROCHLORIDE 25 MG/1
25 TABLET, FILM COATED ORAL EVERY 6 HOURS PRN
Qty: 180 TABLET | Refills: 1 | Status: SHIPPED | OUTPATIENT
Start: 2023-12-29

## 2024-01-29 ENCOUNTER — VBI (OUTPATIENT)
Dept: ADMINISTRATIVE | Facility: OTHER | Age: 39
End: 2024-01-29

## 2024-04-04 DIAGNOSIS — F41.9 ANXIETY: ICD-10-CM

## 2024-04-04 RX ORDER — BUSPIRONE HYDROCHLORIDE 5 MG/1
5 TABLET ORAL 2 TIMES DAILY
Qty: 60 TABLET | Refills: 3 | Status: SHIPPED | OUTPATIENT
Start: 2024-04-04 | End: 2024-08-02

## 2024-04-04 RX ORDER — HYDROXYZINE HYDROCHLORIDE 25 MG/1
25 TABLET, FILM COATED ORAL EVERY 6 HOURS PRN
Qty: 180 TABLET | Refills: 1 | Status: SHIPPED | OUTPATIENT
Start: 2024-04-04

## 2024-04-04 NOTE — TELEPHONE ENCOUNTER
AGUSTÍN left on RX line:      Yes, my name is Corinne Banner CORRINBANNER. My YOB: 1985. The two prescriptions that I need filled is buspar 5 milligram tablets. It's a month supply and Hydroxyzine HCL 25 milligram tablets both sent to [facility] on [address] in [place], [place]. If you have any questions, feel free to call me back [phone]. Thank you.

## 2024-04-06 ENCOUNTER — APPOINTMENT (EMERGENCY)
Dept: RADIOLOGY | Facility: HOSPITAL | Age: 39
End: 2024-04-06
Payer: COMMERCIAL

## 2024-04-06 ENCOUNTER — HOSPITAL ENCOUNTER (EMERGENCY)
Facility: HOSPITAL | Age: 39
Discharge: HOME | End: 2024-04-06
Attending: EMERGENCY MEDICINE
Payer: COMMERCIAL

## 2024-04-06 VITALS
OXYGEN SATURATION: 97 % | RESPIRATION RATE: 22 BRPM | SYSTOLIC BLOOD PRESSURE: 110 MMHG | TEMPERATURE: 97.7 F | WEIGHT: 255.8 LBS | BODY MASS INDEX: 40.06 KG/M2 | DIASTOLIC BLOOD PRESSURE: 56 MMHG | HEART RATE: 50 BPM

## 2024-04-06 DIAGNOSIS — R45.89 ANXIETY ABOUT HEALTH: ICD-10-CM

## 2024-04-06 DIAGNOSIS — R07.9 CHEST PAIN: Primary | ICD-10-CM

## 2024-04-06 LAB
ALBUMIN SERPL BCP-MCNC: 4.1 G/DL (ref 3.5–5)
ALP SERPL-CCNC: 46 U/L (ref 34–104)
ALT SERPL W P-5'-P-CCNC: 16 U/L (ref 7–52)
ANION GAP SERPL CALCULATED.3IONS-SCNC: 8 MMOL/L (ref 4–13)
AST SERPL W P-5'-P-CCNC: 14 U/L (ref 13–39)
ATRIAL RATE: 73 BPM
BASOPHILS # BLD AUTO: 0.07 THOUSANDS/ÂΜL (ref 0–0.1)
BASOPHILS NFR BLD AUTO: 1 % (ref 0–1)
BILIRUB SERPL-MCNC: 0.31 MG/DL (ref 0.2–1)
BUN SERPL-MCNC: 14 MG/DL (ref 5–25)
CALCIUM SERPL-MCNC: 9.4 MG/DL (ref 8.4–10.2)
CARDIAC TROPONIN I PNL SERPL HS: 2 NG/L
CHLORIDE SERPL-SCNC: 107 MMOL/L (ref 96–108)
CO2 SERPL-SCNC: 22 MMOL/L (ref 21–32)
CREAT SERPL-MCNC: 0.84 MG/DL (ref 0.6–1.3)
EOSINOPHIL # BLD AUTO: 0.17 THOUSAND/ÂΜL (ref 0–0.61)
EOSINOPHIL NFR BLD AUTO: 2 % (ref 0–6)
ERYTHROCYTE [DISTWIDTH] IN BLOOD BY AUTOMATED COUNT: 13.2 % (ref 11.6–15.1)
FLUAV RNA RESP QL NAA+PROBE: NEGATIVE
FLUBV RNA RESP QL NAA+PROBE: NEGATIVE
GFR SERPL CREATININE-BSD FRML MDRD: 87 ML/MIN/1.73SQ M
GLUCOSE SERPL-MCNC: 99 MG/DL (ref 65–140)
HCT VFR BLD AUTO: 43.9 % (ref 34.8–46.1)
HGB BLD-MCNC: 14.7 G/DL (ref 11.5–15.4)
IMM GRANULOCYTES # BLD AUTO: 0.02 THOUSAND/UL (ref 0–0.2)
IMM GRANULOCYTES NFR BLD AUTO: 0 % (ref 0–2)
LYMPHOCYTES # BLD AUTO: 2.32 THOUSANDS/ÂΜL (ref 0.6–4.47)
LYMPHOCYTES NFR BLD AUTO: 29 % (ref 14–44)
MCH RBC QN AUTO: 31.3 PG (ref 26.8–34.3)
MCHC RBC AUTO-ENTMCNC: 33.5 G/DL (ref 31.4–37.4)
MCV RBC AUTO: 94 FL (ref 82–98)
MONOCYTES # BLD AUTO: 0.75 THOUSAND/ÂΜL (ref 0.17–1.22)
MONOCYTES NFR BLD AUTO: 9 % (ref 4–12)
NEUTROPHILS # BLD AUTO: 4.65 THOUSANDS/ÂΜL (ref 1.85–7.62)
NEUTS SEG NFR BLD AUTO: 59 % (ref 43–75)
NRBC BLD AUTO-RTO: 0 /100 WBCS
P AXIS: 75 DEGREES
PLATELET # BLD AUTO: 317 THOUSANDS/UL (ref 149–390)
PMV BLD AUTO: 9.9 FL (ref 8.9–12.7)
POTASSIUM SERPL-SCNC: 4.1 MMOL/L (ref 3.5–5.3)
PR INTERVAL: 142 MS
PROT SERPL-MCNC: 7 G/DL (ref 6.4–8.4)
QRS AXIS: 51 DEGREES
QRSD INTERVAL: 90 MS
QT INTERVAL: 382 MS
QTC INTERVAL: 420 MS
RBC # BLD AUTO: 4.69 MILLION/UL (ref 3.81–5.12)
RSV RNA RESP QL NAA+PROBE: NEGATIVE
SARS-COV-2 RNA RESP QL NAA+PROBE: NEGATIVE
SODIUM SERPL-SCNC: 137 MMOL/L (ref 135–147)
T WAVE AXIS: 55 DEGREES
TSH SERPL DL<=0.05 MIU/L-ACNC: 1.44 UIU/ML (ref 0.45–4.5)
VENTRICULAR RATE: 73 BPM
WBC # BLD AUTO: 7.98 THOUSAND/UL (ref 4.31–10.16)

## 2024-04-06 PROCEDURE — 93005 ELECTROCARDIOGRAM TRACING: CPT

## 2024-04-06 PROCEDURE — 84443 ASSAY THYROID STIM HORMONE: CPT | Performed by: EMERGENCY MEDICINE

## 2024-04-06 PROCEDURE — 80053 COMPREHEN METABOLIC PANEL: CPT | Performed by: EMERGENCY MEDICINE

## 2024-04-06 PROCEDURE — 99285 EMERGENCY DEPT VISIT HI MDM: CPT | Performed by: EMERGENCY MEDICINE

## 2024-04-06 PROCEDURE — 85025 COMPLETE CBC W/AUTO DIFF WBC: CPT | Performed by: EMERGENCY MEDICINE

## 2024-04-06 PROCEDURE — 84484 ASSAY OF TROPONIN QUANT: CPT | Performed by: EMERGENCY MEDICINE

## 2024-04-06 PROCEDURE — 0241U HB NFCT DS VIR RESP RNA 4 TRGT: CPT | Performed by: EMERGENCY MEDICINE

## 2024-04-06 PROCEDURE — 71046 X-RAY EXAM CHEST 2 VIEWS: CPT

## 2024-04-06 PROCEDURE — 36415 COLL VENOUS BLD VENIPUNCTURE: CPT | Performed by: EMERGENCY MEDICINE

## 2024-04-06 RX ORDER — KETOROLAC TROMETHAMINE 30 MG/ML
15 INJECTION, SOLUTION INTRAMUSCULAR; INTRAVENOUS ONCE
Status: COMPLETED | OUTPATIENT
Start: 2024-04-06 | End: 2024-04-06

## 2024-04-06 RX ORDER — LORAZEPAM 2 MG/ML
0.5 INJECTION INTRAMUSCULAR ONCE
Status: COMPLETED | OUTPATIENT
Start: 2024-04-06 | End: 2024-04-06

## 2024-04-06 RX ADMIN — KETOROLAC TROMETHAMINE 15 MG: 30 INJECTION, SOLUTION INTRAMUSCULAR; INTRAVENOUS at 12:24

## 2024-04-06 RX ADMIN — LORAZEPAM 0.5 MG: 2 INJECTION INTRAMUSCULAR; INTRAVENOUS at 12:26

## 2024-04-06 NOTE — ED PROVIDER NOTES
"History  Chief Complaint   Patient presents with    Chest Pain     Arrives crying. States started last night with chest and back pain. States she has had a cold for a month and she is concerned about pneumonia but that she also was found last month to have a \" enlarged heart vessel \" and has been too busy to make appointment with cardiologist. States she is taking a aspirin for this\" vessel \"as per the advice of her father. EKG done. States it hurts to move her arms and take a deep breath.      Pt is a 39yo F who presents for chest pain and shortness of breath.  Patient reports that started last night and has been worsening.  Patient reports it is left-sided chest pain.  Patient also reports bilateral upper back pain.  Patient also reports shortness of breath and states her pain is worsened with deep breathing.  Patient also reporting significant fatigue.  Patient reports she has never had anything like this before.  Patient states the last time she was in the hospital she got diagnosed with a \"enlarged artery of her heart\".  Patient states she never followed up outpatient and is now concerned that this is causing issues.  Per chart review, patient has an enlarged atria.  Patient reports that she is extremely nervous about her symptoms and what they could mean.  Patient denies any history of blood clots, recent hospitalization, recent travel, or unilateral leg swelling.        Prior to Admission Medications   Prescriptions Last Dose Informant Patient Reported? Taking?   albuterol (Ventolin HFA) 90 mcg/act inhaler   No No   Sig: Inhale 2 puffs every 6 (six) hours as needed for wheezing   Patient not taking: Reported on 5/1/2023   busPIRone (BUSPAR) 5 mg tablet   No No   Sig: Take 1 tablet (5 mg total) by mouth 2 (two) times a day   hydrOXYzine HCL (ATARAX) 25 mg tablet   No No   Sig: Take 1 tablet (25 mg total) by mouth every 6 (six) hours as needed for anxiety   lidocaine (Lidoderm) 5 %   No No   Sig: Apply 1 patch " topically over 12 hours daily Remove & Discard patch within 12 hours or as directed by MD   naproxen (Naprosyn) 500 mg tablet   No No   Sig: Take 1 tablet (500 mg total) by mouth 2 (two) times a day with meals   ofloxacin (OCUFLOX) 0.3 % ophthalmic solution   No No   Sig: Administer 1 drop to the right eye 4 (four) times a day   polyethylene glycol (MIRALAX) 17 g packet   No No   Sig: Take 17 g by mouth daily as needed (Constipation)      Facility-Administered Medications: None       Past Medical History:   Diagnosis Date    Asthma     COPD (chronic obstructive pulmonary disease) (formerly [facility])     Effusion of left knee 7/3/2018    Gastrocnemius tear, left, subsequent encounter 7/3/2018    Left buttock abscess 3/1/2023    Macular degeneration     right eye    Palpitation 3/10/2023    Periodontitis     Psychiatric disorder     depression, anxiety    Sepsis (formerly [facility]) 3/1/2023       Past Surgical History:   Procedure Laterality Date     SECTION      AK I&D ISCHIORECTAL&/PERIRECTAL ABSCESS SPX N/A 3/1/2023    Procedure: INCISION AND DRAINAGE (I&D) PERIRECTAL ABSCESS;  Surgeon: José Miguel Grimm MD;  Location[facility];  Service: General    TONSILECTOMY AND ADNOIDECTOMY      TUBAL LIGATION         Family History   Problem Relation Age of Onset    Hypertension Mother     Hyperlipidemia Mother     Arthritis Mother     Diabetes Mother     Asthma Mother     Thyroid disease Mother     Diabetes Father     Hypertension Father     Thyroid disease Sister     Diabetes Sister     Thyroid disease Daughter     Hyperlipidemia Daughter     Asthma Daughter      I have reviewed and agree with the history as documented.    E-Cigarette/Vaping    E-Cigarette Use Never User      E-Cigarette/Vaping Substances    Nicotine No     THC No     CBD No     Flavoring No     Other No     Unknown No      Social History     Tobacco Use    Smoking status: Former     Current packs/day: 0.50     Average packs/day: 0.5 packs/day for 25.0 years (12.5 ttl  pk-yrs)     Types: Cigarettes    Smokeless tobacco: Never   Vaping Use    Vaping status: Never Used   Substance Use Topics    Alcohol use: No     Comment: socially /  never per [employer]    Drug use: No       Review of Systems   Constitutional:  Positive for fatigue.   Respiratory:  Positive for cough and shortness of breath.    Cardiovascular:  Positive for chest pain.   Musculoskeletal:  Positive for back pain.   Psychiatric/Behavioral:  The patient is nervous/anxious.    All other systems reviewed and are negative.      Physical Exam  Physical Exam  Vitals reviewed.   Constitutional:       General: She is not in acute distress.     Appearance: She is well-developed. She is not toxic-appearing or diaphoretic.   HENT:      Head: Normocephalic and atraumatic.      Right Ear: External ear normal.      Left Ear: External ear normal.      Nose: Nose normal.      Mouth/Throat:      Pharynx: Oropharynx is clear.   Eyes:      Extraocular Movements: Extraocular movements intact.      Pupils: Pupils are equal, round, and reactive to light.   Cardiovascular:      Rate and Rhythm: Normal rate and regular rhythm.      Heart sounds: Normal heart sounds.   Pulmonary:      Effort: Pulmonary effort is normal. No respiratory distress.      Breath sounds: Normal breath sounds.   Abdominal:      General: There is no distension.      Palpations: Abdomen is soft.      Tenderness: There is no abdominal tenderness.   Musculoskeletal:         General: Normal range of motion.      Cervical back: Normal range of motion and neck supple.   Skin:     General: Skin is warm and dry.      Capillary Refill: Capillary refill takes less than 2 seconds.      Coloration: Skin is not pale.      Findings: No erythema or rash.   Neurological:      Mental Status: She is alert and oriented to person, place, and time.   Psychiatric:         Mood and Affect: Mood is anxious. Affect is tearful.         Speech: Speech normal.         Behavior: Behavior is  cooperative.         Vital Signs  ED Triage Vitals [04/06/24 1152]   Temperature Pulse Respirations Blood Pressure SpO2   98.7 °F (37.1 °C) 75 (!) 28 163/83 100 %      Temp Source Heart Rate Source Patient Position - Orthostatic VS BP Location FiO2 (%)   Tympanic Monitor Sitting Left arm --      Pain Score       10 - Worst Possible Pain           Vitals:    04/06/24 1152 04/06/24 1419   BP: 163/83 110/56   Pulse: 75 (!) 50   Patient Position - Orthostatic VS: Sitting Lying         Visual Acuity      ED Medications  Medications   ketorolac (TORADOL) injection 15 mg (15 mg Intravenous Given 4/6/24 1224)   LORazepam (ATIVAN) injection 0.5 mg (0.5 mg Intravenous Given 4/6/24 1226)       Diagnostic Studies  Results Reviewed       Procedure Component Value Units Date/Time    FLU/RSV/COVID - if FLU/RSV clinically relevant [[id number]]  (Normal) Collected: 04/06/24 1215    Lab Status: Final result Specimen: Nares from Nose Updated: 04/06/24 1306     SARS-CoV-2 Negative     INFLUENZA A PCR Negative     INFLUENZA B PCR Negative     RSV PCR Negative    Narrative:      FOR PEDIATRIC PATIENTS - copy/paste COVID Guidelines URL to browser: [email or web address]    SARS-CoV-2 assay is a Nucleic Acid Amplification assay intended for the  qualitative detection of nucleic acid from SARS-CoV-2 in nasopharyngeal  swabs. Results are for the presumptive identification of SARS-CoV-2 RNA.    Positive results are indicative of infection with SARS-CoV-2, the virus  causing COVID-19, but do not rule out bacterial infection or co-infection  with other viruses. Laboratories within the [place] and its  territories are required to report all positive results to the appropriate  public health authorities. Negative results do not preclude SARS-CoV-2  infection and should not be used as the sole basis for treatment or other  patient management decisions. Negative results must be combined  with  clinical observations, patient history, and epidemiological information.  This test has not been FDA cleared or approved.    This test has been authorized by FDA under an Emergency Use Authorization  (EUA). This test is only authorized for the duration of time the  declaration that circumstances exist justifying the authorization of the  emergency use of an in vitro diagnostic tests for detection of SARS-CoV-2  virus and/or diagnosis of COVID-19 infection under section 564(b)(1) of  the Act, 21 U.S.C. 360bbb-3(b)(1), unless the authorization is terminated  or revoked sooner. The test has been validated but independent review by FDA  and CLIA is pending.    Test performed using [employer] GeneXpert: This RT-PCR assay targets N2,  a region unique to SARS-CoV-2. A conserved region in the E-gene was chosen  for pan-Sarbecovirus detection which includes SARS-CoV-2.    According to CMS-2020-01-R, this platform meets the definition of high-throughput technology.    TSH, 3rd generation with Free T4 reflex [[id number]]  (Normal) Collected: 04/06/24 1215    Lab Status: Final result Specimen: Blood from Arm, Right Updated: 04/06/24 1303     TSH 3RD GENERATON 1.440 uIU/mL     HS Troponin 0hr (reflex protocol) [[id number]]  (Normal) Collected: 04/06/24 1215    Lab Status: Final result Specimen: Blood from Arm, Right Updated: 04/06/24 1254     hs TnI 0hr 2 ng/L     Comprehensive metabolic panel [[id number]] Collected: 04/06/24 1215    Lab Status: Final result Specimen: Blood from Arm, Right Updated: 04/06/24 1248     Sodium 137 mmol/L      Potassium 4.1 mmol/L      Chloride 107 mmol/L      CO2 22 mmol/L      ANION GAP 8 mmol/L      BUN 14 mg/dL      Creatinine 0.84 mg/dL      Glucose 99 mg/dL      Calcium 9.4 mg/dL      AST 14 U/L      ALT 16 U/L      Alkaline Phosphatase 46 U/L      Total Protein 7.0 g/dL      Albumin 4.1 g/dL      Total Bilirubin 0.31 mg/dL      eGFR 87 ml/min/1.73sq m     Narrative:      National Kidney  Disease Foundation guidelines for Chronic Kidney Disease (CKD):     Stage 1 with normal or high GFR (GFR > 90 mL/min/1.73 square meters)    Stage 2 Mild CKD (GFR = 60-89 mL/min/1.73 square meters)    Stage 3A Moderate CKD (GFR = 45-59 mL/min/1.73 square meters)    Stage 3B Moderate CKD (GFR = 30-44 mL/min/1.73 square meters)    Stage 4 Severe CKD (GFR = 15-29 mL/min/1.73 square meters)    Stage 5 End Stage CKD (GFR <15 mL/min/1.73 square meters)  Note: GFR calculation is accurate only with a steady state creatinine    CBC and differential [[id number]] Collected: 04/06/24 1215    Lab Status: Final result Specimen: Blood from Arm, Right Updated: 04/06/24 1227     WBC 7.98 Thousand/uL      RBC 4.69 Million/uL      Hemoglobin 14.7 g/dL      Hematocrit 43.9 %      MCV 94 fL      MCH 31.3 pg      MCHC 33.5 g/dL      RDW 13.2 %      MPV 9.9 fL      Platelets 317 Thousands/uL      nRBC 0 /100 WBCs      Neutrophils Relative 59 %      Immature Grans % 0 %      Lymphocytes Relative 29 %      Monocytes Relative 9 %      Eosinophils Relative 2 %      Basophils Relative 1 %      Neutrophils Absolute 4.65 Thousands/µL      Absolute Immature Grans 0.02 Thousand/uL      Absolute Lymphocytes 2.32 Thousands/µL      Absolute Monocytes 0.75 Thousand/µL      Eosinophils Absolute 0.17 Thousand/µL      Basophils Absolute 0.07 Thousands/µL                    XR chest 2 views   Final Result by Slade Moore MD (04/06 1505)      No acute cardiopulmonary disease.            Workstation performed: [id number]                    Procedures  Procedures         ED Course  ED Course as of 04/06/24 1609   Sat Apr 06, 2024   1153 ECG 12 lead  Procedure Note: EKG  Date/Time: 04/06/24 11:53 AM   Interpreted by: Karol Ron MD  Indications / Diagnosis: CP  ECG reviewed by me, the ED Physician: yes   The EKG demonstrates:  Rhythm: normal sinus  Intervals: normal intervals  Axis: normal axis  QRS/Blocks: normal QRS  ST Changes: No acute ST Changes,  no STD/YAN.   1227 CBC and differential  Reviewed and without actionable derangement.    1249 Comprehensive metabolic panel  Reviewed and without actionable derangement.    1254 hs TnI 0hr: 2  WNL. No indication for delta based on result or timing.    1303 TSH 3RD GENERATON: 1.440  WNL   1307 FLU/RSV/COVID - if FLU/RSV clinically relevant  Negative.    1506 Respirations: 22  Interval improvement.    1506 Blood Pressure: 110/56  Interval improvement.    1508 XR chest 2 views  No acute cardiopulmonary disease.              HEART Risk Score      Flowsheet Row Most Recent Value   Heart Score Risk Calculator    History 0 Filed at: 04/06/2024 1509   ECG 1 Filed at: 04/06/2024 1509   Age 0 Filed at: 04/06/2024 1509   Risk Factors 1 Filed at: 04/06/2024 1509   Troponin 0 Filed at: 04/06/2024 1509   HEART Score 2 Filed at: 04/06/2024 1509                  PERC Rule for PE      Flowsheet Row Most Recent Value   PERC Rule for PE    Age >=50 0 Filed at: 04/06/2024 1203   HR >=100 0 Filed at: 04/06/2024 1203   O2 Sat on room air < 95% 0 Filed at: 04/06/2024 1203   History of PE or DVT 0 Filed at: 04/06/2024 1203   Recent trauma or surgery 0 Filed at: 04/06/2024 1203   Hemoptysis 0 Filed at: 04/06/2024 1203   Exogenous estrogen 0 Filed at: 04/06/2024 1203   Unilateral leg swelling 0 Filed at: 04/06/2024 1203   PERC Rule for PE Results 0 Filed at: 04/06/2024 1203                SBIRT 20yo+      Flowsheet Row Most Recent Value   Initial Alcohol Screen: US AUDIT-C     1. How often do you have a drink containing alcohol? 0 Filed at: 04/06/2024 1156   Audit-C Score 0 Filed at: 04/06/2024 1156   SAMANTHA: How many times in the past year have you...    Used an illegal drug or used a prescription medication for non-medical reasons? Never Filed at: 04/06/2024 1156            Wells' Criteria for PE      Flowsheet Row Most Recent Value   Wells' Criteria for PE    Clinical signs and symptoms of DVT 0 Filed at: 04/06/2024 1203   PE is primary  diagnosis or equally likely 0 Filed at: 04/06/2024 1203   HR >100 0 Filed at: 04/06/2024 1203   Immobilization at least 3 days or Surgery in the previous 4 weeks 0 Filed at: 04/06/2024 1203   Previous, objectively diagnosed PE or DVT 0 Filed at: 04/06/2024 1203   Hemoptysis 0 Filed at: 04/06/2024 1203   Malignancy with treatment within 6 months or palliative 0 Filed at: 04/06/2024 1203   Wells' Criteria Total 0 Filed at: 04/06/2024 1203                  Medical Decision Making  Pt is a 37yo F who presents with chest pain.     Differential diagnosis to include but not limited to ACS, myocarditis, pericarditis, costochondritis, PE, pneumonia, pneumothorax.  Patient is Wells low and PERC's out.  No further workup for PE indicated.  Will obtain cardiac workup including troponin, EKG, and chest x-ray.  Will treat symptomatically and reassess.  See ED course for results and details.    Plan to discharge pt with f/u to PCP. Discussed returning the ED with new or worsening of symptoms. Discussed use of over the counter medications as stated on the bottle as needed for pain. Pt expressed understanding of discharge instructions, return precautions, and medication instructions and is stable for discharge at this time. All questions were answered and pt was discharged without incident.      Amount and/or Complexity of Data Reviewed  Labs: ordered. Decision-making details documented in ED Course.  Radiology: ordered. Decision-making details documented in ED Course.  ECG/medicine tests:  Decision-making details documented in ED Course.    Risk  Prescription drug management.             Disposition  Final diagnoses:   Chest pain   Anxiety about health     Time reflects when diagnosis was documented in both MDM as applicable and the Disposition within this note       Time User Action Codes Description Comment    4/6/2024  3:09 PM Karol Ron Add [R07.9] Chest pain     4/6/2024  3:09 PM Karol Ron Add [R45.89] Anxiety  about health           ED Disposition       ED Disposition   Discharge    Condition   Stable    Date/Time   Sat Apr 6, 2024 1504    Comment   Wilma Weiss discharge to home/self care.                   Follow-up Information       Follow up With Specialties Details Why Contact Info    [facility]  Call  As needed [phone]              Discharge Medication List as of 4/6/2024  3:10 PM        CONTINUE these medications which have NOT CHANGED    Details   albuterol (Ventolin HFA) 90 mcg/act inhaler Inhale 2 puffs every 6 (six) hours as needed for wheezing, Starting Fri 3/10/2023, Normal      busPIRone (BUSPAR) 5 mg tablet Take 1 tablet (5 mg total) by mouth 2 (two) times a day, Starting Thu 4/4/2024, Until Fri 8/2/2024, Normal      hydrOXYzine HCL (ATARAX) 25 mg tablet Take 1 tablet (25 mg total) by mouth every 6 (six) hours as needed for anxiety, Starting Thu 4/4/2024, Normal      lidocaine (Lidoderm) 5 % Apply 1 patch topically over 12 hours daily Remove & Discard patch within 12 hours or as directed by MD, Starting Thu 8/31/2023, Normal      naproxen (Naprosyn) 500 mg tablet Take 1 tablet (500 mg total) by mouth 2 (two) times a day with meals, Starting Thu 8/31/2023, Normal      ofloxacin (OCUFLOX) 0.3 % ophthalmic solution Administer 1 drop to the right eye 4 (four) times a day, Starting Mon 5/1/2023, Normal      polyethylene glycol (MIRALAX) 17 g packet Take 17 g by mouth daily as needed (Constipation), Starting Sat 3/4/2023, Normal             No discharge procedures on file.    PDMP Review       None            ED Provider  Electronically Signed by             Karol Ron MD  04/06/24 [id number]

## 2024-04-06 NOTE — DISCHARGE INSTRUCTIONS
Follow-up with primary care for further care. Contact info provided below if needed.  Use over the counter medications as stated on the bottle as needed for pain control.  Return to the ED with new or worsening symptoms.

## 2024-04-08 LAB
ATRIAL RATE: 73 BPM
P AXIS: 75 DEGREES
PR INTERVAL: 142 MS
QRS AXIS: 51 DEGREES
QRSD INTERVAL: 90 MS
QT INTERVAL: 382 MS
QTC INTERVAL: 420 MS
T WAVE AXIS: 55 DEGREES
VENTRICULAR RATE: 73 BPM

## 2024-04-08 PROCEDURE — 93010 ELECTROCARDIOGRAM REPORT: CPT | Performed by: INTERNAL MEDICINE

## 2024-07-03 DIAGNOSIS — F41.9 ANXIETY: ICD-10-CM

## 2024-07-04 RX ORDER — HYDROXYZINE HYDROCHLORIDE 25 MG/1
25 TABLET, FILM COATED ORAL EVERY 6 HOURS PRN
Qty: 180 TABLET | Refills: 1 | Status: SHIPPED | OUTPATIENT
Start: 2024-07-04

## 2024-07-10 DIAGNOSIS — F41.9 ANXIETY: ICD-10-CM

## 2024-07-10 RX ORDER — BUSPIRONE HYDROCHLORIDE 5 MG/1
5 TABLET ORAL 2 TIMES DAILY
Qty: 60 TABLET | Refills: 3 | Status: SHIPPED | OUTPATIENT
Start: 2024-07-10 | End: 2024-11-07

## 2024-10-18 DIAGNOSIS — F41.9 ANXIETY: ICD-10-CM

## 2024-10-18 RX ORDER — BUSPIRONE HYDROCHLORIDE 5 MG/1
5 TABLET ORAL 2 TIMES DAILY
Qty: 60 TABLET | Refills: 3 | Status: SHIPPED | OUTPATIENT
Start: 2024-10-18 | End: 2025-02-15

## 2024-10-18 NOTE — TELEPHONE ENCOUNTER
Fax received from Pharmacy requesting:      Buspirone HCL 5mg Tab    [facility] [place]

## 2024-11-05 DIAGNOSIS — F41.9 ANXIETY: ICD-10-CM

## 2024-11-05 RX ORDER — HYDROXYZINE HYDROCHLORIDE 25 MG/1
25 TABLET, FILM COATED ORAL EVERY 6 HOURS PRN
Qty: 30 TABLET | Refills: 0 | Status: SHIPPED | OUTPATIENT
Start: 2024-11-05

## 2024-11-05 RX ORDER — BUSPIRONE HYDROCHLORIDE 5 MG/1
5 TABLET ORAL 2 TIMES DAILY
Qty: 60 TABLET | Refills: 0 | Status: SHIPPED | OUTPATIENT
Start: 2024-11-05 | End: 2024-12-05

## 2024-11-22 ENCOUNTER — RA CDI HCC (OUTPATIENT)
Dept: OTHER | Facility: HOSPITAL | Age: 39
End: 2024-11-22

## 2024-12-02 ENCOUNTER — OFFICE VISIT (OUTPATIENT)
Age: 39
End: 2024-12-02

## 2024-12-02 VITALS
HEIGHT: 67 IN | BODY MASS INDEX: 39.72 KG/M2 | OXYGEN SATURATION: 97 % | WEIGHT: 253.1 LBS | DIASTOLIC BLOOD PRESSURE: 78 MMHG | TEMPERATURE: 97.8 F | HEART RATE: 78 BPM | SYSTOLIC BLOOD PRESSURE: 112 MMHG

## 2024-12-02 DIAGNOSIS — F41.9 SEVERE ANXIETY: ICD-10-CM

## 2024-12-02 DIAGNOSIS — R45.851 SUICIDAL IDEATIONS: ICD-10-CM

## 2024-12-02 DIAGNOSIS — J42 CHRONIC BRONCHITIS, UNSPECIFIED CHRONIC BRONCHITIS TYPE (HCC): ICD-10-CM

## 2024-12-02 DIAGNOSIS — Z00.00 ENCOUNTER FOR ANNUAL WELLNESS VISIT (AWV) IN MEDICARE PATIENT: Primary | ICD-10-CM

## 2024-12-02 DIAGNOSIS — Z12.31 ENCOUNTER FOR SCREENING MAMMOGRAM FOR BREAST CANCER: ICD-10-CM

## 2024-12-02 DIAGNOSIS — F31.9 BIPOLAR 1 DISORDER (HCC): ICD-10-CM

## 2024-12-02 DIAGNOSIS — Z23 ENCOUNTER FOR IMMUNIZATION: ICD-10-CM

## 2024-12-02 DIAGNOSIS — F33.2 SEVERE EPISODE OF RECURRENT MAJOR DEPRESSIVE DISORDER, WITHOUT PSYCHOTIC FEATURES (HCC): ICD-10-CM

## 2024-12-02 PROCEDURE — 90746 HEPB VACCINE 3 DOSE ADULT IM: CPT | Performed by: FAMILY MEDICINE

## 2024-12-02 PROCEDURE — G0439 PPPS, SUBSEQ VISIT: HCPCS | Performed by: FAMILY MEDICINE

## 2024-12-02 PROCEDURE — G0008 ADMIN INFLUENZA VIRUS VAC: HCPCS | Performed by: FAMILY MEDICINE

## 2024-12-02 PROCEDURE — 90673 RIV3 VACCINE NO PRESERV IM: CPT | Performed by: FAMILY MEDICINE

## 2024-12-02 PROCEDURE — G0010 ADMIN HEPATITIS B VACCINE: HCPCS | Performed by: FAMILY MEDICINE

## 2024-12-02 RX ORDER — FLUTICASONE PROPIONATE AND SALMETEROL 100; 50 UG/1; UG/1
1 POWDER RESPIRATORY (INHALATION) 2 TIMES DAILY
Qty: 60 BLISTER | Refills: 2 | Status: SHIPPED | OUTPATIENT
Start: 2024-12-02

## 2024-12-02 RX ORDER — FLUOXETINE 10 MG/1
10 CAPSULE ORAL DAILY
Qty: 30 CAPSULE | Refills: 1 | Status: SHIPPED | OUTPATIENT
Start: 2024-12-02
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
## 2024-12-02 NOTE — ASSESSMENT & PLAN NOTE
Patient has not been on any maintenance inhalers/nebulizers due to insurance  Patient placed on Advair 1 puff twice daily  Orders:    Fluticasone-Salmeterol (Advair Diskus) 100-50 mcg/dose inhaler; Inhale 1 puff 2 (two) times a day Rinse mouth after use.

## 2024-12-03 ENCOUNTER — TELEPHONE (OUTPATIENT)
Age: 39
End: 2024-12-03

## 2024-12-03 NOTE — TELEPHONE ENCOUNTER
"Behavioral Health Outpatient Intake Questions    Referred By   : PCP       Please advise interviewee that they need to answer all questions truthfully to allow for best care, and any misrepresentations of information may affect their ability to be seen at this clinic   => Was this discussed? Yes     If Minor Child (under age 18)    Who is/are the legal guardian(s) of the child?     Is there a custody agreement?      If \"YES\"- Custody orders must be obtained prior to scheduling the first appointment  In addition, Consent to Treatment must be signed by all legal guardians prior to scheduling the first appointment    If \"NO\"- Consent to Treatment must be signed by all legal guardians prior to scheduling the first appointment    Behavioral Health Outpatient Intake History -     Presenting Problem (in patient's own words): SI, DEPRESSION, ANXIETY, Bipolar gotten worse    Are there any communication barriers for this patient?     No                                               If yes, please describe barriers:   If there is a unique situation, please refer to Shant Prescott/Yesika Clement for final determination.    Are you taking any psychiatric medications? Yes     If \"YES\" -What are they Prozac, Buspar, Atarax     If \"YES\" -Who prescribes?   PCP     Has the Patient previously received outpatient Talk Therapy or Medication Management from [facility]  No        If \"YES\"- When, Where and with Whom?         If \"NO\" -Has Patient received these services elsewhere?       If \"YES\" -When, Where, and with Whom?    Has the Patient abused alcohol or other substances in the last 6 months ? No  No concerns of substance abuse are reported.     If \"YES\" -What substance, How much, How often?     If illegal substance: Refer to [facility] (for MARÍA) or [facility] Offices.   If Alcohol in excess of 10 drinks per week:  Refer to [facility] (for MAR[facility]A) or [facility] Offices    Legal History-     Is this treatment court ordered? No   If \"yes " "\"send to :  Talk Therapy : Send to Shant Prescott for final determination   Med Management: Send to Dr. Schmid for final determination     Has the Patient been convicted of a felony?  No   If \"Yes\" send to -When, What?  Talk Therapy: Send to Shant Prescott for final determination   Med Management: Send to Dr. Schmid for final determination     ACCEPTED as a patient Yes  If \"Yes\" Appointment Date:    Dr. Napoles 1/22 @ 10a    Jared Arriaza 3/7 @ 11a    Referred Elsewhere? No  If “Yes” - (Where? Ex: [facility], [facility], [facility], [facility], etc.)       Name of Insurance Co:[facility]  Insurance ID#[id number]   Insurance Phone #  If ins is primary or secondary?  If patient is a minor, parents information such as Name, D.O.B of guarantor.  "

## 2024-12-03 NOTE — TELEPHONE ENCOUNTER
Contacted patient in regards to STAT Referral in attempts to verify patient's needs of services and offer appointment for preferred services. spoke with patient whom stated they are interested in services.     MM and TT

## 2024-12-09 ENCOUNTER — TELEPHONE (OUTPATIENT)
Dept: PSYCHIATRY | Facility: CLINIC | Age: 39
End: 2024-12-09

## 2024-12-09 NOTE — TELEPHONE ENCOUNTER
Forms sent via [employer].    M requesting that forms be completed via [employer] prior to appt.

## 2024-12-17 ENCOUNTER — ANNUAL EXAM (OUTPATIENT)
Age: 39
End: 2024-12-17

## 2024-12-17 VITALS
SYSTOLIC BLOOD PRESSURE: 122 MMHG | HEART RATE: 78 BPM | RESPIRATION RATE: 18 BRPM | OXYGEN SATURATION: 99 % | TEMPERATURE: 99 F | DIASTOLIC BLOOD PRESSURE: 76 MMHG | BODY MASS INDEX: 39.16 KG/M2 | WEIGHT: 250 LBS

## 2024-12-17 DIAGNOSIS — Z12.4 SCREENING FOR CERVICAL CANCER: ICD-10-CM

## 2024-12-17 DIAGNOSIS — Z01.419 WOMEN'S ANNUAL ROUTINE GYNECOLOGICAL EXAMINATION: Primary | ICD-10-CM

## 2024-12-17 DIAGNOSIS — R30.0 DYSURIA: ICD-10-CM

## 2024-12-17 LAB
SL AMB  POCT GLUCOSE, UA: NEGATIVE
SL AMB LEUKOCYTE ESTERASE,UA: NEGATIVE
SL AMB POCT BILIRUBIN,UA: NEGATIVE
SL AMB POCT BLOOD,UA: NORMAL
SL AMB POCT CLARITY,UA: CLEAR
SL AMB POCT COLOR,UA: NORMAL
SL AMB POCT KETONES,UA: NEGATIVE
SL AMB POCT NITRITE,UA: NEGATIVE
SL AMB POCT PH,UA: 6
SL AMB POCT SPECIFIC GRAVITY,UA: 1.01
SL AMB POCT URINE PROTEIN: NEGATIVE
SL AMB POCT UROBILINOGEN: 0.2

## 2024-12-17 PROCEDURE — 81003 URINALYSIS AUTO W/O SCOPE: CPT | Performed by: FAMILY MEDICINE

## 2024-12-17 PROCEDURE — 99396 PREV VISIT EST AGE 40-64: CPT | Performed by: FAMILY MEDICINE

## 2024-12-17 PROCEDURE — G0476 HPV COMBO ASSAY CA SCREEN: HCPCS

## 2024-12-17 PROCEDURE — G0143 SCR C/V CYTO,THINLAYER,RESCR: HCPCS

## 2024-12-18 LAB
HPV HR 12 DNA CVX QL NAA+PROBE: NEGATIVE
HPV16 DNA CVX QL NAA+PROBE: NEGATIVE
HPV18 DNA CVX QL NAA+PROBE: NEGATIVE

## 2024-12-20 LAB
LAB AP GYN PRIMARY INTERPRETATION: NORMAL
Lab: NORMAL
PATH INTERP SPEC-IMP: NORMAL

## 2024-12-22 ENCOUNTER — RESULTS FOLLOW-UP (OUTPATIENT)
Dept: OTHER | Facility: HOSPITAL | Age: 39
End: 2024-12-22

## 2024-12-22 DIAGNOSIS — B96.89 BACTERIAL VAGINOSIS: Primary | ICD-10-CM

## 2024-12-22 DIAGNOSIS — N76.0 BACTERIAL VAGINOSIS: Primary | ICD-10-CM

## 2024-12-23 RX ORDER — METRONIDAZOLE 500 MG/1
500 TABLET ORAL EVERY 8 HOURS SCHEDULED
Qty: 21 TABLET | Refills: 0 | Status: SHIPPED | OUTPATIENT
Start: 2024-12-23 | End: 2024-12-30

## 2024-12-23 NOTE — TELEPHONE ENCOUNTER
Message from patient NURSE NOTES:

Report received from LITA Lopez. Pt is in bed, awake, alert and oriented x 1, non verbal, no 
SOB, bed in lowest position with breaks engaged and alarm on, no s/sx of pain or discomfort 
at this time, IV line on left wrist in place, on 02 at 10 lpm via face mask, NPO at this 
time d/t episode of emesis, no episode of hypo/hyperglycemia at this time, will continue to 
monitor and proceed with plan of care, call light within reach.

## 2024-12-30 DIAGNOSIS — F33.2 SEVERE EPISODE OF RECURRENT MAJOR DEPRESSIVE DISORDER, WITHOUT PSYCHOTIC FEATURES (HCC): ICD-10-CM

## 2024-12-30 RX ORDER — FLUOXETINE 10 MG/1
10 CAPSULE ORAL DAILY
Qty: 30 CAPSULE | Refills: 1 | Status: SHIPPED | OUTPATIENT
Start: 2024-12-30

## 2025-01-21 ENCOUNTER — TELEPHONE (OUTPATIENT)
Dept: PSYCHIATRY | Facility: CLINIC | Age: 40
End: 2025-01-21

## 2025-01-21 NOTE — TELEPHONE ENCOUNTER
Left VMM for PT for BH forms sent through my chart. Told PT where to get forms on her my chart. Left a CB number. LVS

## 2025-01-22 ENCOUNTER — OFFICE VISIT (OUTPATIENT)
Dept: PSYCHIATRY | Facility: CLINIC | Age: 40
End: 2025-01-22
Payer: MEDICARE

## 2025-01-22 DIAGNOSIS — F41.1 GENERALIZED ANXIETY DISORDER: ICD-10-CM

## 2025-01-22 DIAGNOSIS — F43.10 POST TRAUMATIC STRESS DISORDER (PTSD): ICD-10-CM

## 2025-01-22 DIAGNOSIS — F31.9 BIPOLAR 1 DISORDER (HCC): Primary | ICD-10-CM

## 2025-01-22 PROCEDURE — 90792 PSYCH DIAG EVAL W/MED SRVCS: CPT | Performed by: STUDENT IN AN ORGANIZED HEALTH CARE EDUCATION/TRAINING PROGRAM

## 2025-01-22 RX ORDER — OLANZAPINE 5 MG/1
5 TABLET ORAL
Qty: 30 TABLET | Refills: 1 | Status: SHIPPED | OUTPATIENT
Start: 2025-01-22

## 2025-01-22 NOTE — BH CRISIS PLAN
Client Name: Wilma Weiss       Client YOB: 1985    [facility]      Creation Date: 1/22/25 Update Date: 1/22/25   Created By: Haider Napoles MD Last Updated By: Haider Napoles MD      Step 1: Warning Signs:   Warning Signs   Mind racing   Neglecting personal care   Neglecting responsibilities   Stuttering   Suicidal thoughts            Step 2: Internal Coping Strategies:   Internal Coping Strategies   Listening to music   Art   Going for a walk            Step 3: People and social settings that provide distraction:   Name Contact Information   Alka (sister) Phone            Step 4: People whom I can ask for help during a crisis:      Name Contact Information    Alka (sister) Phone      Step 5: Professionals or agencies I can contact during a crisis:      Clinican/Agency Name Phone Emergency Contact    [facility] [phone]       [facility] Emergency Department Emergency Department Phone Emergency Department Address    [facility] [phone]         Crisis Phone Numbers:   Suicide Prevention Lifeline: Call or Text  388 Crisis Text Line: Text HOME to 618-545   Please note: Some [facility] do not have a separate number for Child/Adolescent specific crisis. If your county is not listed under Child/Adolescent, please call the adult number for your county      Adult Crisis Numbers: Child/Adolescent Crisis Numbers   [facility]: [phone] [facility]: [phone]   [facility]: [phone] [facility]: [phone]   [facility]: [phone] [place]: [phone]   [facility]: [phone] [place]: [phone]   [place]: [phone]   [facility]: [phone]   [facility]: [phone]   [facility]: [phone] (daytime) [phone] (after hours, weekends, holidays)      Step 6: Making the environment safer (plan for lethal means safety):   Plan:  Guns in house owned by parents, locked with no access.     Optional: What is most important to me and worth living for?   Daughters     [place] Safety Plan. Zandra Corrigan and Placido Levy. Used with permission of the authors.

## 2025-01-22 NOTE — ASSESSMENT & PLAN NOTE
Orders:    Ambulatory referral to Psych Services    OLANZapine (ZyPREXA) 5 mg tablet; Take 1 tablet (5 mg total) by mouth daily at bedtime

## 2025-01-22 NOTE — ASSESSMENT & PLAN NOTE
The patient does meet criteria for generalized anxiety disorder.  Her symptoms have been severe and some of her irritability may be related to episodes of anxiety at times.  Patient has had some noticed benefit from being on Prozac.    See plan for bipolar disorder.
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
## 2025-01-22 NOTE — ASSESSMENT & PLAN NOTE
The patient meets criteria for PTSD.  She has had severe traumatic experiences with reexperiencing of this through nightmares and elements of hypervigilance and hyperarousal.  Symptoms remain poorly controlled.  She would benefit from individual therapy for this in addition to her current medication regimen.    See plan for bipolar disorder.

## 2025-01-22 NOTE — BH TREATMENT PLAN
TREATMENT PLAN (Medication Management Only)        [facility]    Name and Date of Birth:  Wilma Weiss 39 y.o. 1985  Date of Treatment Plan: January 22, 2025  Diagnosis/Diagnoses:    1. Bipolar 1 disorder (HCC)    2. Generalized anxiety disorder    3. Post traumatic stress disorder (PTSD)      Strengths/Personal Resources for Self-Care: supportive family, taking medications as prescribed, ability to adapt to life changes, ability to communicate well, ability to listen, ability to reason, ability to understand psychiatric illness, independence, motivation for treatment, being resoureceful, stable employment, willingness to work on problems.  Area/Areas of need (in own words): depressive symptoms, mood instability, sleep problems  1. Long Term Goal: improve control of mood stability.  Target Date:6 months - 7/22/2025  Person/Persons responsible for completion of goal: Wilma  2.  Short Term Objective (s) - How will we reach this goal?:   A. Provider new recommended medication/dosage changes and/or continue medication(s):  Will start Zyprexa to work in combination with Prozac for management of bipolar depression .  B.  Patient is pending starting individual therapy .  C.  Will work on lifestyle modifications to help with stressors .  Target Date:6 months - 7/22/2025  Person/Persons Responsible for Completion of Goal: Wilma  Progress Towards Goals: starting treatment  Treatment Modality: medication management every 4 weeks  Review due 180 days from date of this plan: 6 months - 7/22/2025  Expected length of service: ongoing treatment  My Physician/PA/NP and I have developed this plan together and I agree to work on the goals and objectives. I understand the treatment goals that were developed for my treatment.
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
## 2025-01-29 ENCOUNTER — PATIENT MESSAGE (OUTPATIENT)
Age: 40
End: 2025-01-29

## 2025-01-29 DIAGNOSIS — J44.9 CHRONIC OBSTRUCTIVE PULMONARY DISEASE, UNSPECIFIED COPD TYPE (HCC): ICD-10-CM

## 2025-01-29 DIAGNOSIS — J45.909 UNCOMPLICATED ASTHMA, UNSPECIFIED ASTHMA SEVERITY, UNSPECIFIED WHETHER PERSISTENT: Primary | ICD-10-CM

## 2025-01-30 RX ORDER — IPRATROPIUM BROMIDE AND ALBUTEROL SULFATE 2.5; .5 MG/3ML; MG/3ML
3 SOLUTION RESPIRATORY (INHALATION) EVERY 6 HOURS PRN
Qty: 360 ML | Refills: 1 | Status: SHIPPED | OUTPATIENT
Start: 2025-01-30

## 2025-02-03 ENCOUNTER — TELEPHONE (OUTPATIENT)
Age: 40
End: 2025-02-03

## 2025-02-03 NOTE — TELEPHONE ENCOUNTER
Response requested: alternative requested from [facility]    IPRAT_ALBUT 0.5-3(2.5) MG/3 ML  Not covered by insurance.

## 2025-02-04 ENCOUNTER — TELEPHONE (OUTPATIENT)
Dept: BEHAVIORAL/MENTAL HEALTH CLINIC | Facility: CLINIC | Age: 40
End: 2025-02-04

## 2025-02-04 NOTE — TELEPHONE ENCOUNTER
This writer left message advising NP TT appt on 3/7 need to be rescheduled.  Provider ooo.  Gave my direct # to contact to resc

## 2025-02-07 ENCOUNTER — TELEPHONE (OUTPATIENT)
Age: 40
End: 2025-02-07

## 2025-02-07 NOTE — TELEPHONE ENCOUNTER
Fax received from [facility]  Not covered by insurance  FLUTICASONE PROP  MCG    Can you send an alternative medication?

## 2025-02-10 DIAGNOSIS — J44.9 CHRONIC OBSTRUCTIVE PULMONARY DISEASE, UNSPECIFIED COPD TYPE (HCC): Primary | ICD-10-CM

## 2025-02-10 RX ORDER — FLUTICASONE FUROATE 100 UG/1
1 POWDER RESPIRATORY (INHALATION) DAILY
Qty: 30 BLISTER | Refills: 0 | Status: SHIPPED | OUTPATIENT
Start: 2025-02-10 | End: 2025-03-12

## 2025-02-13 DIAGNOSIS — F31.9 BIPOLAR 1 DISORDER (HCC): ICD-10-CM

## 2025-02-14 DIAGNOSIS — F41.9 ANXIETY: ICD-10-CM

## 2025-02-14 RX ORDER — OLANZAPINE 5 MG/1
5 TABLET ORAL
Qty: 90 TABLET | Refills: 1 | OUTPATIENT
Start: 2025-02-14

## 2025-02-14 RX ORDER — BUSPIRONE HYDROCHLORIDE 5 MG/1
5 TABLET ORAL 2 TIMES DAILY
Qty: 60 TABLET | Refills: 2 | Status: SHIPPED | OUTPATIENT
Start: 2025-02-14 | End: 2025-03-16

## 2025-02-24 DIAGNOSIS — F33.2 SEVERE EPISODE OF RECURRENT MAJOR DEPRESSIVE DISORDER, WITHOUT PSYCHOTIC FEATURES (HCC): ICD-10-CM

## 2025-02-24 RX ORDER — FLUOXETINE 10 MG/1
10 CAPSULE ORAL DAILY
Qty: 30 CAPSULE | Refills: 1 | Status: SHIPPED | OUTPATIENT
Start: 2025-02-24

## 2025-02-26 ENCOUNTER — OFFICE VISIT (OUTPATIENT)
Dept: PSYCHIATRY | Facility: CLINIC | Age: 40
End: 2025-02-26
Payer: MEDICARE

## 2025-02-26 DIAGNOSIS — F43.10 POST TRAUMATIC STRESS DISORDER (PTSD): ICD-10-CM

## 2025-02-26 DIAGNOSIS — F41.1 GENERALIZED ANXIETY DISORDER: ICD-10-CM

## 2025-02-26 DIAGNOSIS — F31.9 BIPOLAR 1 DISORDER (HCC): Primary | ICD-10-CM

## 2025-02-26 PROCEDURE — 99214 OFFICE O/P EST MOD 30 MIN: CPT | Performed by: STUDENT IN AN ORGANIZED HEALTH CARE EDUCATION/TRAINING PROGRAM

## 2025-02-26 RX ORDER — HYDROXYZINE HYDROCHLORIDE 25 MG/1
25 TABLET, FILM COATED ORAL
Qty: 90 TABLET | Refills: 1 | Status: SHIPPED | OUTPATIENT
Start: 2025-02-26

## 2025-02-26 RX ORDER — OLANZAPINE 5 MG/1
5 TABLET ORAL
Qty: 30 TABLET | Refills: 1 | Status: SHIPPED | OUTPATIENT
Start: 2025-02-26

## 2025-02-26 NOTE — ASSESSMENT & PLAN NOTE
Symptoms currently appear to be stable.  Patient reports that she is currently without PTSD nightmares.    Continue medications as above.    Patient has upcoming therapy appointment.

## 2025-02-26 NOTE — ASSESSMENT & PLAN NOTE
Symptoms are notably improved.  Patient has had improvement and stability in depressive symptoms.  We will continue to monitor for continued stability.    Continue Zyprexa 5 mg nightly.  Continue Prozac 10 mg daily.    Patient has upcoming therapy appointment.

## 2025-02-26 NOTE — ASSESSMENT & PLAN NOTE
Symptoms currently appear improved.  Anxiety has been overall stable.  Sleep has been improved.  Patient prefers to reduce dose of hydroxyzine.    Continue BuSpar 5 mg twice daily.  We will decrease hydroxyzine to 75 mg nightly at patient's request.    Patient has upcoming therapy appointment.
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
## 2025-03-11 ENCOUNTER — HOSPITAL ENCOUNTER (OUTPATIENT)
Dept: RADIOLOGY | Facility: HOSPITAL | Age: 40
Discharge: HOME | End: 2025-03-11

## 2025-03-11 VITALS — WEIGHT: 256 LBS | BODY MASS INDEX: 40.18 KG/M2 | HEIGHT: 67 IN

## 2025-03-11 DIAGNOSIS — Z12.31 ENCOUNTER FOR SCREENING MAMMOGRAM FOR BREAST CANCER: ICD-10-CM

## 2025-03-20 ENCOUNTER — HOSPITAL ENCOUNTER (OUTPATIENT)
Dept: RADIOLOGY | Facility: HOSPITAL | Age: 40
Discharge: HOME | End: 2025-03-20
Payer: MEDICARE

## 2025-03-20 ENCOUNTER — RESULTS FOLLOW-UP (OUTPATIENT)
Age: 40
End: 2025-03-20

## 2025-03-20 DIAGNOSIS — N63.0 LUMP IN FEMALE BREAST: ICD-10-CM

## 2025-03-20 DIAGNOSIS — R00.2 PALPITATION: ICD-10-CM

## 2025-03-20 DIAGNOSIS — Z12.31 ENCOUNTER FOR SCREENING MAMMOGRAM FOR BREAST CANCER: Primary | ICD-10-CM

## 2025-03-20 PROCEDURE — 77066 DX MAMMO INCL CAD BI: CPT

## 2025-03-20 PROCEDURE — G0279 TOMOSYNTHESIS, MAMMO: HCPCS

## 2025-03-20 PROCEDURE — 76642 ULTRASOUND BREAST LIMITED: CPT

## 2025-03-21 ENCOUNTER — OFFICE VISIT (OUTPATIENT)
Dept: BEHAVIORAL/MENTAL HEALTH CLINIC | Facility: CLINIC | Age: 40
End: 2025-03-21

## 2025-03-21 DIAGNOSIS — F41.1 GENERALIZED ANXIETY DISORDER: Primary | ICD-10-CM

## 2025-03-21 PROCEDURE — NOSHOW

## 2025-03-21 NOTE — PSYCH
No Call. No Show. No Charge    Wilma Weiss no showed 03/21/25 appointment , staff called and left message to reschedule appointment     Treatment Plan not due at this session.

## 2025-03-26 ENCOUNTER — TELEMEDICINE (OUTPATIENT)
Dept: PSYCHIATRY | Facility: CLINIC | Age: 40
End: 2025-03-26
Payer: MEDICARE

## 2025-03-26 ENCOUNTER — TELEPHONE (OUTPATIENT)
Age: 40
End: 2025-03-26

## 2025-03-26 DIAGNOSIS — Z79.899 LONG TERM CURRENT USE OF ANTIPSYCHOTIC MEDICATION: ICD-10-CM

## 2025-03-26 DIAGNOSIS — F43.10 POST TRAUMATIC STRESS DISORDER (PTSD): ICD-10-CM

## 2025-03-26 DIAGNOSIS — F31.9 BIPOLAR 1 DISORDER (HCC): Primary | ICD-10-CM

## 2025-03-26 DIAGNOSIS — F41.1 GENERALIZED ANXIETY DISORDER: ICD-10-CM

## 2025-03-26 PROCEDURE — 99213 OFFICE O/P EST LOW 20 MIN: CPT | Performed by: STUDENT IN AN ORGANIZED HEALTH CARE EDUCATION/TRAINING PROGRAM

## 2025-03-26 RX ORDER — FLUOXETINE 10 MG/1
10 CAPSULE ORAL DAILY
Qty: 90 CAPSULE | Refills: 0 | Status: SHIPPED | OUTPATIENT
Start: 2025-03-26

## 2025-03-26 RX ORDER — OLANZAPINE 5 MG/1
5 TABLET ORAL
Qty: 90 TABLET | Refills: 0 | Status: SHIPPED | OUTPATIENT
Start: 2025-03-26

## 2025-03-26 RX ORDER — HYDROXYZINE HYDROCHLORIDE 50 MG/1
100 TABLET, FILM COATED ORAL
Qty: 180 TABLET | Refills: 0 | Status: SHIPPED | OUTPATIENT
Start: 2025-03-26

## 2025-03-26 NOTE — ASSESSMENT & PLAN NOTE
Symptoms currently appear improved. Anxiety has been overall stable.     See plan for Bipolar Disorder.

## 2025-03-26 NOTE — ASSESSMENT & PLAN NOTE
Will check A1c and lipid panel due to continued need for Zyprexa and recent weight gain. Will monitor over time.

## 2025-03-26 NOTE — TELEPHONE ENCOUNTER
Pt called to get her appt for 3/26/25 at 1:30pm switched to a virtual visit due to patient has a cold and doesn't want to be around people if she is contagious.  Writer switched appt.
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
## 2025-03-26 NOTE — ASSESSMENT & PLAN NOTE
Symptoms are notably improved from initial visit and appear to be stable.  We will continue to monitor for continued stability.     Continue Zyprexa 5 mg nightly.  Continue Prozac 10 mg daily.    Can continue Hydroxyzine 100 mg QHS PRN for sleep.     Patient to reschedule missed therapy appointment.

## 2025-03-28 ENCOUNTER — RESULTS FOLLOW-UP (OUTPATIENT)
Age: 40
End: 2025-03-28

## 2025-03-28 ENCOUNTER — APPOINTMENT (OUTPATIENT)
Dept: LAB | Facility: HOSPITAL | Age: 40
End: 2025-03-28
Payer: MEDICARE

## 2025-03-28 DIAGNOSIS — E78.00 HIGH BLOOD CHOLESTEROL: Primary | ICD-10-CM

## 2025-03-28 RX ORDER — ATORVASTATIN CALCIUM 10 MG/1
10 TABLET, FILM COATED ORAL DAILY
Qty: 90 TABLET | Refills: 1 | Status: SHIPPED | OUTPATIENT
Start: 2025-03-28

## 2025-03-31 ENCOUNTER — TELEPHONE (OUTPATIENT)
Dept: PSYCHIATRY | Facility: CLINIC | Age: 40
End: 2025-03-31

## 2025-03-31 NOTE — TELEPHONE ENCOUNTER
Called patient but had to leave voicemail message requesting call back to schedule 2 month follow up appointment with Haider Napoles MD. Left call back # [phone].

## 2025-04-01 ENCOUNTER — TELEPHONE (OUTPATIENT)
Age: 40
End: 2025-04-01

## 2025-05-08 ENCOUNTER — HOSPITAL ENCOUNTER (EMERGENCY)
Facility: HOSPITAL | Age: 40
Discharge: HOME | End: 2025-05-08
Attending: EMERGENCY MEDICINE
Payer: MEDICARE

## 2025-05-08 ENCOUNTER — APPOINTMENT (EMERGENCY)
Dept: RADIOLOGY | Facility: HOSPITAL | Age: 40
End: 2025-05-08
Payer: MEDICARE

## 2025-05-08 VITALS
DIASTOLIC BLOOD PRESSURE: 73 MMHG | SYSTOLIC BLOOD PRESSURE: 179 MMHG | HEART RATE: 63 BPM | BODY MASS INDEX: 39.94 KG/M2 | WEIGHT: 255 LBS | OXYGEN SATURATION: 99 % | RESPIRATION RATE: 19 BRPM | TEMPERATURE: 98.4 F

## 2025-05-08 DIAGNOSIS — R10.9 LEFT FLANK PAIN: Primary | ICD-10-CM

## 2025-05-08 LAB
ALBUMIN SERPL BCG-MCNC: 4.2 G/DL (ref 3.5–5)
ALP SERPL-CCNC: 40 U/L (ref 34–104)
ALT SERPL W P-5'-P-CCNC: 17 U/L (ref 7–52)
ANION GAP SERPL CALCULATED.3IONS-SCNC: 10 MMOL/L (ref 4–13)
APTT PPP: 28 SECONDS (ref 23–34)
AST SERPL W P-5'-P-CCNC: 23 U/L (ref 13–39)
BACTERIA UR QL AUTO: NORMAL /HPF
BASOPHILS # BLD AUTO: 0.07 THOUSANDS/ÂΜL (ref 0–0.1)
BASOPHILS NFR BLD AUTO: 1 % (ref 0–1)
BILIRUB SERPL-MCNC: 0.29 MG/DL (ref 0.2–1)
BILIRUB UR QL STRIP: NEGATIVE
BUN SERPL-MCNC: 16 MG/DL (ref 5–25)
CALCIUM SERPL-MCNC: 9.2 MG/DL (ref 8.4–10.2)
CHLORIDE SERPL-SCNC: 107 MMOL/L (ref 96–108)
CLARITY UR: CLEAR
CO2 SERPL-SCNC: 20 MMOL/L (ref 21–32)
COLOR UR: YELLOW
CREAT SERPL-MCNC: 0.92 MG/DL (ref 0.6–1.3)
EOSINOPHIL # BLD AUTO: 0.32 THOUSAND/ÂΜL (ref 0–0.61)
EOSINOPHIL NFR BLD AUTO: 3 % (ref 0–6)
ERYTHROCYTE [DISTWIDTH] IN BLOOD BY AUTOMATED COUNT: 14.4 % (ref 11.6–15.1)
GFR SERPL CREATININE-BSD FRML MDRD: 78 ML/MIN/1.73SQ M
GLUCOSE SERPL-MCNC: 96 MG/DL (ref 65–140)
GLUCOSE UR STRIP-MCNC: NEGATIVE MG/DL
HCT VFR BLD AUTO: 39.7 % (ref 34.8–46.1)
HGB BLD-MCNC: 13.5 G/DL (ref 11.5–15.4)
HGB UR QL STRIP.AUTO: NEGATIVE
IMM GRANULOCYTES # BLD AUTO: 0.04 THOUSAND/UL (ref 0–0.2)
IMM GRANULOCYTES NFR BLD AUTO: 0 % (ref 0–2)
INR PPP: 1 (ref 0.85–1.19)
KETONES UR STRIP-MCNC: NEGATIVE MG/DL
LEUKOCYTE ESTERASE UR QL STRIP: NEGATIVE
LYMPHOCYTES # BLD AUTO: 3.11 THOUSANDS/ÂΜL (ref 0.6–4.47)
LYMPHOCYTES NFR BLD AUTO: 32 % (ref 14–44)
MCH RBC QN AUTO: 31.5 PG (ref 26.8–34.3)
MCHC RBC AUTO-ENTMCNC: 34 G/DL (ref 31.4–37.4)
MCV RBC AUTO: 93 FL (ref 82–98)
MONOCYTES # BLD AUTO: 0.9 THOUSAND/ÂΜL (ref 0.17–1.22)
MONOCYTES NFR BLD AUTO: 9 % (ref 4–12)
NEUTROPHILS # BLD AUTO: 5.19 THOUSANDS/ÂΜL (ref 1.85–7.62)
NEUTS SEG NFR BLD AUTO: 55 % (ref 43–75)
NITRITE UR QL STRIP: NEGATIVE
NON-SQ EPI CELLS URNS QL MICRO: NORMAL /HPF
NRBC BLD AUTO-RTO: 0 /100 WBCS
PH UR STRIP.AUTO: 6 [PH]
PLATELET # BLD AUTO: 284 THOUSANDS/UL (ref 149–390)
PMV BLD AUTO: 10 FL (ref 8.9–12.7)
POTASSIUM SERPL-SCNC: 4.6 MMOL/L (ref 3.5–5.3)
PROT SERPL-MCNC: 7.2 G/DL (ref 6.4–8.4)
PROT UR STRIP-MCNC: ABNORMAL MG/DL
PROTHROMBIN TIME: 13.7 SECONDS (ref 12.3–15)
RBC # BLD AUTO: 4.29 MILLION/UL (ref 3.81–5.12)
RBC #/AREA URNS AUTO: NORMAL /HPF
SODIUM SERPL-SCNC: 137 MMOL/L (ref 135–147)
SP GR UR STRIP.AUTO: 1.02 (ref 1–1.03)
UROBILINOGEN UR STRIP-ACNC: <2 MG/DL
WBC # BLD AUTO: 9.63 THOUSAND/UL (ref 4.31–10.16)
WBC #/AREA URNS AUTO: NORMAL /HPF

## 2025-05-08 PROCEDURE — 96374 THER/PROPH/DIAG INJ IV PUSH: CPT

## 2025-05-08 PROCEDURE — 99284 EMERGENCY DEPT VISIT MOD MDM: CPT | Performed by: EMERGENCY MEDICINE

## 2025-05-08 PROCEDURE — 99284 EMERGENCY DEPT VISIT MOD MDM: CPT

## 2025-05-08 PROCEDURE — 96361 HYDRATE IV INFUSION ADD-ON: CPT

## 2025-05-08 PROCEDURE — 36415 COLL VENOUS BLD VENIPUNCTURE: CPT | Performed by: EMERGENCY MEDICINE

## 2025-05-08 PROCEDURE — 87086 URINE CULTURE/COLONY COUNT: CPT | Performed by: EMERGENCY MEDICINE

## 2025-05-08 PROCEDURE — 81001 URINALYSIS AUTO W/SCOPE: CPT | Performed by: EMERGENCY MEDICINE

## 2025-05-08 PROCEDURE — 85025 COMPLETE CBC W/AUTO DIFF WBC: CPT | Performed by: EMERGENCY MEDICINE

## 2025-05-08 PROCEDURE — 85610 PROTHROMBIN TIME: CPT | Performed by: EMERGENCY MEDICINE

## 2025-05-08 PROCEDURE — 80053 COMPREHEN METABOLIC PANEL: CPT | Performed by: EMERGENCY MEDICINE

## 2025-05-08 PROCEDURE — 85730 THROMBOPLASTIN TIME PARTIAL: CPT | Performed by: EMERGENCY MEDICINE

## 2025-05-08 PROCEDURE — 74177 CT ABD & PELVIS W/CONTRAST: CPT

## 2025-05-08 RX ORDER — METHOCARBAMOL 500 MG/1
500 TABLET, FILM COATED ORAL 2 TIMES DAILY
Qty: 10 TABLET | Refills: 0 | Status: SHIPPED | OUTPATIENT
Start: 2025-05-08 | End: 2025-05-14 | Stop reason: SDUPTHER

## 2025-05-08 RX ORDER — KETOROLAC TROMETHAMINE 30 MG/ML
15 INJECTION, SOLUTION INTRAMUSCULAR; INTRAVENOUS ONCE
Status: COMPLETED | OUTPATIENT
Start: 2025-05-08 | End: 2025-05-08

## 2025-05-08 RX ORDER — METHOCARBAMOL 500 MG/1
500 TABLET, FILM COATED ORAL ONCE
Status: COMPLETED | OUTPATIENT
Start: 2025-05-08 | End: 2025-05-08

## 2025-05-08 RX ADMIN — KETOROLAC TROMETHAMINE 15 MG: 30 INJECTION, SOLUTION INTRAMUSCULAR; INTRAVENOUS at 20:00

## 2025-05-08 RX ADMIN — IOHEXOL 100 ML: 350 INJECTION, SOLUTION INTRAVENOUS at 20:24

## 2025-05-08 RX ADMIN — SODIUM CHLORIDE 1000 ML: 0.9 INJECTION, SOLUTION INTRAVENOUS at 20:00

## 2025-05-08 RX ADMIN — METHOCARBAMOL 500 MG: 500 TABLET ORAL at 22:51

## 2025-05-08 NOTE — Clinical Note
Wilma Weiss was seen and treated in our emergency department on 5/8/2025.                Diagnosis:     Wilma  may return to work on return date.    She may return on this date: 05/11/2025         If you have any questions or concerns, please don't hesitate to call.      Kj Tatum, DO    ______________________________           _______________          _______________  Hospital Representative                              Date                                Time

## 2025-05-09 ENCOUNTER — TELEPHONE (OUTPATIENT)
Age: 40
End: 2025-05-09

## 2025-05-09 ENCOUNTER — TELEPHONE (OUTPATIENT)
Dept: PHYSICAL THERAPY | Facility: OTHER | Age: 40
End: 2025-05-09

## 2025-05-09 NOTE — TELEPHONE ENCOUNTER
Call placed to the patient per [facility] referral.    V/M left for patient to call back. Phone number and hours of business provided.    This is the 1st attempt to reach the patient. Will defer referral per protocol.    Left sided lower/flank pain.

## 2025-05-09 NOTE — ED PROVIDER NOTES
Time reflects when diagnosis was documented in both MDM as applicable and the Disposition within this note       Time User Action Codes Description Comment    5/8/2025 10:40 PM Kj Tatum Add [R10.9] Left flank pain           ED Disposition       ED Disposition   Discharge    Condition   Stable    Date/Time   u May 8, 2025 10:40 PM    Comment   Wilma Weiss discharge to home/self care.                   Assessment & Plan       Medical Decision Making  39-year-old female with pain in her left paravertebral/flank region x 4 days    Amount and/or Complexity of Data Reviewed  Labs: ordered.  Radiology: ordered.    Risk  Prescription drug management.             Medications   methocarbamol (ROBAXIN) tablet 500 mg (has no administration in time range)   sodium chloride 0.9 % bolus 1,000 mL (1,000 mL Intravenous New Bag 5/8/25 2000)   ketorolac (TORADOL) injection 15 mg (15 mg Intravenous Given 5/8/25 2000)   iohexol (OMNIPAQUE) 350 MG/ML injection (MULTI-DOSE) 100 mL (100 mL Intravenous Given 5/8/25 2024)       ED Risk Strat Scores                    No data recorded        SBIRT 20yo+      Flowsheet Row Most Recent Value   Initial Alcohol Screen: US AUDIT-C     1. How often do you have a drink containing alcohol? 0 Filed at: 05/08/2025 1917   2. How many drinks containing alcohol do you have on a typical day you are drinking?  0 Filed at: 05/08/2025 1917   3a. Male UNDER 65: How often do you have five or more drinks on one occasion? 0 Filed at: 05/08/2025 1917   3b. FEMALE Any Age, or MALE 65+: How often do you have 4 or more drinks on one occassion? 0 Filed at: 05/08/2025 1917   Audit-C Score 0 Filed at: 05/08/2025 1917   SAMANTHA: How many times in the past year have you...    Used an illegal drug or used a prescription medication for non-medical reasons? Never Filed at: 05/08/2025 1917                            History of Present Illness       Chief Complaint   Patient presents with    Flank Pain     Left side  flank pain with increased urination for last 2 weeks       Past Medical History:   Diagnosis Date    Asthma     COPD (chronic obstructive pulmonary disease) (HCC)     Effusion of left knee 7/3/2018    Gastrocnemius tear, left, subsequent encounter 7/3/2018    Left buttock abscess 3/1/2023    Macular degeneration     right eye    Palpitation 3/10/2023    Periodontitis     Psychiatric disorder     depression, anxiety    Sepsis (HCC) 3/1/2023      Past Surgical History:   Procedure Laterality Date     SECTION      CT I&D ISCHIORECTAL&/PERIRECTAL ABSCESS SPX N/A 3/1/2023    Procedure: INCISION AND DRAINAGE (I&D) PERIRECTAL ABSCESS;  Surgeon: José Miguel Grimm MD;  Location: [facility];  Service: General    TONSILECTOMY AND ADNOIDECTOMY      TUBAL LIGATION        Family History   Problem Relation Age of Onset    Hypertension Mother     Hyperlipidemia Mother     Arthritis Mother     Diabetes Mother     Asthma Mother     Thyroid disease Mother     Fibrocystic breast disease Mother     Diabetes Father     Hypertension Father     Thyroid disease Sister     Diabetes Sister     Diabetes Sister     Thyroid disease Daughter     Hyperlipidemia Daughter     Asthma Daughter       Social History     Tobacco Use    Smoking status: Former     Current packs/day: 0.50     Average packs/day: 0.5 packs/day for 25.0 years (12.5 ttl pk-yrs)     Types: Cigarettes    Smokeless tobacco: Never   Vaping Use    Vaping status: Never Used   Substance Use Topics    Alcohol use: No     Comment: socially /  never per [employer]    Drug use: Yes     Types: Marijuana      E-Cigarette/Vaping    E-Cigarette Use Never User       E-Cigarette/Vaping Substances    Nicotine No     THC No     CBD No     Flavoring No     Other No     Unknown No       I have reviewed and agree with the history as documented.     39-year-old female presents with pain in her left flank and left lower paravertebral region worse with movement.  This has been going on for  about 4 days she feels that it is her kidney.  However she has not had any issues with her kidney in the past.  No fevers chills nausea vomiting or diarrhea.  No known injury to her knowledge        Review of Systems   Constitutional:  Negative for activity change, chills, diaphoresis and fever.   HENT:  Negative for congestion, ear pain, nosebleeds, sore throat, trouble swallowing and voice change.    Eyes:  Negative for pain, discharge and redness.   Respiratory:  Negative for apnea, cough, choking, shortness of breath, wheezing and stridor.    Cardiovascular:  Negative for chest pain and palpitations.   Gastrointestinal:  Negative for abdominal distention, abdominal pain, constipation, diarrhea, nausea and vomiting.   Endocrine: Negative for polydipsia.   Genitourinary:  Positive for flank pain. Negative for difficulty urinating, dysuria, frequency, hematuria and urgency.   Musculoskeletal:  Positive for back pain. Negative for gait problem, joint swelling, myalgias, neck pain and neck stiffness.   Skin:  Negative for pallor and rash.   Neurological:  Negative for dizziness, tremors, syncope, speech difficulty, weakness, numbness and headaches.   Hematological:  Negative for adenopathy.   Psychiatric/Behavioral:  Negative for confusion, hallucinations, self-injury and suicidal ideas. The patient is not nervous/anxious.            Objective       ED Triage Vitals   Temperature Pulse Blood Pressure Respirations SpO2 Patient Position - Orthostatic VS   05/08/25 1917 05/08/25 1917 05/08/25 1920 05/08/25 1917 05/08/25 1917 --   98.4 °F (36.9 °C) 90 (!) 182/95 20 98 %       Temp src Heart Rate Source BP Location FiO2 (%) Pain Score    -- 05/08/25 1945 -- -- 05/08/25 1917     Monitor   10 - Worst Possible Pain      Vitals      Date and Time Temp Pulse SpO2 Resp BP Pain Score FACES Pain Rating User   05/08/25 2100 -- 62 98 % 18 120/61 -- -- AM   05/08/25 2037 -- -- -- -- -- 8 -- AM   05/08/25 2000 -- 65 97 % 18 120/65 --  -- AM   05/08/25 2000 -- -- -- -- -- 10 - Worst Possible Pain -- AM   05/08/25 1945 -- 77 96 % 17 126/67 -- -- AM   05/08/25 1920 -- -- -- -- 182/95 -- -- PRASAD   05/08/25 1917 98.4 °F (36.9 °C) 90 98 % 20 -- 10 - Worst Possible Pain -- PRASAD            Physical Exam  Vitals and nursing note reviewed.   Constitutional:       General: She is not in acute distress.     Appearance: She is well-developed. She is not diaphoretic.   HENT:      Head: Normocephalic and atraumatic.      Right Ear: External ear normal.      Left Ear: External ear normal.      Nose: Nose normal.   Eyes:      Conjunctiva/sclera: Conjunctivae normal.      Pupils: Pupils are equal, round, and reactive to light.   Cardiovascular:      Rate and Rhythm: Normal rate and regular rhythm.      Heart sounds: Normal heart sounds.   Pulmonary:      Effort: Pulmonary effort is normal.      Breath sounds: Normal breath sounds.   Abdominal:      General: Bowel sounds are normal.      Palpations: Abdomen is soft.      Tenderness: There is no abdominal tenderness.   Musculoskeletal:         General: Tenderness present. No signs of injury. Normal range of motion.      Cervical back: Normal range of motion and neck supple.      Comments: Left paravertebral musculature tenderness on palpation lumbar spine.   Skin:     General: Skin is warm and dry.   Neurological:      Mental Status: She is alert and oriented to person, place, and time.      Deep Tendon Reflexes: Reflexes are normal and symmetric.   Psychiatric:         Behavior: Behavior is cooperative.         Results Reviewed       Procedure Component Value Units Date/Time    Urine Microscopic [[id number]]  (Normal) Collected: 05/08/25 2136    Lab Status: Final result Specimen: Urine, Clean Catch Updated: 05/08/25 2212     RBC, UA 0-1 /hpf      WBC, UA 0-1 /hpf      Epithelial Cells Occasional /hpf      Bacteria, UA Occasional /hpf     UA (URINE) with reflex to Scope [[id number]]  (Abnormal) Collected: 05/08/25 2136     Lab Status: Final result Specimen: Urine, Clean Catch Updated: 05/08/25 2157     Color, UA Yellow     Clarity, UA Clear     Specific Gravity, UA 1.025     pH, UA 6.0     Leukocytes, UA Negative     Nitrite, UA Negative     Protein, UA 30 (1+) mg/dl      Glucose, UA Negative mg/dl      Ketones, UA Negative mg/dl      Urobilinogen, UA <2.0 mg/dl      Bilirubin, UA Negative     Occult Blood, UA Negative    Urine culture [[id number]] Collected: 05/08/25 2136    Lab Status: In process Specimen: Urine, Clean Catch Updated: 05/08/25 2152    Comprehensive metabolic panel [[id number]]  (Abnormal) Collected: 05/08/25 1959    Lab Status: Final result Specimen: Blood from Arm, Left Updated: 05/08/25 2027     Sodium 137 mmol/L      Potassium 4.6 mmol/L      Chloride 107 mmol/L      CO2 20 mmol/L      ANION GAP 10 mmol/L      BUN 16 mg/dL      Creatinine 0.92 mg/dL      Glucose 96 mg/dL      Calcium 9.2 mg/dL      AST 23 U/L      ALT 17 U/L      Alkaline Phosphatase 40 U/L      Total Protein 7.2 g/dL      Albumin 4.2 g/dL      Total Bilirubin 0.29 mg/dL      eGFR 78 ml/min/1.73sq m     Narrative:      National Kidney Disease Foundation guidelines for Chronic Kidney Disease (CKD):     Stage 1 with normal or high GFR (GFR > 90 mL/min/1.73 square meters)    Stage 2 Mild CKD (GFR = 60-89 mL/min/1.73 square meters)    Stage 3A Moderate CKD (GFR = 45-59 mL/min/1.73 square meters)    Stage 3B Moderate CKD (GFR = 30-44 mL/min/1.73 square meters)    Stage 4 Severe CKD (GFR = 15-29 mL/min/1.73 square meters)    Stage 5 End Stage CKD (GFR <15 mL/min/1.73 square meters)  Note: GFR calculation is accurate only with a steady state creatinine    Protime-INR [[id number]]  (Normal) Collected: 05/08/25 1959    Lab Status: Final result Specimen: Blood from Arm, Left Updated: 05/08/25 2021     Protime 13.7 seconds      INR 1.00    Narrative:      INR Therapeutic Range    Indication                                             INR Range      Atrial  Fibrillation                                               2.0-3.0  Hypercoagulable State                                    2.0.2.3  Left Ventricular Asist Device                            2.0-3.0  Mechanical Heart Valve                                  -    Aortic(with afib, MI, embolism, HF, LA enlargement,    and/or coagulopathy)                                     2.0-3.0 (2.5-3.5)     Mitral                                                             2.5-3.5  Prosthetic/Bioprosthetic Heart Valve               2.0-3.0  Venous thromboembolism (VTE: VT, PE        2.0-3.0    APTT [[id number]]  (Normal) Collected: 05/08/25 1959    Lab Status: Final result Specimen: Blood from Arm, Left Updated: 05/08/25 2021     PTT 28 seconds     CBC and differential [[id number]] Collected: 05/08/25 1959    Lab Status: Final result Specimen: Blood from Arm, Left Updated: 05/08/25 2007     WBC 9.63 Thousand/uL      RBC 4.29 Million/uL      Hemoglobin 13.5 g/dL      Hematocrit 39.7 %      MCV 93 fL      MCH 31.5 pg      MCHC 34.0 g/dL      RDW 14.4 %      MPV 10.0 fL      Platelets 284 Thousands/uL      nRBC 0 /100 WBCs      Segmented % 55 %      Immature Grans % 0 %      Lymphocytes % 32 %      Monocytes % 9 %      Eosinophils Relative 3 %      Basophils Relative 1 %      Absolute Neutrophils 5.19 Thousands/µL      Absolute Immature Grans 0.04 Thousand/uL      Absolute Lymphocytes 3.11 Thousands/µL      Absolute Monocytes 0.90 Thousand/µL      Eosinophils Absolute 0.32 Thousand/µL      Basophils Absolute 0.07 Thousands/µL             CT abdomen pelvis with contrast   Final Interpretation by Jarrett Pinon MD (05/08 2209)      No acute findings in the abdomen or pelvis.      Cholelithiasis with no pericholecystic inflammatory changes.      No hydronephrosis.      Workstation performed: [id number]             Procedures    ED Medication and Procedure Management   Prior to Admission Medications   Prescriptions Last Dose Informant  Patient Reported? Taking?   FLUoxetine (PROzac) 10 mg capsule   No No   Sig: Take 1 capsule (10 mg total) by mouth daily   OLANZapine (ZyPREXA) 5 mg tablet   No No   Sig: Take 1 tablet (5 mg total) by mouth daily at bedtime   atorvastatin (LIPITOR) 10 mg tablet   No No   Sig: Take 1 tablet (10 mg total) by mouth daily   busPIRone (BUSPAR) 5 mg tablet   No No   Sig: Take 1 tablet (5 mg total) by mouth 2 (two) times a day   fluticasone (Arnuity Ellipta) 100 MCG/ACT AEPB inhaler   No No   Sig: Inhale 1 puff daily Rinse mouth after use.   hydrOXYzine HCL (ATARAX) 50 mg tablet   No No   Sig: Take 2 tablets (100 mg total) by mouth daily at bedtime as needed for anxiety   ipratropium-albuterol (DUO-NEB) 0.5-2.5 mg/3 mL nebulizer solution   No No   Sig: Take 3 mL by nebulization every 6 (six) hours as needed for wheezing or shortness of breath      Facility-Administered Medications: None     Patient's Medications   Discharge Prescriptions    METHOCARBAMOL (ROBAXIN) 500 MG TABLET    Take 1 tablet (500 mg total) by mouth 2 (two) times a day       Start Date: 5/8/2025  End Date: --       Order Dose: 500 mg       Quantity: 10 tablet    Refills: 0       ED SEPSIS DOCUMENTATION   Time reflects when diagnosis was documented in both MDM as applicable and the Disposition within this note       Time User Action Codes Description Comment    5/8/2025 10:40 PM Kj Tatum Add [R10.9] Left flank pain                  Kj Tatum DO  05/08/25 4617

## 2025-05-09 NOTE — TELEPHONE ENCOUNTER
Patient was seen in ED, 5/8/25 No out reach done upon chart review Patient has been referred to follow up with Spine and Pain [facility].     ED:[facility]  CC: Flank Pain  DX:Left flank pin  Time: 7:15pm  Last OV: 12/17/24    No further action needed at this time.

## 2025-05-10 LAB — BACTERIA UR CULT: NORMAL

## 2025-05-13 ENCOUNTER — PATIENT MESSAGE (OUTPATIENT)
Dept: PERINATAL CARE | Facility: CLINIC | Age: 40
End: 2025-05-13

## 2025-05-13 DIAGNOSIS — J45.901 PERSISTENT ASTHMA WITH ACUTE EXACERBATION, UNSPECIFIED ASTHMA SEVERITY: Primary | ICD-10-CM

## 2025-05-13 RX ORDER — TIOTROPIUM BROMIDE INHALATION SPRAY 1.56 UG/1
2 SPRAY, METERED RESPIRATORY (INHALATION) DAILY
Qty: 4 G | Refills: 5 | Status: SHIPPED | OUTPATIENT
Start: 2025-05-13

## 2025-05-14 ENCOUNTER — TELEMEDICINE (OUTPATIENT)
Age: 40
End: 2025-05-14

## 2025-05-14 DIAGNOSIS — R10.9 LEFT FLANK PAIN: ICD-10-CM

## 2025-05-14 PROCEDURE — G2025 DIS SITE TELE SVCS RHC/FQHC: HCPCS | Performed by: FAMILY MEDICINE

## 2025-05-14 RX ORDER — METHOCARBAMOL 500 MG/1
500 TABLET, FILM COATED ORAL 2 TIMES DAILY
Qty: 30 TABLET | Refills: 0 | Status: SHIPPED | OUTPATIENT
Start: 2025-05-14

## 2025-05-14 RX ORDER — LIDOCAINE 4 G/G
1 PATCH TOPICAL DAILY
Qty: 15 PATCH | Refills: 0 | Status: SHIPPED | OUTPATIENT
Start: 2025-05-14 | End: 2025-05-15

## 2025-05-16 PROBLEM — R10.9 LEFT FLANK PAIN: Status: ACTIVE | Noted: 2025-05-16

## 2025-05-16 NOTE — ASSESSMENT & PLAN NOTE
Patient was seen in the ED on 05/08 for left-sided flank pain that started at work.  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  She had a sudden pain during movement to make one of the kids still on the road, that initiated the back pain episode.  Patient does chronically suffer from back pains and has had multiple scattered osteophytes and degenerative changes in her back as indicated in her prior CT scans  Patient was given NS bolus, Robaxin and naproxen in the ED and discharged home to follow with PCP  Patient has been doing very well with her medications, and endorses considerable amount of improvement in her mobility  She already has a referral for comprehensive spine therapy from the ED, but missed the call from them this a.m.  She does plan on starting physical therapy soon.     Plan:  She was educated on starting physical therapy exercises for long-term management of back pain   Refills sent for another week.  Advised to observe caution while taking these medications and educated regarding side effects.  Patient endorses understanding.  Advised to apply Voltaren gel with heating pads in her back as needed  Advised to use lidocaine patches every 8 hours as needed  She can continue to take naproxen with meals every 12 hours as needed  Follow up if no improvement        Orders:    methocarbamol (ROBAXIN) 500 mg tablet; Take 1 tablet (500 mg total) by mouth 2 (two) times a day    Diclofenac Sodium (VOLTAREN) 1 %; Apply 2 g topically 4 (four) times a day    Lidocaine 4 % PTCH; Apply 1 patch topically in the morning for 1 dose     Cantharidin Pregnancy And Lactation Text: This medication has not been proven safe during pregnancy. It is unknown if this medication is excreted in breast milk.

## 2025-05-16 NOTE — PROGRESS NOTES
Virtual Regular VisitName: Wilma Weiss      : 1985      MRN: [id number]  Encounter Provider: Jenny Chan MD  Encounter Date: 2025   Encounter department: [facility]  :  Assessment & Plan  Left flank pain  Patient was seen in the ED on  for left-sided flank pain that started at work.  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  She had a sudden pain during movement to make one of the kids still on the road, that initiated the back pain episode.  Patient does chronically suffer from back pains and has had multiple scattered osteophytes and degenerative changes in her back as indicated in her prior CT scans  Patient was given NS bolus, Robaxin and naproxen in the ED and discharged home to follow with PCP  Patient has been doing very well with her medications, and endorses considerable amount of improvement in her mobility  She already has a referral for comprehensive spine therapy from the ED, but missed the call from them this a.m.  She does plan on starting physical therapy soon.     Plan:  She was educated on starting physical therapy exercises for long-term management of back pain   Refills sent for another week.  Advised to observe caution while taking these medications and educated regarding side effects.  Patient endorses understanding.  Advised to apply Voltaren gel with heating pads in her back as needed  Advised to use lidocaine patches every 8 hours as needed  She can continue to take naproxen with meals every 12 hours as needed  Follow up if no improvement        Orders:    methocarbamol (ROBAXIN) 500 mg tablet; Take 1 tablet (500 mg total) by mouth 2 (two) times a day    Diclofenac Sodium (VOLTAREN) 1 %; Apply 2 g topically 4 (four) times a day    Lidocaine 4 % PTCH; Apply 1 patch topically in the morning for 1 dose                   History of Present Illness     39-year-old female with past medical  history of asthma, COPD, anxiety depression, s/p tubal ligation 2012, had a televisit appointment today for extension of treatment for her left flank pain      Review of Systems   Constitutional:  Negative for chills and fever.   HENT:  Negative for ear pain and sore throat.    Eyes:  Negative for pain and visual disturbance.   Respiratory:  Negative for cough and shortness of breath.    Cardiovascular:  Negative for chest pain and palpitations.   Gastrointestinal:  Negative for abdominal pain and vomiting.   Genitourinary:  Negative for dysuria and hematuria.   Musculoskeletal:  Positive for back pain. Negative for arthralgias.   Skin:  Negative for color change and rash.   Neurological:  Negative for seizures and syncope.   All other systems reviewed and are negative.      Objective   There were no vitals taken for this visit.          Administrative Statements   Encounter provider Jenny Chan MD    The Patient is located at Home and in the following state in which I hold an active license [place].    The patient was identified by name and date of birth. Wilma Weiss was informed that this is a telemedicine visit and that the visit is being conducted through Telephone.  My office door was closed. No one else was in the room.  She acknowledged consent and understanding of privacy and security of the video platform. The patient has agreed to participate and understands they can discontinue the visit at any time.    I have spent a total time of 7 minutes in caring for this patient on the day of the visit/encounter including Risks and benefits of tx options, Instructions for management, Patient and family education, Importance of tx compliance, Risk factor reductions, Counseling / Coordination of care, Documenting in the medical record, Reviewing/placing orders in the medical record (including tests, medications, and/or procedures), and Obtaining or reviewing history  , not including the time spent for establishing  "the audio/video connection.            Portions of the record may have been created with voice recognition software. Occasional wrong word or \"sound a like\" substitutions may have occurred due to the inherent limitations of voice recognition software. Read the chart carefully and recognize, using context, where substitutions have occurred.If you have any questions, please contact the dictating provider.       Jenny Chan MD  PGY2 Family Medicine Resident  [facility]          "

## 2025-05-23 DIAGNOSIS — F41.9 ANXIETY: ICD-10-CM

## 2025-05-23 RX ORDER — BUSPIRONE HYDROCHLORIDE 5 MG/1
5 TABLET ORAL 2 TIMES DAILY
Qty: 60 TABLET | Refills: 2 | OUTPATIENT
Start: 2025-05-23 | End: 2025-06-22

## 2025-05-23 NOTE — TELEPHONE ENCOUNTER
Patient is following psychiatrist. She should be getting this medication from psychiatrist now on to not interfere with psychiatrist's treatment.

## 2025-05-27 DIAGNOSIS — R10.9 LEFT FLANK PAIN: ICD-10-CM

## 2025-05-27 RX ORDER — METHOCARBAMOL 500 MG/1
500 TABLET, FILM COATED ORAL 2 TIMES DAILY
Qty: 30 TABLET | Refills: 0 | Status: SHIPPED | OUTPATIENT
Start: 2025-05-27

## 2025-06-02 ENCOUNTER — TELEMEDICINE (OUTPATIENT)
Dept: PSYCHIATRY | Facility: CLINIC | Age: 40
End: 2025-06-02
Payer: MEDICARE

## 2025-06-02 DIAGNOSIS — F41.1 GENERALIZED ANXIETY DISORDER: ICD-10-CM

## 2025-06-02 DIAGNOSIS — F31.9 BIPOLAR 1 DISORDER (HCC): Primary | ICD-10-CM

## 2025-06-02 PROCEDURE — 99214 OFFICE O/P EST MOD 30 MIN: CPT | Performed by: STUDENT IN AN ORGANIZED HEALTH CARE EDUCATION/TRAINING PROGRAM

## 2025-06-02 RX ORDER — HYDROXYZINE HYDROCHLORIDE 50 MG/1
100 TABLET, FILM COATED ORAL
Qty: 180 TABLET | Refills: 0 | Status: SHIPPED | OUTPATIENT
Start: 2025-06-02

## 2025-06-02 RX ORDER — OLANZAPINE 5 MG/1
5 TABLET, FILM COATED ORAL
Qty: 90 TABLET | Refills: 0 | Status: SHIPPED | OUTPATIENT
Start: 2025-06-02

## 2025-06-02 RX ORDER — BUSPIRONE HYDROCHLORIDE 5 MG/1
5 TABLET ORAL 2 TIMES DAILY
Qty: 180 TABLET | Refills: 1 | Status: SHIPPED | OUTPATIENT
Start: 2025-06-02

## 2025-06-02 NOTE — ASSESSMENT & PLAN NOTE
Symptoms are worsened at this time. She has ongoing family stressors that have been affecting her anxiety. She finds buspar does help, particularly in evening, though will focus on titration of Prozac initially.    See plan for Bipolar Disorder.
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
## 2025-06-02 NOTE — ASSESSMENT & PLAN NOTE
Anxiety has been worsened which has affected mood. She has had good stability of mood and no recurrence of manic symptoms, though has had some increased feelings of depression. Will titrate Prozac to better manage depressive symptoms and anxiety. Will monitor for any symptoms of hypomania or león. She denies any SI, HI, or AVH and there does not appear to be an imminent danger to the patient's safety or the safety of others at this time.     Continue Zyprexa 5 mg nightly.  Increase Prozac to 20 mg daily.  Continue Buspar 5 mg BID.    Can continue Hydroxyzine 100 mg QHS PRN for sleep.

## 2025-06-30 ENCOUNTER — TELEMEDICINE (OUTPATIENT)
Dept: PSYCHIATRY | Facility: CLINIC | Age: 40
End: 2025-06-30
Payer: MEDICARE

## 2025-06-30 DIAGNOSIS — Z79.899 LONG TERM CURRENT USE OF ANTIPSYCHOTIC MEDICATION: ICD-10-CM

## 2025-06-30 DIAGNOSIS — F41.1 GENERALIZED ANXIETY DISORDER: ICD-10-CM

## 2025-06-30 DIAGNOSIS — F31.9 BIPOLAR 1 DISORDER (HCC): Primary | ICD-10-CM

## 2025-06-30 DIAGNOSIS — F43.10 POST TRAUMATIC STRESS DISORDER (PTSD): ICD-10-CM

## 2025-06-30 PROCEDURE — 99214 OFFICE O/P EST MOD 30 MIN: CPT | Performed by: STUDENT IN AN ORGANIZED HEALTH CARE EDUCATION/TRAINING PROGRAM

## 2025-06-30 NOTE — ASSESSMENT & PLAN NOTE
As above, patient has had frustration with weight gain since taking the Paxil.  While Zyprexa has been a very helpful medication, she has not noticed any reduction in weight despite lifestyle modification.  Patient is amenable to trial of metformin to assist with this.    Start metformin 5 mg twice daily.  Will titrate to efficacy as indicated/tolerated.

## 2025-06-30 NOTE — ASSESSMENT & PLAN NOTE
Symptoms appear to be improved.  She has had overall very good benefit with combination of Prozac and Zyprexa.  She continues to have a stress related to family, though feels that she is more easily able to manage these stressors.  As below, she has had frustration with antipsychotic related weight gain despite continued attempts at life's modification.     Continue Zyprexa 5 mg nightly.  Continue Prozac 20 mg daily.  Continue Buspar 5 mg BID.    Can continue Hydroxyzine 100 mg QHS PRN for sleep.
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
## 2025-07-22 DIAGNOSIS — Z79.899 LONG TERM CURRENT USE OF ANTIPSYCHOTIC MEDICATION: ICD-10-CM

## 2025-07-28 ENCOUNTER — TELEMEDICINE (OUTPATIENT)
Dept: PSYCHIATRY | Facility: CLINIC | Age: 40
End: 2025-07-28
Payer: MEDICARE

## 2025-07-28 DIAGNOSIS — Z79.899 LONG TERM CURRENT USE OF ANTIPSYCHOTIC MEDICATION: ICD-10-CM

## 2025-07-28 DIAGNOSIS — F43.10 POST TRAUMATIC STRESS DISORDER (PTSD): ICD-10-CM

## 2025-07-28 DIAGNOSIS — F41.1 GENERALIZED ANXIETY DISORDER: ICD-10-CM

## 2025-07-28 DIAGNOSIS — F31.9 BIPOLAR 1 DISORDER (HCC): Primary | ICD-10-CM

## 2025-07-28 PROCEDURE — 99214 OFFICE O/P EST MOD 30 MIN: CPT | Performed by: STUDENT IN AN ORGANIZED HEALTH CARE EDUCATION/TRAINING PROGRAM

## 2025-07-28 RX ORDER — BUSPIRONE HYDROCHLORIDE 10 MG/1
10 TABLET ORAL 2 TIMES DAILY
Qty: 180 TABLET | Refills: 1 | Status: SHIPPED | OUTPATIENT
Start: 2025-07-28

## 2025-07-28 RX ORDER — METFORMIN HYDROCHLORIDE 500 MG/1
500 TABLET, EXTENDED RELEASE ORAL
Qty: 90 TABLET | Refills: 0 | Status: SHIPPED | OUTPATIENT
Start: 2025-07-28

## 2025-07-28 RX ORDER — OLANZAPINE 5 MG/1
5 TABLET, FILM COATED ORAL
Qty: 90 TABLET | Refills: 0 | Status: SHIPPED | OUTPATIENT
Start: 2025-07-28

## (undated) DEVICE — SPONGE STICK WITH PVP-I: Brand: KENDALL

## (undated) DEVICE — TIBURON TRANSVERSE LAPAROTOMY SHEET: Brand: CONVERTORS

## (undated) DEVICE — TIBURON LAPAROTOMY DRAPE: Brand: CONVERTORS

## (undated) DEVICE — CURITY IDOFORM PACKING STRIP: Brand: CURITY

## (undated) DEVICE — ABDOMINAL PAD: Brand: DERMACEA

## (undated) DEVICE — PACK GENERAL LF

## (undated) DEVICE — GAUZE SPONGES,USP TYPE VII GAUZE, 12 PLY: Brand: CURITY

## (undated) DEVICE — PLUMEPEN PRO 10FT

## (undated) DEVICE — INTENDED FOR TISSUE SEPARATION, AND OTHER PROCEDURES THAT REQUIRE A SHARP SURGICAL BLADE TO PUNCTURE OR CUT.: Brand: BARD-PARKER SAFETY BLADES SIZE 15, STERILE

## (undated) DEVICE — GLOVE INDICATOR PI UNDERGLOVE SZ 7.5 BLUE

## (undated) DEVICE — ASTOUND STANDARD SURGICAL GOWN, XL: Brand: CONVERTORS

## (undated) DEVICE — SWABSTCK, BENZOIN TINCTURE, 1/PK, STRL: Brand: APLICARE

## (undated) DEVICE — DRAPE UTILITY

## (undated) DEVICE — GLOVE SRG BIOGEL 7.5

## (undated) DEVICE — WET SKIN PREP TRAY: Brand: MEDLINE INDUSTRIES, INC.

## (undated) DEVICE — BASIC DOUBLE BASIN 2-LF: Brand: MEDLINE INDUSTRIES, INC.

## (undated) DEVICE — DISPOSABLE BRIEF/UNDERWEAR

## (undated) DEVICE — PERI/GYN PACK: Brand: CONVERTORS

## (undated) DEVICE — GLOVE SRG BIOGEL 8